# Patient Record
Sex: MALE | Race: WHITE | Employment: UNEMPLOYED | URBAN - METROPOLITAN AREA
[De-identification: names, ages, dates, MRNs, and addresses within clinical notes are randomized per-mention and may not be internally consistent; named-entity substitution may affect disease eponyms.]

---

## 2019-06-10 ENCOUNTER — HOSPITAL ENCOUNTER (EMERGENCY)
Facility: CLINIC | Age: 59
Discharge: HOME | End: 2019-06-10
Attending: STUDENT IN AN ORGANIZED HEALTH CARE EDUCATION/TRAINING PROGRAM | Admitting: STUDENT IN AN ORGANIZED HEALTH CARE EDUCATION/TRAINING PROGRAM
Payer: COMMERCIAL

## 2019-06-10 ENCOUNTER — APPOINTMENT (OUTPATIENT)
Dept: MRI IMAGING | Facility: CLINIC | Age: 59
End: 2019-06-10
Attending: STUDENT IN AN ORGANIZED HEALTH CARE EDUCATION/TRAINING PROGRAM
Payer: COMMERCIAL

## 2019-06-10 VITALS
RESPIRATION RATE: 16 BRPM | WEIGHT: 196 LBS | TEMPERATURE: 97.4 F | DIASTOLIC BLOOD PRESSURE: 76 MMHG | OXYGEN SATURATION: 96 % | BODY MASS INDEX: 26.58 KG/M2 | HEART RATE: 94 BPM | SYSTOLIC BLOOD PRESSURE: 139 MMHG

## 2019-06-10 DIAGNOSIS — R42 DIZZINESS: ICD-10-CM

## 2019-06-10 DIAGNOSIS — R52 BODY ACHES: ICD-10-CM

## 2019-06-10 DIAGNOSIS — R11.2 NON-INTRACTABLE VOMITING WITH NAUSEA, UNSPECIFIED VOMITING TYPE: ICD-10-CM

## 2019-06-10 LAB
ALBUMIN SERPL-MCNC: 3.7 G/DL (ref 3.4–5)
ALP SERPL-CCNC: 139 U/L (ref 40–150)
ALT SERPL W P-5'-P-CCNC: 45 U/L (ref 0–70)
ANION GAP SERPL CALCULATED.3IONS-SCNC: 5 MMOL/L (ref 3–14)
AST SERPL W P-5'-P-CCNC: 42 U/L (ref 0–45)
BASOPHILS # BLD AUTO: 0.1 10E9/L (ref 0–0.2)
BASOPHILS NFR BLD AUTO: 1.2 %
BILIRUB SERPL-MCNC: 0.3 MG/DL (ref 0.2–1.3)
BUN SERPL-MCNC: 15 MG/DL (ref 7–30)
CALCIUM SERPL-MCNC: 8.8 MG/DL (ref 8.5–10.1)
CHLORIDE SERPL-SCNC: 103 MMOL/L (ref 94–109)
CO2 SERPL-SCNC: 30 MMOL/L (ref 20–32)
CREAT SERPL-MCNC: 0.85 MG/DL (ref 0.66–1.25)
CRP SERPL-MCNC: 3 MG/L (ref 0–8)
DIFFERENTIAL METHOD BLD: ABNORMAL
EOSINOPHIL # BLD AUTO: 0.2 10E9/L (ref 0–0.7)
EOSINOPHIL NFR BLD AUTO: 2.3 %
ERYTHROCYTE [DISTWIDTH] IN BLOOD BY AUTOMATED COUNT: 16 % (ref 10–15)
GFR SERPL CREATININE-BSD FRML MDRD: >90 ML/MIN/{1.73_M2}
GLUCOSE SERPL-MCNC: 193 MG/DL (ref 70–99)
HCT VFR BLD AUTO: 47.5 % (ref 40–53)
HGB BLD-MCNC: 15.8 G/DL (ref 13.3–17.7)
IMM GRANULOCYTES # BLD: 0.1 10E9/L (ref 0–0.4)
IMM GRANULOCYTES NFR BLD: 0.9 %
LYMPHOCYTES # BLD AUTO: 1 10E9/L (ref 0.8–5.3)
LYMPHOCYTES NFR BLD AUTO: 11.8 %
MCH RBC QN AUTO: 32 PG (ref 26.5–33)
MCHC RBC AUTO-ENTMCNC: 33.3 G/DL (ref 31.5–36.5)
MCV RBC AUTO: 96 FL (ref 78–100)
MONOCYTES # BLD AUTO: 0.4 10E9/L (ref 0–1.3)
MONOCYTES NFR BLD AUTO: 4 %
NEUTROPHILS # BLD AUTO: 7.1 10E9/L (ref 1.6–8.3)
NEUTROPHILS NFR BLD AUTO: 79.8 %
NRBC # BLD AUTO: 0 10*3/UL
NRBC BLD AUTO-RTO: 0 /100
PLATELET # BLD AUTO: 221 10E9/L (ref 150–450)
POTASSIUM SERPL-SCNC: 4.2 MMOL/L (ref 3.4–5.3)
PROT SERPL-MCNC: 7.1 G/DL (ref 6.8–8.8)
RBC # BLD AUTO: 4.93 10E12/L (ref 4.4–5.9)
SODIUM SERPL-SCNC: 138 MMOL/L (ref 133–144)
TROPONIN I SERPL-MCNC: <0.015 UG/L (ref 0–0.04)
TSH SERPL DL<=0.005 MIU/L-ACNC: 2.14 MU/L (ref 0.4–4)
WBC # BLD AUTO: 8.9 10E9/L (ref 4–11)

## 2019-06-10 PROCEDURE — 84443 ASSAY THYROID STIM HORMONE: CPT | Performed by: STUDENT IN AN ORGANIZED HEALTH CARE EDUCATION/TRAINING PROGRAM

## 2019-06-10 PROCEDURE — 99284 EMERGENCY DEPT VISIT MOD MDM: CPT | Mod: 25 | Performed by: STUDENT IN AN ORGANIZED HEALTH CARE EDUCATION/TRAINING PROGRAM

## 2019-06-10 PROCEDURE — 93010 ELECTROCARDIOGRAM REPORT: CPT | Mod: Z6 | Performed by: STUDENT IN AN ORGANIZED HEALTH CARE EDUCATION/TRAINING PROGRAM

## 2019-06-10 PROCEDURE — A9585 GADOBUTROL INJECTION: HCPCS | Performed by: STUDENT IN AN ORGANIZED HEALTH CARE EDUCATION/TRAINING PROGRAM

## 2019-06-10 PROCEDURE — 84484 ASSAY OF TROPONIN QUANT: CPT | Performed by: STUDENT IN AN ORGANIZED HEALTH CARE EDUCATION/TRAINING PROGRAM

## 2019-06-10 PROCEDURE — 80053 COMPREHEN METABOLIC PANEL: CPT | Performed by: STUDENT IN AN ORGANIZED HEALTH CARE EDUCATION/TRAINING PROGRAM

## 2019-06-10 PROCEDURE — 70553 MRI BRAIN STEM W/O & W/DYE: CPT

## 2019-06-10 PROCEDURE — 25500064 ZZH RX 255 OP 636: Performed by: STUDENT IN AN ORGANIZED HEALTH CARE EDUCATION/TRAINING PROGRAM

## 2019-06-10 PROCEDURE — 86140 C-REACTIVE PROTEIN: CPT | Performed by: STUDENT IN AN ORGANIZED HEALTH CARE EDUCATION/TRAINING PROGRAM

## 2019-06-10 PROCEDURE — 99284 EMERGENCY DEPT VISIT MOD MDM: CPT | Mod: 25

## 2019-06-10 PROCEDURE — 93005 ELECTROCARDIOGRAM TRACING: CPT

## 2019-06-10 PROCEDURE — 85025 COMPLETE CBC W/AUTO DIFF WBC: CPT | Performed by: STUDENT IN AN ORGANIZED HEALTH CARE EDUCATION/TRAINING PROGRAM

## 2019-06-10 RX ORDER — GADOBUTROL 604.72 MG/ML
10 INJECTION INTRAVENOUS ONCE
Status: COMPLETED | OUTPATIENT
Start: 2019-06-10 | End: 2019-06-10

## 2019-06-10 RX ADMIN — GADOBUTROL 10 ML: 604.72 INJECTION INTRAVENOUS at 21:32

## 2019-06-10 NOTE — ED AVS SNAPSHOT
[facility] Emergency Department  [address] [address]  Phone:  [phone]  Fax:  [phone]                                    Saroj Summers   MRN: [id number]    Department:  [facility] Emergency Department   Date of Visit:  6/10/2019           After Visit Summary Signature Page    I have received my discharge instructions, and my questions have been answered. I have discussed any challenges I see with this plan with the nurse or doctor.    ..........................................................................................................................................  Patient/Patient Representative Signature      ..........................................................................................................................................  Patient Representative Print Name and Relationship to Patient    ..................................................               ................................................  Date                                   Time    ..........................................................................................................................................  Reviewed by Signature/Title    ...................................................              ..............................................  Date                                               Time          22EPIC Rev 08/18

## 2019-06-11 NOTE — ED NOTES
Pt states he feels better. Pt denies all c/o N/V and dizziness at this time. Pt with stable gait in the room.

## 2019-06-11 NOTE — ED NOTES
Describes wandering joint pain and headaches. He has been seen by a Dr Wing And was started on steroids. This has been going on for 5 weeks. He is anxious. He states he is dizzy at times. He has nausea. He is dizzy when he lies down.

## 2019-06-11 NOTE — ED PROVIDER NOTES
History     Chief Complaint   Patient presents with     Joint Pain     multiple complaints of pain and weakness     Headache     HPI  Saroj Summers is a 58 year old male with past medical history which includes anxiety, depression, tobacco use, and PTSD who presents for evaluation of multiple symptoms including headaches, dizziness, and body aches.  Patient explains that for the past 5 weeks his symptoms have been ongoing, spontaneous and without correlation to activities, but not always present.  At times he feels too dizzy to walk, other times he is without dizziness.  He has felt nauseous and suffered from frequent vomiting, but other times has a good appetite.  The headaches do not correlate with dizziness or body aches, generalized and achy but not sudden or thunderclap in onset seems as though his primary concern today was with regards to left-sided body aches beginning at the left shoulder, extending across his chest, followed by left lower extremity, and finally bilateral hips.  He has no active pain at this time.  He denies fever, chills, cough, shortness of breath, abdominal pain, diarrhea, or joint swelling.  No recent skin rashes, tick or insect bites.  He has been prescribed meclizine and Medrol Dosepak without relief.    Allergies:  Allergies   Allergen Reactions     Nka [No Known Allergies]        Problem List:    Patient Active Problem List    Diagnosis Date Noted     PTSD (post-traumatic stress disorder) 06/15/2015     Priority: Medium     Anxiety state 09/13/2012     Priority: Medium     Problem list name updated by automated process. Provider to review       Health Care Home 09/10/2012     Priority: Medium     Status: Pt is not currently in active care coordination.            Tobacco abuse 08/15/2012     Priority: Medium     Mild major depression (H) 08/15/2012     Priority: Medium     Alcoholism (H) 08/15/2012     Priority: Medium     [facility] - 2010 relapse 9/7/12  [facility]  1984 Herb is living at a care home house, has been in CD treatment,         Diverticulosis of sigmoid colon 2009     Priority: Medium     Overview:   per colonoscopy       Depressive disorder 11/15/2007     Priority: Medium     Overview:   Had a lot going on. Sons brain surgeries, wife's bipolar and alcoholism......       Encounter for general adult medical examination without abnormal findings 11/15/2007     Priority: Medium     Recheck lipid and A1C @  Overview:   LAST CPE 1999       Family history of malignant neoplasm of gastrointestinal tract 11/15/2007     Priority: Medium     Overview:   Father had colon cancer at 51yr and  52yr          Past Medical History:    No past medical history on file.    Past Surgical History:    No past surgical history on file.    Family History:    Family History   Problem Relation Age of Onset     Cancer - colorectal Father      Cancer Paternal Grandmother      C.A.D. Paternal Grandfather      Neurologic Disorder Son         brain trauma at birth     Alcohol/Drug Father        Social History:  Marital Status:   [4]  Social History     Tobacco Use     Smoking status: Current Every Day Smoker     Packs/day: 15.00     Years: 0.50     Pack years: 7.50     Smokeless tobacco: Current User   Substance Use Topics     Alcohol use: No     Comment: last drink 4 hrs ago     Drug use: No        Medications:      albuterol (PROVENTIL HFA: VENTOLIN HFA) 108 (90 BASE) MCG/ACT inhaler   albuterol (PROVENTIL HFA: VENTOLIN HFA) 108 (90 BASE) MCG/ACT inhaler   busPIRone (BUSPAR) 5 MG tablet   busPIRone (BUSPAR) 5 MG tablet   FLUoxetine (PROZAC) 20 MG capsule   FLUoxetine (PROZAC) 20 MG capsule   fluticasone (FLONASE) 50 MCG/ACT nasal spray   loratadine (CLARITIN) 10 MG tablet         Review of Systems  Constitutional:  Negative for fever or chills.  Eye:  Negative for double vision or changes from baseline.  Cardiovascular:  Negative for chest  pain.  Respiratory:  Negative for cough or shortness of breath.  Gastrointestinal: Positive for intermittent nausea with vomiting.  Negative for abdominal pain, diarrhea, hematochezia or hematemesis.  Musculoskeletal: Positive for intermittent body and joint pain.  Negative for joint swelling.  Neurological: Positive for intermittent dull achy generalized headaches.  Occasional dizziness described as room spinning sensation.  Negative for weakness or sensory deficits  Skin:  Negative for skin rashes or lesions.    All others reviewed and are negative.      Physical Exam   BP: 141/81  Pulse: 94  Heart Rate: 116  Temp: 97.4  F (36.3  C)  Resp: 18  Weight: 88.9 kg (196 lb)  SpO2: 91 %      Physical Exam  Constitutional:  Well developed, well nourished.  Appears nontoxic and in no acute distress.    HENT:  Normocephalic and atraumatic.  Symmetric in appearance.  Eyes:  Conjunctivae are normal.  Negative for visual field deficit.  EOMI denies diplopia.  PERRLA.  Negative for nystagmus.  Neck:  Neck supple.  Cardiovascular:  No cyanosis.  RRR.  No audible murmurs noted.  No lower extremity edema or asymmetry.   Respiratory:  Effort normal without sign of respiratory distress.  CTAB without diminished regions.    Gastrointestinal:  Soft nondistended abdomen.  Nontender and without guarding.  No rigidity or rebound tenderness.  Negative Zheng's sign.  Negative McBurney's point.  Musculoskeletal:  Moves extremities spontaneously.  Neurological:  Patient is alert, verbally communicating without dysarthria.  Ambulatory without ataxia or unsteady gait.  No facial droop.  CN II-XII grossly intact.    Skin:  Skin is warm and dry.  Psych:  Well-kept appearance.  Patient does not show signs of confusion or intoxication.  Able to carry a conversation with organized speech and thought process.  Mildly anxious but not agitated or threatening and without evidence of delusions.      ED Course        Procedures                 EKG  Interpretation:      Interpreted by: Coleman Hope  Time reviewed: Upon arrival     Symptoms at time of EKG: Asymptomatic   Rhythm: Sinus  Rate: Normal  Axis: Normal    Conduction: None atypical   ST Segments/ T Waves: No pathologic ST-elevations or T-wave abnormalities.  Q Waves: None  Comparison to prior: Similar morphology to previous     Clinical Impression: No sign of ischemia         Critical Care time:  none               Results for orders placed or performed during the hospital encounter of 06/10/19 (from the past 24 hour(s))   CBC with platelets differential   Result Value Ref Range    WBC 8.9 4.0 - 11.0 10e9/L    RBC Count 4.93 4.4 - 5.9 10e12/L    Hemoglobin 15.8 13.3 - 17.7 g/dL    Hematocrit 47.5 40.0 - 53.0 %    MCV 96 78 - 100 fl    MCH 32.0 26.5 - 33.0 pg    MCHC 33.3 31.5 - 36.5 g/dL    RDW 16.0 (H) 10.0 - 15.0 %    Platelet Count 221 150 - 450 10e9/L    Diff Method Automated Method     % Neutrophils 79.8 %    % Lymphocytes 11.8 %    % Monocytes 4.0 %    % Eosinophils 2.3 %    % Basophils 1.2 %    % Immature Granulocytes 0.9 %    Nucleated RBCs 0 0 /100    Absolute Neutrophil 7.1 1.6 - 8.3 10e9/L    Absolute Lymphocytes 1.0 0.8 - 5.3 10e9/L    Absolute Monocytes 0.4 0.0 - 1.3 10e9/L    Absolute Eosinophils 0.2 0.0 - 0.7 10e9/L    Absolute Basophils 0.1 0.0 - 0.2 10e9/L    Abs Immature Granulocytes 0.1 0 - 0.4 10e9/L    Absolute Nucleated RBC 0.0    Comprehensive metabolic panel   Result Value Ref Range    Sodium 138 133 - 144 mmol/L    Potassium 4.2 3.4 - 5.3 mmol/L    Chloride 103 94 - 109 mmol/L    Carbon Dioxide 30 20 - 32 mmol/L    Anion Gap 5 3 - 14 mmol/L    Glucose 193 (H) 70 - 99 mg/dL    Urea Nitrogen 15 7 - 30 mg/dL    Creatinine 0.85 0.66 - 1.25 mg/dL    GFR Estimate >90 >60 mL/min/[1.73_m2]    GFR Estimate If Black >90 >60 mL/min/[1.73_m2]    Calcium 8.8 8.5 - 10.1 mg/dL    Bilirubin Total 0.3 0.2 - 1.3 mg/dL    Albumin 3.7 3.4 - 5.0 g/dL    Protein Total 7.1 6.8 - 8.8 g/dL    Alkaline  Phosphatase 139 40 - 150 U/L    ALT 45 0 - 70 U/L    AST 42 0 - 45 U/L   Troponin I   Result Value Ref Range    Troponin I ES <0.015 0.000 - 0.045 ug/L   TSH with free T4 reflex   Result Value Ref Range    TSH 2.14 0.40 - 4.00 mU/L   CRP Inflammation   Result Value Ref Range    CRP Inflammation 3.0 0.0 - 8.0 mg/L   MR Brain w/o & w Contrast    Narrative    MRI BRAIN WITHOUT AND WITH CONTRAST  6/10/2019 9:33 PM    HISTORY:  Headaches, dizziness.     TECHNIQUE:  Multiplanar, multisequence MRI of the brain without and  with 10mL IV Gadavist.    COMPARISON: Head CT 9/7/2012    FINDINGS:  Mild volume loss is present. The cerebral hemispheres,  brainstem, and cerebellum otherwise demonstrate normal morphology and  signal. No evidence of acute ischemia, hemorrhage, mass, mass effect,  or hydrocephalus. No abnormal enhancement or diffusion restriction is  identified. The visualized calvarium, tympanic cavities, mastoid  cavities, and extracranial soft tissues are unremarkable. 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      Impression    IMPRESSION:  Unremarkable MRI of the head with and without contrast.    JOSHUA ZENG MD       Medications   sodium chloride (PF) 0.9% PF flush 10 mL (10 mLs Intravenous Given 6/10/19 2132)   gadobutrol (GADAVIST) injection 10 mL (10 mLs Intravenous Given 6/10/19 2132)       Assessments & Plan (with Medical Decision Making)   Saroj Summers is a 58 year old male who presents to the department for evaluation of 5 weeks of multiple but intermittent complaints.  Differential diagnosis included intracranial mass, multiple sclerosis, rheumatoid arthritis, Lyme's disease, lupus, or other autoimmune disorder.  He is afebrile and without infectious symptoms, unusually long duration and intermittent nature for infection.  No active symptoms in the department, benign abdominal examination and CBC without ptosis.  CRP is also within reference range.  MRI of  "head/brain was able to be obtained and read as \"unremarkable\" by radiologist without sign of lesions or previous infarct.  At this time it is difficult to discern the etiology of patient's symptoms but I feel he will require close follow-up and possibly specialist consultation.  Recommend he return to primary care provider, discussed emergency department testing and continue with management planning.      Disclaimer:  This note consists of symbols derived from keyboarding, dictation, and/or voice recognition software.  As a result, there may be errors in the script that have gone undetected.  Please consider this when interpreting information found in the chart.        I have reviewed the nursing notes.    I have reviewed the findings, diagnosis, plan and need for follow up with the patient.          Medication List      There are no discharge medications for this visit.         Final diagnoses:   Dizziness   Body aches   Non-intractable vomiting with nausea, unspecified vomiting type       6/10/2019   [facility] EMERGENCY DEPARTMENT     Coleman Hope DO  06/11/19 1732    "

## 2019-09-05 ENCOUNTER — HOSPITAL ENCOUNTER (OUTPATIENT)
Dept: BEHAVIORAL HEALTH | Facility: CLINIC | Age: 59
Discharge: HOME | End: 2019-09-05
Attending: SOCIAL WORKER | Admitting: SOCIAL WORKER
Payer: COMMERCIAL

## 2019-09-05 VITALS — WEIGHT: 204 LBS | BODY MASS INDEX: 26.18 KG/M2 | HEIGHT: 74 IN

## 2019-09-05 PROCEDURE — H0001 ALCOHOL AND/OR DRUG ASSESS: HCPCS

## 2019-09-05 ASSESSMENT — ANXIETY QUESTIONNAIRES
2. NOT BEING ABLE TO STOP OR CONTROL WORRYING: NOT AT ALL
3. WORRYING TOO MUCH ABOUT DIFFERENT THINGS: SEVERAL DAYS
7. FEELING AFRAID AS IF SOMETHING AWFUL MIGHT HAPPEN: NOT AT ALL
5. BEING SO RESTLESS THAT IT IS HARD TO SIT STILL: NOT AT ALL
IF YOU CHECKED OFF ANY PROBLEMS ON THIS QUESTIONNAIRE, HOW DIFFICULT HAVE THESE PROBLEMS MADE IT FOR YOU TO DO YOUR WORK, TAKE CARE OF THINGS AT HOME, OR GET ALONG WITH OTHER PEOPLE: NOT DIFFICULT AT ALL
1. FEELING NERVOUS, ANXIOUS, OR ON EDGE: NOT AT ALL
GAD7 TOTAL SCORE: 1
6. BECOMING EASILY ANNOYED OR IRRITABLE: NOT AT ALL

## 2019-09-05 ASSESSMENT — PATIENT HEALTH QUESTIONNAIRE - PHQ9
SUM OF ALL RESPONSES TO PHQ QUESTIONS 1-9: 1
5. POOR APPETITE OR OVEREATING: NOT AT ALL

## 2019-09-05 ASSESSMENT — MIFFLIN-ST. JEOR: SCORE: 1805.09

## 2019-09-05 ASSESSMENT — PAIN SCALES - GENERAL: PAINLEVEL: NO PAIN (0)
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
## 2019-09-05 NOTE — PROGRESS NOTES
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   Partner encourages use No   Most friends drink or use No   To cope with family problems No   Afraid of withdrawal symptoms/to feel better No   Other (specify)  No     A. What concerns other people about your alcohol or drug use/Has anyone told you that you use too much? What did they say? (DSM)     Pt denied any concern expressed in the last year.     B. What did you think about that/ do you think you have a problem with alcohol or drug use?     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 Pt reported: in the past when I would drink it was not for one day or one drink, I mean I would drink and it was hard liquor. You would be able to smell it if I had drank.    6. What changes are you willing to make? What substance are you willing to stop using? How are you going to do that? Have you tried that before? What interfered with your success with that goal?      Per patient:Been sober since 11/2012-go to meetings once per week, I have several sponsors, 1-3x per week depends. Just my continual going to Hindu and  "spiritual conditions and keeping higher power. \"I rarely ever miss AA meetings and I have a sponsor\".     7. What would be helpful to you in making this change?     Pt reported continue with my sober supports.     Dimension IV Ratings   Readiness for Change - The placing authority must use the criteria in Dimension IV to determine a client s readiness for change.   RISK DESCRIPTIONS - Severity ratin Client is cooperative, motivated, ready to change, admits problems, committed to change, and engaged in treatment as a responsible participant.    REASONS SEVERITY WAS ASSIGNED - (What information did the person provide that supports your assessment of his or her readiness to change? How aware is the person of problems caused by continued use? How willing is she or he to make changes? What does the person feel would be helpful? What has the person been able to do without help?)      Pt reported he has been sober since 2012. Pt reported he plans to continue with his sobriety by continuing with his sober support that he has established.          DIMENSION V - Relapse, Continued Use, and Continued Problem Potential   1A. In what ways have you tried to control, cut-down or quit your use? If you have had periods of sobriety, how did you accomplish that? What was helpful? What happened to prevent you from continuing your sobriety? (DSM)     Pt reported he has been sober since 2012. Pt reported go to meetings once per week, I have several sponsors, 1-3x per week depends. Just my continual going to Confucianism and spiritual conditions and keeping higher power. \"I rarely ever miss AA meetings and I have a sponsor\".     1B. What were the circumstances of your most recent relapse with mood altering chemicals?    Pt reported being sober since 2012.    2. Have you experienced cravings? If yes, ask follow up questions to determine if the person recognizes triggers and if the person has had any success in dealing with them. "     The patient denied having any current cravings to use mood altering chemicals.    3. Have you been treated for alcohol/other drug abuse/dependence? Yes.  3B. Number of times(lifetime) (over what period) 3.  3C. Number of times completed treatment (lifetime) 3.  3D. During the past three years have you participated in outpatient and/or residential?  No    4. Support group participation: Have you/do you attend support group meetings to reduce/stop your alcohol/drug use? How recently? What was your experience? Are you willing to restart? If the person has not participated, is he or she willing?     Per patient:Once per week, last Thursday last meeting and speak with sponsor 1-3x per week just depends.     Per EHR office note on 6/15/15:Patient goes to therapy regularly and AA meetings 3/week.    5. What would assist you in staying sober/straight?     Per patient:Continue with sober supports.     Dimension V Ratings   Relapse/Continued Use/Continued problem potential - The placing authority must use the criteria in Dimension V to determine a client s relapse, continued use, and continued problem potential.   RISK DESCRIPTIONS - Severity ratin Client recognizes relapse issues and prevention strategies, but displays some vulnerability for further substance use or mental health problems.    REASONS SEVERITY WAS ASSIGNED - (What information did the person provide that indicates his or her understanding of relapse issues? What about the person s experience indicates how prone he or she is to relapse? What coping skills does the person have that decrease relapse potential?)      Pt reported he has been sober since 2012. Pt reported he has been staying sober by attending sober support group meetings, sponsorship, and attending Yarsanism. Pt denied cravings. Pt reported past treatment attendance three times and completed all three programs. Pt reported he plans to continue with his sober supports.          DIMENSION VI  - Recovery Environment   1. Are you employed/attending school? Tell me about that.     Pt reported: Not working or attending school, cannot do anything without a car, previous work business owner for 24 years. 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     2A. Describe a typical day; evening for you. Work, school, social, leisure, volunteer, spiritual practices. Include time spent obtaining, using, recovering from drugs or alcohol. (DSM)     Per patient: typically before losing license was working, get up every morning and get son ready for school, and get him off to school, work, get home, and make dinner for him, and appointments or activities for him through school.     Please describe what leisure activities have been associated with your substance abuse:     The patient denied having any leisure activities which had been associated with his substance abuse.    2B. How often do you spend more time than you planned using or use more than you planned? (DSM)     Pt reported he has been sober since 11/2012.     3. How important is using to your social connections? Do many of your family or friends use?     Per patient:Really do not have friends that use, and family members, I think one uses but he lives in [place], not abusive with it, rarely drinks. Not exposed to family members that use alcohol.     4A. Are you currently in a significant relationship?     No    4C. Sexual Orientation:     Heterosexual    5A. Who do you live with?      Pt reported living with son. Per EHR office note on 8/29/17:He is the sole caregiver for his son who has some disabilities and would like to be around for as long as possible.      5B. Tell me about their alcohol/drug use and mental health issues.     Pt denied any use by his son.     5C. Are you concerned for your safety there? No    5D. Are you concerned about the safety of anyone else who lives with you? No    6A. Do you have children who live with you?     No, son 17  "year old with disabilities.     6B. Do you have children who do not live with you?     Yes.  (Ask follow-up questions to determine the person's relationship and responsibility, both legal and care giving; and what arrangements are made for supervision for the children when the person is not available.) 29 year old-pt reported he communicates with her often.     7A. Who supports you in making changes in your alcohol or drug use? What are they willing to do to support you? Who is upset or angry about you making changes in your alcohol or drug use? How big a problem is this for you?      \"AA groups and sponsor\"    7B. This table is provided to record information about the person s relationships and available support It is not necessary to ask each item; only to get a comprehensive picture of their support system.  How often can you count on the following people when you need someone?   Partner / Spouse The patient does not have a current partner or spouse.   Parent(s)/Aunt(s)/Uncle(s)/Grandparents Always supportive   Sibling(s)/Cousin(s) 1 sister talk to her once every 2-3 months   Child(nisa) Always supportive   Other relative(s) The patient doesn't have any current contact with other relatives.   Friend(s)/neighbor(s) Always supportive   Child(nisa) s father(s)/mother(s) The patient doesn't have any current contact with children(s) mother or father.   Support group member(s) Always supportive   Community of [facility]ri[facility] members Always supportive   /counselor/therapist/healer The patient denied having any current involvement with a , counselor, therapist or healer.   Other (specify) No     8A. What is your current living situation?     Pt reported living with son.     8B. What is your long term plan for where you will be living?     Per patient:currently comfortable where I am.Son has a year left in school, would like to get him enrolled in [employer] program they have for special needs. He is high " functioning.     8C. Tell me about your living environment/neighborhood? Ask enough follow up questions to determine safety, criminal activity, availability of alcohol and drugs, supportive or antagonistic to the person making changes.      Per patient:Safe.    9. Criminal justice history: Gather current/recent history and any significant history related to substance use--Arrests? Convictions? Circumstances? Alcohol or drug involvement? Sentences? Still on probation or parole? Expectations of the court? Current court order? Any sex offenses - lifetime? What level? (DSM)    Per patient:no current legal issues, DWIs three, 82', 85', and 2010.     10. What obstacles exist to participating in treatment? (Time off work, childcare, funding, transportation, pending long-term time, living situation)     None reported.     Dimension VI Ratings   Recovery environment - The placing authority must use the criteria in Dimension VI to determine a client s recovery environment.   RISK DESCRIPTIONS - Severity ratin Client is engaged in structured, meaningful activity and has a supportive significant other, family, and living environment.    REASONS SEVERITY WAS ASSIGNED - (What support does the person have for making changes? What structure/stability does the person have in his or her daily life that will increase the likelihood that changes can be sustained? What problems exist in the person s environment that will jeopardize getting/staying clean and sober?)     Pt reported he is not working or attending school currently but takes care of his son who has special needs. Pt reported using is not important to his social connections as he is not around people who use. Pt reported he has communication with his adult daughter who lives independently. Pt reported his support is his AA groups and sponsor. Pt reported past legal issues but denied current legal issues. Pt reported issues with DMV.          Client Choice/Exceptions   Would  you like services specific to language, age, gender, culture, Faith preference, race, ethnicity, sexual orientation or disability?  No    What particular treatment choices and options would you like to have? None    Do you have a preference for a particular treatment program? None    Criteria for Diagnosis     Criteria for Diagnosis  DSM-5 Criteria for Substance Use Disorder  Instructions: Determine whether the client currently meets the criteria for Substance Use Disorder using the diagnostic criteria in the DSM-V pp.481-589. Current means during the most recent 12 months outside a facility that controls access to substances    Category of Substance Severity (ICD-10 Code / DSM 5 Code)     Alcohol Use Disorder The patient does not meet the criteria for an Alcohol use disorder.   Cannabis Use Disorder The patient does not meet the criteria for a Cannabis use disorder.   Hallucinogen Use Disorder The patient does not meet the criteria for a Hallucinogen use disorder.   Inhalant Use Disorder The patient does not meet the criteria for an Inhalant use disorder.   Opioid Use Disorder The patient does not meet the criteria for an Opioid use disorder.   Sedative, Hypnotic, or Anxiolytic Use Disorder The patient does not meet the criteria for a Sedative/Hypnotic use disorder.   Stimulant Related Disorder The patient does not meet the criteria for a Stimulant use disorder.   Tobacco Use Disorder The patient does not meet the criteria for a Tobacco use disorder.   Other (or unknown) Substance Use Disorder The patient does not meet the criteria for a Other (or unknown) Substance use disorder.       Collateral Contact Summary   Number of contacts made: 3    Contact with referring person:  No    If court related records were reviewed, summarize here: No court records had been reviewed at the time of this documentation.    Information from collateral contacts supported/largely agreed with information from the client and  associated risk ratings.      Rule 25 Assessment Summary and Plan   's Recommendation    1. Abstain from using all non-prescribed mood altering chemicals and substances. Take all medication as prescribed and directed by licensed physicians.  2.Continue to follow all recommendations and referrals made by other licensed medical providers such as pcp and specialist medical doctors.  3.Comply with all requirements of the Department of Public Safety/DMV.  4.Continue to attend sober support group meetings and communication with sponsor weekly.      Collateral Contacts     Name:    Electronic Health Records (EHR)   Relationship:    Medical Records   Phone Number:    None Releases:    Yes     Pt medical record was reviewed and utilized for collateral purposes of this assessment and largely agreed with the information from the patient.      Collateral Contacts     Name:    Debra Summers    Relationship:    Mother   Phone Number:    [phone]   Releases:    Yes     Called collateral contact on 9/5/19 who reported:Right I was here when they brought him home, the officer brought him here, and he said he did not smell any alcohol, I said he (patient) was under doctor care and they are not sure if it is Lyme Disease or Diabetes. He had been seeing the doctor and even that day saw the doctor and had trouble walking and could not even walk around the house without catching himself or stopping self and grabbing something. I told the officer that and the officer said okay. Right he has been sober. Collateral information from collateral contact largely agreed with the information from the patient.    Collateral Contacts     Name:    Debra Khalil    Relationship:    Sister   Phone Number:    [phone]    Releases:    Yes     Left VM for collateral contact and information on 9/5/19. The collateral contact called back and reported: No concern. Sounds accurate, was not there when police were there, but heard about it,  the way you described it the same story I was told. We were surprised that he was getting his license taken away as he did not do anything. Collateral information from collateral contact largely agreed with the information from the patient.   ollateral Contacts      A problematic pattern of alcohol/drug use leading to clinically significant impairment or distress, as manifested by at least two of the following, occurring within a 12-month period:    None      Specify if: In early remission:  After full criteria for alcohol/drug use disorder were previously met, none of the criteria for alcohol/drug use disorder have been met for at least 3 months but for less than 12 months (with the exception that Criterion A4,  Craving or a strong desire or urge to use alcohol/drug  may be met).     In sustained remission:   After full criteria for alcohol use disorder were previously met, non of the criteria for alcohol/drug use disorder have been met at any time during a period of 12 months or longer (with the exception that Criterion A4,  Craving or strong desire or urge to use alcohol/drug  may be met).   Specify if:   This additional specifier is used if the individual is in an environment where access to alcohol is restricted.    Mild: Presence of 2-3 symptoms  Moderate: Presence of 4-5 symptoms  Severe: Presence of 6 or more symptoms

## 2019-09-05 NOTE — PROGRESS NOTES
[facility]  [address]  [place] [address]        ADULT CD ASSESSMENT ADDENDUM      Patient Name: Saroj Summers  Cell Phone:   Home: [phone] (home)    Mobile:   Telephone Information:   Mobile [phone]       Email:  [email or web address]  Emergency Contact: Debra Summers   Tel: [phone]    The patient reported being:      With which race do you identify? White    Initial Screening Questions     1. Are you currently having severe withdrawal symptoms that are putting yourself or others in danger?  No    2. Are you currently having severe medical problems that require immediate attention?  No    3. Are you currently having severe emotional or behavioral problems that are putting yourself or others at risk of harm?  No    4. Do you have sufficient reading skills that will enable you to understand written materials, including the program rules and client rights materials?  Yes     Family History and other additional information     Who raised you? (parents, grandparents, adoptive parents, step-parents, etc.)    Both Parents    Please tell me what it was like growing up in your family. (please include any history of substance abuse, mental health issues, emotional/physical/sexual abuse, forms of discipline, and support)     Per patient:raised by parents, mother is living, father is , two sisters are living, four brothers living, father alcoholism. Chaotic, mother and father were together but  after when kids were all adults. My father  shortly after. Felt most supported by both parents. No abuse within the family. No MH conditions in family. Grew up in [place], [place].     Do you have any children or Stepchildren? Yes, explain: 17 year old son, 29 year old daughter    Are you being investigated by Child Protection Services? No    Do you have a child protection worker, probation office or ?  No    How would you  "describe your current finances?  Just making it    If you are having problems, (unpaid bills, bankruptcy, IRS problems) please explain:  No    If working or a student are you able to function appropriately in that setting? Yes     Describe your preferred learning style:  by reading    What are your some of your personal strengths?  \"I rarely ever miss AA Meetings and have a sponsor\"    Do you currently participate in community arianna activities, such as attending Islam, temple, Confucianism or Taoism services?  Yes, explain: [employer] weekly.     How does your spirituality impact your recovery?  My higher power Hinduism God, attendance to [employer] every week strengthens my sobriety. Gives me a feeling of good purpose. Stay involved with father of Lutheran, son was in young Hinduism group.     Do you currently self-administer your medications?  The patient is not currently taking any prescription or over the counter medications.    Have you ever had to lie to people important to you about how much you fermin?   No   Have you ever felt the need to bet more and more money?   No   Have you ever attempted treatment for a gambling problem?   No   Have you ever touched or fondled someone else inappropriately or forced them to have sex with you against their will?   No   Are you or have you ever been a registered sex offender?   No   Is there any history of sexual abuse in your family? No   Have you ever felt obsessed by your sexual behavior, such as having sex with many partners, masturbating often, using pornography often?   No     Have you ever received therapy or stayed in the hospital for mental health problems?   Yes, explain: Pt reported past therapy.      Have you ever hurt yourself, such as cutting, burning or hitting yourself?   No     Have you ever purged, binged or restricted yourself as a way to control your weight?   No     Are you on a special diet?   No     Do you have any concerns regarding your nutritional " status?   No     Have you had any appetite changes in the last 3 months?   No   Have you had weight loss or weight gain of more than 10 lbs in the last 3 months?   If patient gained or lost more than 10 lbs, then refer to program RN / attending Physician for assessment.   No   Was the patient informed of BMI?    Above,  General nutrition education   No   Have you engaged in any risk-taking behavior that would put you at risk for exposure to blood-borne or sexually transmitted diseases?   No   Do you have any dental problems?   No   Have you ever lived through any trauma or stressful life events?   No   In the past month, have you had any of the following symptoms related to the trauma listed above? (dreams, intense memories, flashbacks, physical reactions, etc.)   No   Have you ever believed people were spying on you, or that someone was plotting against you or trying to hurt you?   No   Have you ever believed someone was reading your mind or could hear your thoughts or that you could actually read someone's mind or hear what another person was thinking?   No   Have you ever believed that someone of some force outside of yourself was putting thoughts into your mind or made you act in a way that was not your usual self?  Have you ever though you were possessed?   No   Have you ever believed you were being sent special messages through the TV, radio or newspaper?   No   Have you ever heard things other people couldn't hear, such as voices or other noises?   No   Have you ever had visions when you were awake?  Or have you ever seen things other people couldn't see?   No   Do you have a valid 's license?    No, explain: revoked currently     PHQ-9, LUCY-7 and Suicide Risk Assessment   PHQ-9 on 9/5/2019 LUCY-7 on 9/5/2019   The patient's PHQ-9 score was 1 out of 27, indicating minimal depression.   The patient's LUCY-7 score was 1 out of 21, indicating minimal anxiety.       [place]-Suicide Severity Rating Scale    Suicide Ideation   1.) Have you ever wished you were dead or that you could go to sleep and not wake up?     Lifetime:  No   Past Month:  No     2.) Have you actually had any thoughts of killing yourself?   Lifetime:  No   Past Month:  No     3.) Have you been thinking about how you might do this?     Lifetime:  No   Past Month:  No     4.) Have you had these thoughts and had some intention of acting on them?     Lifetime:  No   Past Month:  No     5.) Have you started to work out the details of how to kill yourself?   Lifetime:  No   Past Month:  No     6.) Do you intend to carry out this plan?      Lifetime:  No   Past Month:  No     Intensity of Ideation   Intensity of ideation (1 being least severe, 5 being most severe):     Lifetime:  The patient denied ever having any suicidal thoughts in life.   Past Month:  The patient denied ever having any suicidal thoughts in life.     How often do you have these thoughts?  The patient denied ever having any suicidal thoughts in life.     When you have the thoughts how long do they last?  The patient denied ever having any suicidal thoughts in life.     Can you stop thinking about killing yourself or wanting to die if you want to?  The patient denied ever having any suicidal thoughts in life.     Are there things - anyone or anything (i.e. family, Judaism, pain of death) that stopped you from wanting to die or acting on thoughts of suicide?  Does not apply     What sort of reasons did you have for thinking about wanting to die or killing yourself (ie end pain, stop how you were feeling, get attention or reaction, revenge)?  Does not apply     Suicidal Behavior   (Suicide Attempt) - Have you made a suicide attempt?     Lifetime:  The patient had never made a suicide attempt.   Past Month:  The patient had never made a suicide attempt.     Have you engaged in self-harm (non-suicidal self-injury)?  The patient denied having any history of engaging in self-harm  (non-suicidal self-injury).     (Interrupted Attempt) - Has there been a time when you started to do something to end your life but someone or something stopped you before you actually did anything?  No     (Aborted or Self-Interrupted Attempt) - Has there been a time when you started to do something to try to end your life but you stopped yourself before you actually did anything?  No     (Preparatory Acts of Behavior) - Have you taken any steps towards making suicide attempt or preparing to kill yourself (such as collecting pills, getting a gun, giving valuables away or writing a suicide note)?  No     Actual Lethality/Medical Damage:  The patient denied ever making a suicidal attempt.       2008  The Research [facility] [employer]  Used with permission by Brittni Crystal, PhD.       Guide to C-SSRS Risk Ratings   NO IDEATION:  with no active thoughts IDEATION: with a wish to die. IDEATION: with active thoughts. Risk Ratings   If Yes No No 0 - Very Low Risk   If NA Yes No 1 - Low Risk   If NA Yes Yes 2 - Low/moderate risk   IDEATION: associated thoughts of methods without intent or plan INTENT: Intent to follow through on suicide PLAN: Plan to follow through on suicide Risk Ratings cont...   If Yes No No 3 - Moderate Risk   If Yes Yes No 4 - High Risk   If Yes Yes Yes 5 - High Risk   The patient's ADDITIONAL RISK FACTORS and lack of PROTECTIVE FACTORS may increase their overall suicide risk ratings.     Additional Risk Factors:    Significant history of physical illness or chronic medical problems   Protective Factors:    Having people in his/her life that would prevent the patient from considering a suicide attempt (i.e. young children, spouse, parents, etc.)     A positive relationship with his/her clinical medical and/or mental health providers     Having a good community support network     Risk Status   Past month:0. - Very Low Risk:  Evaluation Counselors:  Document in Epic / SBAR to counselor  "\"Very Low Risk\".      Treatment Counselors:  Reassess upon admission as applicable, assess weekly in progress notes under Dimension 3 and summarize in Discharge / Treatment summary under Dimension 3.    Past 24 hours:0. - Very Low Risk:  Evaluation Counselors:  Document in Epic / SBAR to counselor \"Very Low Risk\".      Treatment Counselors:  Reassess upon admission as applicable, assess weekly in progress notes under Dimension 3 and summarize in Discharge / Treatment summary under Dimension 3.   Additional information to support suicide risk rating: There was no additional information to provide at this time.     Mental Health Status   Physical Appearance/Attire: Appears stated age and Neat   Hygiene: well groomed   Eye Contact: at examiner   Speech Rate:  regular   Speech Volume: regular   Speech Quality: fluid   Cognitive/Perceptual:  reality based   Cognition: memory intact    Judgment: able to concentrate   Insight: able to concentrate   Orientation:  time, place, person and situation   Thought: concrete   Hallucinations:  none   General Behavioral Tone: cooperative   Psychomotor Activity: no problem noted   Gait:  no problem   Mood: appropriate   Affect: congruence/appropriate   Counselor Notes: NA     Criteria for Diagnosis: DSM-5 Criteria for Substance Use Disorders      The patient does not currently identify with a DSM-5 substance use disorder.    Level of Care   I.) Intoxication and Withdrawal: 0   II.) Biomedical:  1   III.) Emotional and Behavioral:  1   IV.) Readiness to Change:  0   V.) Relapse Potential: 1   VI.) Recovery Environmental: 1     Initial Problem List     Pt has current DMV issues.     Patient/Client is willing to follow treatment recommendations.  Yes    Counselor: Sola Moses [facility]      Vulnerable Adult Checklist for OUTPATIENTS     1.  Do you have a physical, emotional or mental infirmity or dysfunction?       No    2.  Does this issue impair your ability to provide for your own " care without help, including providing yourself with food, shelter, clothing, healthcare or supervision?       No    3.  Because of this issue, I need assistance to protect myself from maltreatment by others.      No    Based on the above information:    This person is not a functional Vulnerable Adult according to [place] Statute 626.5572 subdivision 21.

## 2019-09-06 ASSESSMENT — ANXIETY QUESTIONNAIRES: GAD7 TOTAL SCORE: 1

## 2020-07-13 ENCOUNTER — APPOINTMENT (OUTPATIENT)
Dept: CT IMAGING | Facility: CLINIC | Age: 60
End: 2020-07-13
Attending: EMERGENCY MEDICINE
Payer: COMMERCIAL

## 2020-07-13 ENCOUNTER — HOSPITAL ENCOUNTER (INPATIENT)
Facility: CLINIC | Age: 60
LOS: 1 days | Discharge: HOME | End: 2020-07-14
Attending: EMERGENCY MEDICINE | Admitting: SURGERY
Payer: COMMERCIAL

## 2020-07-13 ENCOUNTER — ANESTHESIA (OUTPATIENT)
Dept: SURGERY | Facility: CLINIC | Age: 60
End: 2020-07-13
Payer: COMMERCIAL

## 2020-07-13 ENCOUNTER — ANESTHESIA EVENT (OUTPATIENT)
Dept: SURGERY | Facility: CLINIC | Age: 60
End: 2020-07-13
Payer: COMMERCIAL

## 2020-07-13 DIAGNOSIS — E27.8 ADRENAL MASS, RIGHT (H): ICD-10-CM

## 2020-07-13 DIAGNOSIS — K35.33 APPENDICITIS WITH ABSCESS: Primary | ICD-10-CM

## 2020-07-13 DIAGNOSIS — K35.32 ACUTE APPENDICITIS WITH PERFORATION AND LOCALIZED PERITONITIS, WITHOUT ABSCESS OR GANGRENE: ICD-10-CM

## 2020-07-13 LAB
ALBUMIN SERPL-MCNC: 3.5 G/DL (ref 3.4–5)
ALP SERPL-CCNC: 95 U/L (ref 40–150)
ALT SERPL W P-5'-P-CCNC: 29 U/L (ref 0–70)
ANION GAP SERPL CALCULATED.3IONS-SCNC: 7 MMOL/L (ref 3–14)
AST SERPL W P-5'-P-CCNC: 21 U/L (ref 0–45)
BASOPHILS # BLD AUTO: 0.1 10E9/L (ref 0–0.2)
BASOPHILS NFR BLD AUTO: 0.6 %
BILIRUB SERPL-MCNC: 0.5 MG/DL (ref 0.2–1.3)
BUN SERPL-MCNC: 14 MG/DL (ref 7–30)
CALCIUM SERPL-MCNC: 9 MG/DL (ref 8.5–10.1)
CHLORIDE SERPL-SCNC: 106 MMOL/L (ref 94–109)
CO2 SERPL-SCNC: 25 MMOL/L (ref 20–32)
CREAT SERPL-MCNC: 0.86 MG/DL (ref 0.66–1.25)
DIFFERENTIAL METHOD BLD: ABNORMAL
EOSINOPHIL # BLD AUTO: 0.3 10E9/L (ref 0–0.7)
EOSINOPHIL NFR BLD AUTO: 2.3 %
ERYTHROCYTE [DISTWIDTH] IN BLOOD BY AUTOMATED COUNT: 13.6 % (ref 10–15)
GFR SERPL CREATININE-BSD FRML MDRD: >90 ML/MIN/{1.73_M2}
GLUCOSE SERPL-MCNC: 89 MG/DL (ref 70–99)
HCT VFR BLD AUTO: 44.4 % (ref 40–53)
HGB BLD-MCNC: 15 G/DL (ref 13.3–17.7)
IMM GRANULOCYTES # BLD: 0.2 10E9/L (ref 0–0.4)
IMM GRANULOCYTES NFR BLD: 1.9 %
LYMPHOCYTES # BLD AUTO: 1.7 10E9/L (ref 0.8–5.3)
LYMPHOCYTES NFR BLD AUTO: 14.2 %
MCH RBC QN AUTO: 31 PG (ref 26.5–33)
MCHC RBC AUTO-ENTMCNC: 33.8 G/DL (ref 31.5–36.5)
MCV RBC AUTO: 92 FL (ref 78–100)
MONOCYTES # BLD AUTO: 1 10E9/L (ref 0–1.3)
MONOCYTES NFR BLD AUTO: 8.6 %
NEUTROPHILS # BLD AUTO: 8.5 10E9/L (ref 1.6–8.3)
NEUTROPHILS NFR BLD AUTO: 72.4 %
NRBC # BLD AUTO: 0 10*3/UL
NRBC BLD AUTO-RTO: 0 /100
PLATELET # BLD AUTO: 263 10E9/L (ref 150–450)
POTASSIUM SERPL-SCNC: 3.8 MMOL/L (ref 3.4–5.3)
PROT SERPL-MCNC: 7.9 G/DL (ref 6.8–8.8)
RBC # BLD AUTO: 4.84 10E12/L (ref 4.4–5.9)
SODIUM SERPL-SCNC: 138 MMOL/L (ref 133–144)
WBC # BLD AUTO: 11.8 10E9/L (ref 4–11)

## 2020-07-13 PROCEDURE — 37000008 ZZH ANESTHESIA TECHNICAL FEE, 1ST 30 MIN: Performed by: SURGERY

## 2020-07-13 PROCEDURE — 80053 COMPREHEN METABOLIC PANEL: CPT | Performed by: EMERGENCY MEDICINE

## 2020-07-13 PROCEDURE — 0DTJ4ZZ RESECTION OF APPENDIX, PERCUTANEOUS ENDOSCOPIC APPROACH: ICD-10-PCS | Performed by: SURGERY

## 2020-07-13 PROCEDURE — 25800025 ZZH RX 258: Performed by: SURGERY

## 2020-07-13 PROCEDURE — 25800030 ZZH RX IP 258 OP 636: Performed by: EMERGENCY MEDICINE

## 2020-07-13 PROCEDURE — 88304 TISSUE EXAM BY PATHOLOGIST: CPT | Performed by: SURGERY

## 2020-07-13 PROCEDURE — 99221 1ST HOSP IP/OBS SF/LOW 40: CPT | Mod: 57 | Performed by: SURGERY

## 2020-07-13 PROCEDURE — 25000128 H RX IP 250 OP 636: Performed by: EMERGENCY MEDICINE

## 2020-07-13 PROCEDURE — 25000128 H RX IP 250 OP 636: Performed by: NURSE ANESTHETIST, CERTIFIED REGISTERED

## 2020-07-13 PROCEDURE — 37000009 ZZH ANESTHESIA TECHNICAL FEE, EACH ADDTL 15 MIN: Performed by: SURGERY

## 2020-07-13 PROCEDURE — 25000125 ZZHC RX 250: Performed by: NURSE ANESTHETIST, CERTIFIED REGISTERED

## 2020-07-13 PROCEDURE — 44970 LAPAROSCOPY APPENDECTOMY: CPT | Performed by: SURGERY

## 2020-07-13 PROCEDURE — 85025 COMPLETE CBC W/AUTO DIFF WBC: CPT | Performed by: EMERGENCY MEDICINE

## 2020-07-13 PROCEDURE — 25000566 ZZH SEVOFLURANE, EA 15 MIN: Performed by: SURGERY

## 2020-07-13 PROCEDURE — 12000000 ZZH R&B MED SURG/OB

## 2020-07-13 PROCEDURE — 36000056 ZZH SURGERY LEVEL 3 1ST 30 MIN: Performed by: SURGERY

## 2020-07-13 PROCEDURE — 71000014 ZZH RECOVERY PHASE 1 LEVEL 2 FIRST HR: Performed by: SURGERY

## 2020-07-13 PROCEDURE — 0DNW4ZZ RELEASE PERITONEUM, PERCUTANEOUS ENDOSCOPIC APPROACH: ICD-10-PCS | Performed by: SURGERY

## 2020-07-13 PROCEDURE — 25000125 ZZHC RX 250: Performed by: SURGERY

## 2020-07-13 PROCEDURE — 74177 CT ABD & PELVIS W/CONTRAST: CPT

## 2020-07-13 PROCEDURE — 25800030 ZZH RX IP 258 OP 636: Performed by: NURSE ANESTHETIST, CERTIFIED REGISTERED

## 2020-07-13 PROCEDURE — 88304 TISSUE EXAM BY PATHOLOGIST: CPT | Mod: 26 | Performed by: SURGERY

## 2020-07-13 PROCEDURE — 25000125 ZZHC RX 250: Performed by: EMERGENCY MEDICINE

## 2020-07-13 PROCEDURE — 36000058 ZZH SURGERY LEVEL 3 EA 15 ADDTL MIN: Performed by: SURGERY

## 2020-07-13 PROCEDURE — 27110028 ZZH OR GENERAL SUPPLY NON-STERILE: Performed by: SURGERY

## 2020-07-13 PROCEDURE — 27210794 ZZH OR GENERAL SUPPLY STERILE: Performed by: SURGERY

## 2020-07-13 RX ORDER — IOPAMIDOL 755 MG/ML
100 INJECTION, SOLUTION INTRAVASCULAR ONCE
Status: COMPLETED | OUTPATIENT
Start: 2020-07-13 | End: 2020-07-13

## 2020-07-13 RX ORDER — PHENYLEPHRINE HYDROCHLORIDE 10 MG/ML
INJECTION INTRAVENOUS PRN
Status: DISCONTINUED | OUTPATIENT
Start: 2020-07-13 | End: 2020-07-14

## 2020-07-13 RX ORDER — ONDANSETRON 2 MG/ML
INJECTION INTRAMUSCULAR; INTRAVENOUS PRN
Status: DISCONTINUED | OUTPATIENT
Start: 2020-07-13 | End: 2020-07-14

## 2020-07-13 RX ORDER — BUPIVACAINE HYDROCHLORIDE 5 MG/ML
INJECTION, SOLUTION PERINEURAL PRN
Status: DISCONTINUED | OUTPATIENT
Start: 2020-07-13 | End: 2020-07-14 | Stop reason: HOSPADM

## 2020-07-13 RX ORDER — EPHEDRINE SULFATE 50 MG/ML
INJECTION, SOLUTION INTRAVENOUS PRN
Status: DISCONTINUED | OUTPATIENT
Start: 2020-07-13 | End: 2020-07-14

## 2020-07-13 RX ORDER — SODIUM CHLORIDE, SODIUM LACTATE, POTASSIUM CHLORIDE, CALCIUM CHLORIDE 600; 310; 30; 20 MG/100ML; MG/100ML; MG/100ML; MG/100ML
INJECTION, SOLUTION INTRAVENOUS CONTINUOUS PRN
Status: DISCONTINUED | OUTPATIENT
Start: 2020-07-13 | End: 2020-07-14

## 2020-07-13 RX ORDER — LIDOCAINE HYDROCHLORIDE 10 MG/ML
INJECTION, SOLUTION INFILTRATION; PERINEURAL PRN
Status: DISCONTINUED | OUTPATIENT
Start: 2020-07-13 | End: 2020-07-14

## 2020-07-13 RX ORDER — LIDOCAINE HYDROCHLORIDE AND EPINEPHRINE 10; 10 MG/ML; UG/ML
INJECTION, SOLUTION INFILTRATION; PERINEURAL PRN
Status: DISCONTINUED | OUTPATIENT
Start: 2020-07-13 | End: 2020-07-14 | Stop reason: HOSPADM

## 2020-07-13 RX ORDER — PROPOFOL 10 MG/ML
INJECTION, EMULSION INTRAVENOUS PRN
Status: DISCONTINUED | OUTPATIENT
Start: 2020-07-13 | End: 2020-07-14

## 2020-07-13 RX ORDER — FENTANYL CITRATE 50 UG/ML
INJECTION, SOLUTION INTRAMUSCULAR; INTRAVENOUS PRN
Status: DISCONTINUED | OUTPATIENT
Start: 2020-07-13 | End: 2020-07-14

## 2020-07-13 RX ORDER — SODIUM CHLORIDE 9 MG/ML
1000 INJECTION, SOLUTION INTRAVENOUS CONTINUOUS
Status: DISCONTINUED | OUTPATIENT
Start: 2020-07-13 | End: 2020-07-14

## 2020-07-13 RX ORDER — DEXAMETHASONE SODIUM PHOSPHATE 4 MG/ML
INJECTION, SOLUTION INTRA-ARTICULAR; INTRALESIONAL; INTRAMUSCULAR; INTRAVENOUS; SOFT TISSUE PRN
Status: DISCONTINUED | OUTPATIENT
Start: 2020-07-13 | End: 2020-07-14

## 2020-07-13 RX ORDER — GLYCOPYRROLATE 0.2 MG/ML
INJECTION, SOLUTION INTRAMUSCULAR; INTRAVENOUS PRN
Status: DISCONTINUED | OUTPATIENT
Start: 2020-07-13 | End: 2020-07-14

## 2020-07-13 RX ADMIN — SODIUM CHLORIDE 1000 ML: 9 INJECTION, SOLUTION INTRAVENOUS at 18:43

## 2020-07-13 RX ADMIN — TAZOBACTAM SODIUM AND PIPERACILLIN SODIUM 4.5 G: 500; 4 INJECTION, SOLUTION INTRAVENOUS at 19:44

## 2020-07-13 RX ADMIN — GLYCOPYRROLATE 0.3 MG: 0.2 INJECTION, SOLUTION INTRAMUSCULAR; INTRAVENOUS at 21:49

## 2020-07-13 RX ADMIN — IOPAMIDOL 100 ML: 755 INJECTION, SOLUTION INTRAVENOUS at 18:31

## 2020-07-13 RX ADMIN — LIDOCAINE HYDROCHLORIDE 50 MG: 10 INJECTION, SOLUTION INFILTRATION; PERINEURAL at 21:24

## 2020-07-13 RX ADMIN — MIDAZOLAM 2 MG: 1 INJECTION INTRAMUSCULAR; INTRAVENOUS at 21:22

## 2020-07-13 RX ADMIN — PHENYLEPHRINE HYDROCHLORIDE 200 MCG: 10 INJECTION INTRAVENOUS at 21:49

## 2020-07-13 RX ADMIN — PROPOFOL 200 MG: 10 INJECTION, EMULSION INTRAVENOUS at 21:24

## 2020-07-13 RX ADMIN — FENTANYL CITRATE 150 MCG: 50 INJECTION, SOLUTION INTRAMUSCULAR; INTRAVENOUS at 21:24

## 2020-07-13 RX ADMIN — DEXAMETHASONE SODIUM PHOSPHATE 4 MG: 4 INJECTION, SOLUTION INTRA-ARTICULAR; INTRALESIONAL; INTRAMUSCULAR; INTRAVENOUS; SOFT TISSUE at 23:31

## 2020-07-13 RX ADMIN — HYDROMORPHONE HYDROCHLORIDE 1 MG: 1 INJECTION, SOLUTION INTRAMUSCULAR; INTRAVENOUS; SUBCUTANEOUS at 21:36

## 2020-07-13 RX ADMIN — SUGAMMADEX 200 MG: 100 INJECTION, SOLUTION INTRAVENOUS at 23:31

## 2020-07-13 RX ADMIN — ROCURONIUM BROMIDE 30 MG: 10 INJECTION INTRAVENOUS at 21:24

## 2020-07-13 RX ADMIN — EPHEDRINE SULFATE 10 MG: 50 INJECTION, SOLUTION INTRAVENOUS at 21:39

## 2020-07-13 RX ADMIN — SODIUM CHLORIDE, POTASSIUM CHLORIDE, SODIUM LACTATE AND CALCIUM CHLORIDE: 600; 310; 30; 20 INJECTION, SOLUTION INTRAVENOUS at 21:22

## 2020-07-13 RX ADMIN — ONDANSETRON 4 MG: 2 INJECTION INTRAMUSCULAR; INTRAVENOUS at 23:31

## 2020-07-13 RX ADMIN — FENTANYL CITRATE 100 MCG: 50 INJECTION, SOLUTION INTRAMUSCULAR; INTRAVENOUS at 21:22

## 2020-07-13 RX ADMIN — SODIUM CHLORIDE 67 ML: 9 INJECTION, SOLUTION INTRAVENOUS at 18:31

## 2020-07-13 ASSESSMENT — LIFESTYLE VARIABLES: TOBACCO_USE: 1

## 2020-07-13 NOTE — ED PROVIDER NOTES
"  History     Chief Complaint   Patient presents with     Abdominal Pain     RLQ  abd pain      HPI  Saroj Summers is a 59 year old male who presents to the emergency department for evaluation of 3 days of lower abdominal pain which is greatest in the left lower quadrant.  Insidious onset of pain which is gradually worsened and is now moderate to severe, constant, poorly localized and associated with a fever of 101 yesterday. No relief with antacids, Pepto-Bismol, and magnesium citrate.  After taking magnesium citrate yesterday he developed \"dark tarry stool\", prior to this BM the preceding day was normal.  No other evidence of GI bleeding.  No back or flank pain, no UTI signs or symptoms or hematuria.  No prior abdominal surgeries.    Allergies:  Allergies   Allergen Reactions     Nka [No Known Allergies]        Problem List:    Patient Active Problem List    Diagnosis Date Noted     PTSD (post-traumatic stress disorder) 06/15/2015     Priority: Medium     Anxiety state 09/13/2012     Priority: Medium     Problem list name updated by automated process. Provider to review       Health Care Home 09/10/2012     Priority: Medium     Status: Pt is not currently in active care coordination.            Tobacco abuse 08/15/2012     Priority: Medium     Mild major depression (H) 08/15/2012     Priority: Medium     Alcoholism (H) 08/15/2012     Priority: Medium     [facility] relapse 9/7/12  [facility]'s [facility]  December 17, 2012 Herb is living at a assisted house, has been in CD treatment,         Diverticulosis of sigmoid colon 11/12/2009     Priority: Medium     Overview:   per colonoscopy       Depressive disorder 11/15/2007     Priority: Medium     Overview:   Had a lot going on. Sons brain surgeries, wife's bipolar and alcoholism......       Encounter for general adult medical examination without abnormal findings 11/15/2007     Priority: Medium     Recheck lipid and A1C @2/18  Overview:   LAST CPE " 1999       Family history of malignant neoplasm of gastrointestinal tract 11/15/2007     Priority: Medium     Overview:   Father had colon cancer at 51yr and  52yr          Past Medical History:    No past medical history on file.    Past Surgical History:    No past surgical history on file.    Family History:    Family History   Problem Relation Age of Onset     Cancer - colorectal Father      Alcohol/Drug Father      Cancer Paternal Grandmother      C.A.D. Paternal Grandfather      Neurologic Disorder Son         brain trauma at birth       Social History:  Marital Status:   [4]  Social History     Tobacco Use     Smoking status: Former Smoker     Packs/day: 15.00     Years: 0.50     Pack years: 7.50     Last attempt to quit:      Years since quittin.5     Smokeless tobacco: Current User   Substance Use Topics     Alcohol use: Not Currently     Comment: sober 2012     Drug use: No        Medications:    albuterol (PROVENTIL HFA: VENTOLIN HFA) 108 (90 BASE) MCG/ACT inhaler  albuterol (PROVENTIL HFA: VENTOLIN HFA) 108 (90 BASE) MCG/ACT inhaler  busPIRone (BUSPAR) 5 MG tablet  busPIRone (BUSPAR) 5 MG tablet  FLUoxetine (PROZAC) 20 MG capsule  FLUoxetine (PROZAC) 20 MG capsule  fluticasone (FLONASE) 50 MCG/ACT nasal spray  loratadine (CLARITIN) 10 MG tablet        Review of Systems  As mentioned above in the history present illness.  All other systems were reviewed and are negative.      Physical Exam   BP: 125/82  Pulse: 81  Temp: 98.5  F (36.9  C)  Resp: 18  SpO2: 96 %      Physical Exam  Vitals signs and nursing note reviewed.   Constitutional:       General: He is not in acute distress.     Appearance: Normal appearance. He is well-developed. He is not ill-appearing or diaphoretic.   HENT:      Head: Normocephalic and atraumatic.      Right Ear: External ear normal.      Left Ear: External ear normal.      Nose: Nose normal.      Mouth/Throat:      Mouth: Mucous membranes are moist.    Eyes:      General: No scleral icterus.     Extraocular Movements: Extraocular movements intact.      Conjunctiva/sclera: Conjunctivae normal.   Neck:      Musculoskeletal: Normal range of motion and neck supple.      Trachea: No tracheal deviation.   Cardiovascular:      Rate and Rhythm: Normal rate and regular rhythm.      Heart sounds: Normal heart sounds. No murmur. No friction rub. No gallop.    Pulmonary:      Effort: Pulmonary effort is normal. No respiratory distress.      Breath sounds: Normal breath sounds. No wheezing, rhonchi or rales.   Abdominal:      General: Bowel sounds are decreased. There is no distension.      Palpations: Abdomen is soft.      Tenderness: There is abdominal tenderness. There is guarding and rebound. There is no right CVA tenderness or left CVA tenderness. Positive signs include McBurney's sign.      Hernia: No hernia is present.   Musculoskeletal: Normal range of motion.         General: No tenderness.      Right lower leg: No edema.      Left lower leg: No edema.   Skin:     General: Skin is warm and dry.      Coloration: Skin is not pale.      Findings: No erythema or rash.   Neurological:      General: No focal deficit present.      Mental Status: He is alert and oriented to person, place, and time.      Coordination: Coordination normal.   Psychiatric:         Mood and Affect: Mood normal.         Behavior: Behavior normal.         ED Course        Procedures                 Results for orders placed or performed during the hospital encounter of 07/13/20 (from the past 24 hour(s))   CBC with platelets differential   Result Value Ref Range    WBC 11.8 (H) 4.0 - 11.0 10e9/L    RBC Count 4.84 4.4 - 5.9 10e12/L    Hemoglobin 15.0 13.3 - 17.7 g/dL    Hematocrit 44.4 40.0 - 53.0 %    MCV 92 78 - 100 fl    MCH 31.0 26.5 - 33.0 pg    MCHC 33.8 31.5 - 36.5 g/dL    RDW 13.6 10.0 - 15.0 %    Platelet Count 263 150 - 450 10e9/L    Diff Method Automated Method     % Neutrophils 72.4 %     % Lymphocytes 14.2 %    % Monocytes 8.6 %    % Eosinophils 2.3 %    % Basophils 0.6 %    % Immature Granulocytes 1.9 %    Nucleated RBCs 0 0 /100    Absolute Neutrophil 8.5 (H) 1.6 - 8.3 10e9/L    Absolute Lymphocytes 1.7 0.8 - 5.3 10e9/L    Absolute Monocytes 1.0 0.0 - 1.3 10e9/L    Absolute Eosinophils 0.3 0.0 - 0.7 10e9/L    Absolute Basophils 0.1 0.0 - 0.2 10e9/L    Abs Immature Granulocytes 0.2 0 - 0.4 10e9/L    Absolute Nucleated RBC 0.0    Comprehensive metabolic panel   Result Value Ref Range    Sodium 138 133 - 144 mmol/L    Potassium 3.8 3.4 - 5.3 mmol/L    Chloride 106 94 - 109 mmol/L    Carbon Dioxide 25 20 - 32 mmol/L    Anion Gap 7 3 - 14 mmol/L    Glucose 89 70 - 99 mg/dL    Urea Nitrogen 14 7 - 30 mg/dL    Creatinine 0.86 0.66 - 1.25 mg/dL    GFR Estimate >90 >60 mL/min/[1.73_m2]    GFR Estimate If Black >90 >60 mL/min/[1.73_m2]    Calcium 9.0 8.5 - 10.1 mg/dL    Bilirubin Total 0.5 0.2 - 1.3 mg/dL    Albumin 3.5 3.4 - 5.0 g/dL    Protein Total 7.9 6.8 - 8.8 g/dL    Alkaline Phosphatase 95 40 - 150 U/L    ALT 29 0 - 70 U/L    AST 21 0 - 45 U/L   CT Abdomen Pelvis w Contrast    Narrative    EXAM: CT ABDOMEN PELVIS W CONTRAST  LOCATION: [facility]  DATE/TIME: 7/13/2020 6:30 PM    INDICATION: Abdominal pain most severe in right lower quadrant.  COMPARISON: None.  TECHNIQUE: CT scan of the abdomen and pelvis was performed following injection of IV contrast. Multiplanar reformats were obtained. Dose reduction techniques were used.  CONTRAST: 100 mL Isovue 370    FINDINGS:   LOWER CHEST: Emphysema.    HEPATOBILIARY: Hepatic steatosis.    PANCREAS: Normal.    SPLEEN: Normal.    ADRENAL GLANDS: 2.4 x 2.1 cm indeterminate right adrenal mass with Hounsfield units of 115.    KIDNEYS/BLADDER: 3 cm simple right renal cyst requiring no further evaluation. No hydronephrosis. Mild diffuse thickening of the urinary bladder accentuated from incomplete distention.    BOWEL: There is an inflammatory  process in the right lower quadrant inseparable from the terminal ileum and appendix with tiny bubbles of extraluminal gas and trace free fluid. No definite abscess. Appendix is normal in caliber though it demonstrates   increased mucosal enhancement. No bowel obstruction. Colonic diverticulosis without diverticulitis.    LYMPH NODES: Normal.    VASCULATURE: Atherosclerotic plaque. No aortic aneurysm.    PELVIC ORGANS: Normal.    MUSCULOSKELETAL: Very small fat-containing right inguinal hernia. Small fat-containing paraumbilical hernia. Degenerative disease. Presumed bone islands.      Impression    IMPRESSION:   1.  Inflammatory process in right lower quadrant with tiny bubbles of extraluminal gas and trace fluid statistically more likely to represent microperforation of acute appendicitis although terminal ileitis with microperforation cannot be excluded.   Surgical consult recommended.  2.  Small fat-containing paraumbilical hernia, tiny fat-containing right inguinal hernia. No obstruction.  3.  Colonic diverticulosis without diverticulitis.  4.  2.4 x 2.1 cm indeterminate right adrenal mass. Prior would be most helpful to confirm this is benign as it has indeterminate Hounsfield units. If none, follow-up dedicated adrenal CT with washout or MRI recommended as an outpatient.  5.  Hepatic steatosis.  6.  Emphysema.  7.  Mild bladder wall thickening, could be artifact from incomplete distention, correlate with urinalysis.       Medications   0.9% sodium chloride BOLUS (1,000 mLs Intravenous New Bag 7/13/20 1843)   sodium chloride 0.9% infusion (has no administration in time range)   piperacillin-tazobactam (ZOSYN) intermittent infusion 4.5 g (has no administration in time range)   iopamidol (ISOVUE-370) solution 100 mL (100 mLs Intravenous Given 7/13/20 1831)   sodium chloride 0.9 % bag 500mL for CT scan flush use (67 mLs Intravenous Given 7/13/20 1831)     I reviewed the results of the CT evaluation with him and  reviewed the incidental findings, including the finding of an indeterminate right adrenal mass and his need for further follow-up.  He expressed understanding of this and will pursue this with his primary care provider in the near future.  I provided him a copy of his CT report to facilitate this.    7:28 PM - I reviewed the case and consulted with Dr. Vega, surgeon on-call.  He will see the patient in the ED after an appendectomy case she is well to start.  We will initiate Zosyn IV.    8:51 PM - Dr. Vega saw the patient in the emergency department and will take the patient to the operating room.    Assessments & Plan (with Medical Decision Making)   Acute appendicitis with evidence of microperforation.  He was given IV fluid bolus and IV Zosyn.  Surgeon on-call was consulted and will take patient to the operating room this evening.           Pre-OP  Clearance     Please see the body of above note or dictation for the history and physical.      No medical contraindication to emergent surgery or anesthesia identified. No additional modifiable risk identified.  Informed consent deferred to surgeon.      I have reviewed the nursing notes.    I have reviewed the findings, diagnosis, plan and need for follow up with the patient.    New Prescriptions    No medications on file       Final diagnoses:   Acute appendicitis with perforation and localized peritonitis, without abscess or gangrene   Adrenal mass, right (H) - Indeterminate       7/13/2020   [facility] EMERGENCY DEPARTMENT     Daniel Lawrence MD  07/17/20 [id number]

## 2020-07-13 NOTE — ED NOTES
Lower abd pain x2 days.  Took mag citrate to see if he had constipation.  States pain is worse on the right lower side.  Denies n/v.  Pt is concerned about appendix.  No hx of SBO.

## 2020-07-14 VITALS
HEIGHT: 74 IN | HEART RATE: 67 BPM | OXYGEN SATURATION: 96 % | SYSTOLIC BLOOD PRESSURE: 111 MMHG | TEMPERATURE: 98.3 F | DIASTOLIC BLOOD PRESSURE: 69 MMHG | BODY MASS INDEX: 26.23 KG/M2 | RESPIRATION RATE: 16 BRPM | WEIGHT: 204.37 LBS

## 2020-07-14 PROBLEM — K35.33 APPENDICITIS WITH ABSCESS: Status: ACTIVE | Noted: 2020-07-14

## 2020-07-14 LAB
CREAT SERPL-MCNC: 0.9 MG/DL (ref 0.66–1.25)
GFR SERPL CREATININE-BSD FRML MDRD: >90 ML/MIN/{1.73_M2}
GLUCOSE BLDC GLUCOMTR-MCNC: 175 MG/DL (ref 70–99)
PLATELET # BLD AUTO: 239 10E9/L (ref 150–450)

## 2020-07-14 PROCEDURE — 25000132 ZZH RX MED GY IP 250 OP 250 PS 637: Performed by: SURGERY

## 2020-07-14 PROCEDURE — 82565 ASSAY OF CREATININE: CPT | Performed by: SURGERY

## 2020-07-14 PROCEDURE — 00000146 ZZHCL STATISTIC GLUCOSE BY METER IP

## 2020-07-14 PROCEDURE — 25000128 H RX IP 250 OP 636: Performed by: SURGERY

## 2020-07-14 PROCEDURE — 85049 AUTOMATED PLATELET COUNT: CPT | Performed by: SURGERY

## 2020-07-14 PROCEDURE — 36415 COLL VENOUS BLD VENIPUNCTURE: CPT | Performed by: SURGERY

## 2020-07-14 RX ORDER — ONDANSETRON 2 MG/ML
4 INJECTION INTRAMUSCULAR; INTRAVENOUS EVERY 6 HOURS PRN
Status: DISCONTINUED | OUTPATIENT
Start: 2020-07-14 | End: 2020-07-14 | Stop reason: HOSPADM

## 2020-07-14 RX ORDER — ALBUTEROL SULFATE 0.83 MG/ML
2.5 SOLUTION RESPIRATORY (INHALATION) EVERY 4 HOURS PRN
Status: DISCONTINUED | OUTPATIENT
Start: 2020-07-14 | End: 2020-07-14 | Stop reason: HOSPADM

## 2020-07-14 RX ORDER — KETOROLAC TROMETHAMINE 30 MG/ML
30 INJECTION, SOLUTION INTRAMUSCULAR; INTRAVENOUS
Status: DISCONTINUED | OUTPATIENT
Start: 2020-07-14 | End: 2020-07-14 | Stop reason: HOSPADM

## 2020-07-14 RX ORDER — ERTAPENEM 1 G/1
1 INJECTION, POWDER, LYOPHILIZED, FOR SOLUTION INTRAMUSCULAR; INTRAVENOUS EVERY 24 HOURS
Status: DISCONTINUED | OUTPATIENT
Start: 2020-07-14 | End: 2020-07-14 | Stop reason: HOSPADM

## 2020-07-14 RX ORDER — ACETAMINOPHEN 325 MG/1
650 TABLET ORAL EVERY 8 HOURS
Status: DISCONTINUED | OUTPATIENT
Start: 2020-07-14 | End: 2020-07-14 | Stop reason: HOSPADM

## 2020-07-14 RX ORDER — HYDROMORPHONE HYDROCHLORIDE 1 MG/ML
.3-.5 INJECTION, SOLUTION INTRAMUSCULAR; INTRAVENOUS; SUBCUTANEOUS
Status: DISCONTINUED | OUTPATIENT
Start: 2020-07-14 | End: 2020-07-14 | Stop reason: HOSPADM

## 2020-07-14 RX ORDER — ONDANSETRON 4 MG/1
4 TABLET, ORALLY DISINTEGRATING ORAL EVERY 30 MIN PRN
Status: DISCONTINUED | OUTPATIENT
Start: 2020-07-14 | End: 2020-07-14 | Stop reason: HOSPADM

## 2020-07-14 RX ORDER — OXYCODONE HYDROCHLORIDE 5 MG/1
5 TABLET ORAL EVERY 6 HOURS PRN
Qty: 12 TABLET | Refills: 0 | Status: SHIPPED | OUTPATIENT
Start: 2020-07-14 | End: 2020-07-17

## 2020-07-14 RX ORDER — DOCUSATE SODIUM 100 MG/1
100 CAPSULE, LIQUID FILLED ORAL 2 TIMES DAILY
Refills: 0 | COMMUNITY
Start: 2020-07-14 | End: 2020-09-05

## 2020-07-14 RX ORDER — SODIUM CHLORIDE, SODIUM LACTATE, POTASSIUM CHLORIDE, CALCIUM CHLORIDE 600; 310; 30; 20 MG/100ML; MG/100ML; MG/100ML; MG/100ML
INJECTION, SOLUTION INTRAVENOUS CONTINUOUS
Status: DISCONTINUED | OUTPATIENT
Start: 2020-07-14 | End: 2020-07-14 | Stop reason: HOSPADM

## 2020-07-14 RX ORDER — NALOXONE HYDROCHLORIDE 0.4 MG/ML
.1-.4 INJECTION, SOLUTION INTRAMUSCULAR; INTRAVENOUS; SUBCUTANEOUS
Status: DISCONTINUED | OUTPATIENT
Start: 2020-07-14 | End: 2020-07-14 | Stop reason: HOSPADM

## 2020-07-14 RX ORDER — OXYCODONE HYDROCHLORIDE 5 MG/1
5-10 TABLET ORAL
Status: DISCONTINUED | OUTPATIENT
Start: 2020-07-14 | End: 2020-07-14 | Stop reason: HOSPADM

## 2020-07-14 RX ORDER — MEPERIDINE HYDROCHLORIDE 25 MG/ML
12.5 INJECTION INTRAMUSCULAR; INTRAVENOUS; SUBCUTANEOUS EVERY 5 MIN PRN
Status: DISCONTINUED | OUTPATIENT
Start: 2020-07-14 | End: 2020-07-14 | Stop reason: HOSPADM

## 2020-07-14 RX ORDER — ONDANSETRON 2 MG/ML
4 INJECTION INTRAMUSCULAR; INTRAVENOUS EVERY 30 MIN PRN
Status: DISCONTINUED | OUTPATIENT
Start: 2020-07-14 | End: 2020-07-14 | Stop reason: HOSPADM

## 2020-07-14 RX ORDER — ACETAMINOPHEN 325 MG/1
650 TABLET ORAL EVERY 4 HOURS PRN
Status: DISCONTINUED | OUTPATIENT
Start: 2020-07-16 | End: 2020-07-14 | Stop reason: HOSPADM

## 2020-07-14 RX ORDER — PROCHLORPERAZINE MALEATE 5 MG
10 TABLET ORAL EVERY 6 HOURS PRN
Status: DISCONTINUED | OUTPATIENT
Start: 2020-07-14 | End: 2020-07-14 | Stop reason: HOSPADM

## 2020-07-14 RX ORDER — HYDRALAZINE HYDROCHLORIDE 20 MG/ML
2.5-5 INJECTION INTRAMUSCULAR; INTRAVENOUS EVERY 10 MIN PRN
Status: DISCONTINUED | OUTPATIENT
Start: 2020-07-14 | End: 2020-07-14 | Stop reason: HOSPADM

## 2020-07-14 RX ORDER — FENTANYL CITRATE 50 UG/ML
25-50 INJECTION, SOLUTION INTRAMUSCULAR; INTRAVENOUS
Status: DISCONTINUED | OUTPATIENT
Start: 2020-07-14 | End: 2020-07-14 | Stop reason: HOSPADM

## 2020-07-14 RX ORDER — LIDOCAINE 40 MG/G
CREAM TOPICAL
Status: DISCONTINUED | OUTPATIENT
Start: 2020-07-14 | End: 2020-07-14 | Stop reason: HOSPADM

## 2020-07-14 RX ORDER — ONDANSETRON 4 MG/1
4 TABLET, ORALLY DISINTEGRATING ORAL EVERY 6 HOURS PRN
Status: DISCONTINUED | OUTPATIENT
Start: 2020-07-14 | End: 2020-07-14 | Stop reason: HOSPADM

## 2020-07-14 RX ORDER — NALOXONE HYDROCHLORIDE 0.4 MG/ML
.1-.4 INJECTION, SOLUTION INTRAMUSCULAR; INTRAVENOUS; SUBCUTANEOUS
Status: DISCONTINUED | OUTPATIENT
Start: 2020-07-14 | End: 2020-07-14

## 2020-07-14 RX ADMIN — ERTAPENEM 1 G: 1 INJECTION INTRAMUSCULAR; INTRAVENOUS at 01:40

## 2020-07-14 RX ADMIN — OXYCODONE HYDROCHLORIDE 5 MG: 5 TABLET ORAL at 02:48

## 2020-07-14 RX ADMIN — ERTAPENEM 1 G: 1 INJECTION INTRAMUSCULAR; INTRAVENOUS at 17:36

## 2020-07-14 RX ADMIN — HYDROMORPHONE HYDROCHLORIDE 0.5 MG: 1 INJECTION, SOLUTION INTRAMUSCULAR; INTRAVENOUS; SUBCUTANEOUS at 03:53

## 2020-07-14 RX ADMIN — HYDROMORPHONE HYDROCHLORIDE 0.3 MG: 1 INJECTION, SOLUTION INTRAMUSCULAR; INTRAVENOUS; SUBCUTANEOUS at 01:32

## 2020-07-14 RX ADMIN — ACETAMINOPHEN 650 MG: 325 TABLET, FILM COATED ORAL at 01:41

## 2020-07-14 ASSESSMENT — ACTIVITIES OF DAILY LIVING (ADL)
ADLS_ACUITY_SCORE: 11

## 2020-07-14 ASSESSMENT — MIFFLIN-ST. JEOR: SCORE: 1806.75

## 2020-07-14 NOTE — PROGRESS NOTES
Informed patient that he could discharge after IV antibiotic (per pharmacy he can have after 1730)  and after he was able to void 1-2 times without post void residuals.      Patient somewhat hostile about criteria.  Reported he feels manipulated by the hospital.  RN sat at bedside with him to discuss these feelings, he remained annoyed and has distrust in the healthcare system.  He states he is upset with himself that he allowed us to straight cath him and feels he could have relieved his bladder independently.  RN did allow him to attempt to void for 1.5 hours this am with a 745 ml bladder distention prior to straight cath due to his refusals.  He talks of his hospital experiences with his disabled son and how he has had bad experiences of being lied to by neurologists.  He reports he feels we are looking for problems.  He appears relatively agreeable at this time to follow doctor orders for IV antibiotic and to monitor post void retention.     1345: voided 375, post void 2 ml.  Patient in good spirits now and is calm and very pleasant with staff.

## 2020-07-14 NOTE — PROGRESS NOTES
Pt reports he has $900 in cash on him. Pt educated on the security envelopes that this unit has as a resource against loss or theft - Pt declines at this time. Writer and RN asked Pt if he would like to count the money in front of nurses as proof of ownership. Pt declined.    Kehinde Reyes, RN on 7/14/2020 at 2:05 AM

## 2020-07-14 NOTE — PLAN OF CARE
"[facility] [place]    Patient admitted to room 2307 at approximately 0105 via cart from emergency room. Patient was accompanied by transport tech.     Verbal SBAR report received from Kimberly TORRES prior to patient arrival.     Patient ambulated to bed with stand-by assist. Patient alert and oriented X 3. Pain is not well controlled.  Medication(s) being used: narcotic analgesics including Dilaudid. 0-10 Pain Scale: 7. Admission vital signs: Blood pressure 117/71, pulse 70, temperature 95.5  F (35.3  C), temperature source Oral, resp. rate 18, height 1.88 m (6' 2\"), weight 92.7 kg (204 lb 5.9 oz), SpO2 92 %. Patient was oriented to plan of care, call light, bed controls, tv, telephone, bathroom, and visiting hours.   Patient was in pain upon arrival to floor.  Pain increased and patient reported it was intolerable.  IV Dilaudid 0.3 mg given.  Patient also given a folded bath blanket to support abdomen.  Patient unable to remember home medications.  Patient stated he has $900.00 in his wallet.  Showed me his open wallet but would not take the cash out and count it in front of this writer.  Charge nurse notified to confirm patient's statement.  Patient refused to place money or wallet in safe.  Educated on risks of theft or loss, Patient continued to decline. Patient declined ordered Covid test.  Patient was informed of our policy on covid testing for all patients. Continued to refuse to take test.       Risk Assessment    The following safety risks were identified during admission: fall. Yellow risk band applied: YES.     Skin Initial Assessment    This writer admitted this patient and Patient refused skin check. Stated he had no preexisting wounds or pressure ulcers. Roel score in the Adult PCS flowsheet. Appropriate interventions initiated as needed.     Secondary skin check completed by: NA    Roel Risk Assessment  Sensory Perception: 4-->no impairment  Moisture: 4-->rarely moist  Activity: 3-->walks " occasionally  Mobility: 3-->slightly limited  Nutrition: 3-->adequate  Friction and Shear: 3-->no apparent problem  Roel Score: 20  Bed Support Surface: Atmos Air mattress    Education    Patient has a Earth to Observation order: No  Observation education completed and documented: N/A      Liz Ford RN

## 2020-07-14 NOTE — DISCHARGE INSTRUCTIONS
HOME CARE FOLLOWING ABDOMINAL SURGERY    INCISIONAL CARE:  Replace the bandage over your incision (or incisions) until all drainage stops, or if more comfortable to have in place.  If present, leave the steri-strips (white paper tapes) in place for 14 days after surgery.  If you have staples in your incision at the time of discharge, they will be removed at your follow-up appointment.  If Dermabond (a type of skin glue) is present, leave in place until it wears/flakes off.     BATHING:  Avoid baths for 1 week after surgery.  Showers are okay.  You may wash your hair at any time.  Gently pat your incision dry after bathing.    ACTIVITY:  Light Activity -- you may immediately be up and about as tolerated.  Driving -- you may drive when comfortable and off narcotic pain medications (example: Norco, Percocet, Hydrocodone).  Light Work -- resume when comfortable off pain medications.  (If you can drive, you probably can work.)  Strenuous Work/Activity -- limit lifting to 20 pounds for 4 weeks.  Then, progressively increase with time.  Active Sports (running, biking, etc.) -- cautiously resume after 6 weeks.  Most importantly listen to your body.  If an activity causes pain or discomfort please stop and try in a few days or decrease your intensity.    DISCOMFORT:  Use pain medications as prescribed by your surgeon.  Take the pain medication with some food, when possible, to minimize side effects.  Expect gradual improvement.      Narcotic Pain Medications:  Do not drive, make important decisions or operate heavy machinery while on narcotic pain medications.  Narcotic pain medications can be associated with nausea, sleep disturbance, and constipation.  Unless directed otherwise, please take an over the counter stool softener (Colace 100mg twice a day) unless directed otherwise.  As soon as you are able to stop narcotic pain medications the better. Long term use of narcotics is associated with tolerance (the need for more  medication for effect) and potential addiction.    DIET:  Return to diet you were on before surgery, unless you are given specific diet instructions.  Drink plenty of fluids.  While taking pain medications, increase dietary fiber or add a fiber supplementation like Metamucil or Citrucel to help prevent constipation - a possible side effect of pain medications.    NAUSEA:  If nauseated from the anesthetic/pain meds; rest in bed, get up cautiously with assistance, and drink clear liquids (juice, tea, broth).    RETURN APPOINTMENT:  Schedule a follow-up visit 2-3 weeks after discharge from the hospital.  Office Phone: [phone]     CONTACT US IF THE FOLLOWING DEVELOPS:   1. A fever that is above 101     2. If there is a large amount of drainage, bleeding, or swelling.   3. Severe pain that is not relieved by your prescription.   4. Drainage that is thick, cloudy, yellow, green or white.   5. Any other questions not answered by  Frequently Asked Questions  sheet.      FREQUENTLY ASKED QUESTIONS:    Q:  How should my incision look?    A:  Normally your incision will appear slightly swollen with light redness directly along the incision itself as it heals.  It may feel like a bump or ridge as the healing/scarring happens, and over time (3-4 months) this bump or ridge feeling should slowly go away.  In general, clear or pink watery drainage can be normal at first as your incision heals, but should decrease over time.    Q:  How do I know if my incision is infected?  A:  Look at your incision for signs of infection, like redness around the incision spreading to surrounding skin, or drainage of cloudy or foul-smelling drainage.  If you feel warm, check your temperature to see if you are running a fever.    **If any of these things occur, please notify the nurse at our office.  We may need you to come into the office for an incision check.      Q:  How do I take care of my incision?  A:  If you have a dressing in place -  Starting the day after surgery, replace the dressing 1-2 times a day until there is no further drainage from the incision.  At that time, a dressing is no longer needed.  Try to minimize tape on the skin if irritation is occurring at the tape sites.  If you have significant irritation from tape on the skin, please call the office to discuss other method of dressing your incision.    Small pieces of tape called  steri-strips  may be present directly overlying your incision; these may be removed 10 days after surgery unless otherwise specified by your surgeon.  If these tapes start to loosen at the ends, you may trim them back until they fall off or are removed.    A:  If you had  Dermabond  tissue glue used as a dressing (this causes your incision to look shiny with a clear covering over it) - This type of dressing wears off with time and does not require more dressings over the top unless it is draining around the glue as it wears off.  Do not apply ointments or lotions over the incisions until the glue has completely worn off.    Q:  There is a piece of tape or a sticky  lead  still on my skin.  Can I remove this?  A:  Sometimes the sticky  leads  used for monitoring during surgery or for evaluation in the emergency department are not all removed while you are in the hospital.  These sometimes have a tab or metal dot on them.  You can easily remove these on your own, like taking off a band-aid.  If there is a gel substance under the  lead , simply wipe/clean it off with a washcloth or paper towel.      Q:  What can I do to minimize constipation (very hard stools, or lack of stools)?  A:  Stay well hydrated.  Increase your dietary fiber intake or take a fiber supplement -with plenty of water.  Walk around frequently.  You may consider an over-the-counter stool-softener.  Your Pharmacist can assist you with choosing one that is stocked at your pharmacy.  Constipation is also one of the most common side effects of  pain medication.  If you are using pain medication, be pro-active and try to PREVENT problems with constipation by taking the steps above BEFORE constipation becomes a problem.    Q:  What do I do if I need more pain medications?  A:  Call the office to receive refills.  Be aware that certain pain meds cannot be called into a pharmacy and actually require a paper prescription.  A change may be made in your pain med as you progress thru your recovery period or if you have side effects to certain meds.    --Pain meds are NOT refilled after 5pm on weekdays, and NOT AT ALL on the weekends, so please look ahead to prevent problems.      Q:  Why am I having a hard time sleeping now that I am at home?  A:  Many medications you receive while you are in the hospital can impact your sleep for a number of days after your surgery/hospitalization.  Decreased level of activity and naps during the day may also make sleeping at night difficult.  Try to minimize day-time naps, and get up frequently during the day to walk around your home during your recovery time.  Sleep aides may be of some help, but are not recommended for long-term use.      Q:  I am having some back discomfort.  What should I do?  A:  This may be related to certain positioning that was required for your surgery, extended periods of time in bed, or other changes in your overall activity level.  You may try ice, heat, acetaminophen, or ibuprofen to treat this temporarily.  Note that many pain medications have acetaminophen in them and would state this on the prescription bottle.  Be sure not to exceed the maximum of 4000mg per day of acetaminophen.     **If the pain you are having does not resolve, is severe, or is a flare of back pain you have had on other occasions prior to surgery, please contact your primary physician for further recommendations or for an appointment to be examined at their office.    Q:  Why am I having headaches?  A:  Headaches can be caused  by many things:  caffeine withdrawal, use of pain meds, dehydration, high blood pressure, lack of sleep, over-activity/exhaustion, flare-up of usual migraine headaches.  If you feel this is related to muscle tension (a band-like feeling around the head, or a pressure at the low-back of the head) you may try ice or heat to this area.  You may need to drink more fluids (try electrolyte drink like Gatorade), rest, or take your usual migraine medications.   **If your headaches do not resolve, worsen, are accompanied by other symptoms, or if your blood pressure is high, please call your primary physician for recommendation and/or examination.    Q:  I am unable to urinate.  What do I do?  A:  A small percentage of people can have difficulty urinating initially after surgery.  This includes being able to urinate only a very small amount at a time and feeling discomfort or pressure in the very low abdomen.  This is called  urinary retention , and is actually an urgent situation.  Proceed to your nearest Emergency department for evaluation (not an Urgent Care Center).  Sometimes the bladder does not work correctly after certain medications you receive during surgery, or related to certain procedures.  You may need to have a catheter placed until your bladder recovers.  When planning to go to an Emergency department, it may help to call the ER to let them know you are coming in for this problem after a surgery.  This may help you get in quicker to be evaluated.  **If you have symptoms of a urinary tract infection, please contact your primary physician for the proper evaluation and treatment.          If you have other questions, please call the office Monday thru Friday between 8am and 5pm to discuss with the nurse.  # [phone]    There is a surgeon ON CALL on weekday evenings and over the weekend in case of urgent need only, and may be contacted at the same number.    If you are having an emergency, call 911 or proceed  to your nearest emergency department.

## 2020-07-14 NOTE — OP NOTE
Procedure Date: July 13, 2020    PREOPERATIVE DIAGNOSIS: 1. Perforated appendicitis    POSTOPERATIVE DIAGNOSIS: 1. Perforated appendicitis with abscess    PROCEDURE:  1. Laparoscopic Appendectomy    ATTENDING SURGEON and Assistants:   Surgeon(s):  Valeriano Vega DO    ESTIMATED BLOOD LOSS: 30 mL    FINDINGS:   1. The appendix appeared inflamed; there was evidence of a perforation. There was evidence of abscess.    INDICATIONS: Mr. Saroj Summers is a 59 year old white male who presents with clinical features consistent with acute appendicitis with microperforation. A discussion was held with the patient regarding indications, risks, benefits, and alternatives to surgery (including, but not limited to bleeding, infection, intra-abdominal injury, conversion from laparoscopy to open surgery, nontherapeutic operation, potential alternate etiology of presenting symptoms, abscess, potential need for additional treatments, and the cardiopulmonary risks associated with surgery and anesthesia). The patient's questions were addressed, and he consented to laparoscopic (versus open) appendectomy.     PROCEDURE: The patient was brought to the operating room, and placed in the supine position on the operating table. Lower extremity sequential compression devices were placed and functioning prior to delivery of general anesthesia, which included endotracheal intubation. A urinary catheter was not placed, as the patient urinated just prior to arrival to the operating room. The abdomen was then prepped and draped in the usual, sterile fashion. A procedural time out was performed.     An open Sis approach was undertaken via a periumbilical incision, and under direct visualization a 12-mm trochar was delivered and pneumoperitoneum administered. Additional 5mm trochars were delivered in the left upper and left lower quadrant abdomen under direct visualization via laparoscopy.     Exploration confirmed acute  appendicitis.  The appendiceal tip was free of disease and readily grasped.  Initially mesoappendix was taken down with the ligasure device.  The base of the appendix looped retrocecal and was densely adherent to the peritoneum.  To facilitate dissection, the white line of Toldt was taken down with the ligasure device to roll cecum medially.  The base of the appendix was located at this time.  The mesoappendix was dissected bluntly as it was quite edematous.  Purulent fluid began to drain from the mesoappendix at this time.  Dissection was tediously carried out to free the remainder of the appendix.  After a window was created in the appendix, a 45 mm blue PEREZ was passed at the appendiceal base and fired.  The remainder of the appendix was freed and placed in an endocatch and removed through the umbilical site.  The appendiceal base was examined and a 0-0 PDS endoloop was placed around the base due to the significant inflammation.  Satisfied with the closure, the appendiceal base was copiously irrigated.  The pelvis was likewise irrigated and suctioned free.  Suction was utilized to clear the field and in the assessment for hemostasis, which was assured. An instrument count, including laparotomy pads, sponges and needles was performed and found to be correct. Fascial closure at the 12mm port site was accomplished with a single 0-Vicryl suture.  Skin edges were re-approximated with 4-0 Monocryl suture, and the wounds were all cleansed and dried prior to application of sterile glue. The patient tolerated the procedure well, was extubated without incident, and transferred to the PACU in stable condition.    Valeriano Vega DO on 7/13/2020 at 11:41 PM

## 2020-07-14 NOTE — H&P
[facility]  [facility] - H&P     Saroj Summers MRN# [id number]   Age: 59 year old YOB: 1960     HPI:  Patient has been experiencing acute RLQ abdominal pain for the past 4 days associated with nausea and anorexia and malaise.  These symptoms have been increasing in severity. He has never had these symptoms prior.  No changes in his bowel habits.  He felt he may be constipated and took magnesium citrate with significant stool production, however no change in his stool.  There is a family history of colon cancer in his father.  His most recent colonoscopy was 4 years ago and negative    History is obtained from the patient    He is 7 years sober from alcohol addiction.    Review Of Systems:  Respiratory: No shortness of breath, dyspnea on exertion, cough, or hemoptysis  Cardiovascular: negative  Gastrointestinal: as above  Genitourinary: negative    PMH:  No past medical history on file.    PSH:  No past surgical history on file.    Allergies:  Allergies   Allergen Reactions     Nka [No Known Allergies]        Home Medications:  Current Outpatient Medications   Medication Sig Dispense Refill     albuterol (PROVENTIL HFA: VENTOLIN HFA) 108 (90 BASE) MCG/ACT inhaler Inhale 2 puffs into the lungs every 6 hours as needed for shortness of breath / dyspnea. (Patient not taking: Reported on 9/5/2019) 1 Inhaler 0     albuterol (PROVENTIL HFA: VENTOLIN HFA) 108 (90 BASE) MCG/ACT inhaler Inhale 2 puffs into the lungs every 6 hours as needed for shortness of breath / dyspnea. 3 Inhaler 1     busPIRone (BUSPAR) 5 MG tablet Take 1 tablet by mouth 2 times daily as needed. For anxiety (Patient not taking: Reported on 9/5/2019) 6 tablet 0     busPIRone (BUSPAR) 5 MG tablet Twice a day PRN anxiety   (Patient not taking: Reported on 9/5/2019) 60 tablet 0     FLUoxetine (PROZAC) 20 MG capsule Take 1 capsule by mouth daily. (Patient not taking: Reported on 9/5/2019) 3 capsule 0      FLUoxetine (PROZAC) 20 MG capsule Take 1 capsule by mouth every morning. (Patient not taking: Reported on 2019) 30 capsule 2     fluticasone (FLONASE) 50 MCG/ACT nasal spray Spray 1-2 sprays into both nostrils daily. (Patient not taking: Reported on 2019) 1 Package 1     loratadine (CLARITIN) 10 MG tablet Take 1 tablet by mouth daily. 30 tablet 1       Social History:  Social History     Tobacco Use     Smoking status: Former Smoker     Packs/day: 15.00     Years: 0.50     Pack years: 7.50     Last attempt to quit:      Years since quittin.5     Smokeless tobacco: Current User   Substance Use Topics     Alcohol use: Not Currently     Comment: sober 2012     Drug use: No       Family History:  No family history chronic diarrhea, inflammatory bowel disease     Objective:  /81   Pulse 71   Temp 98.5  F (36.9  C) (Oral)   Resp 18   SpO2 95%     General appearance: healthy, alert and mild distress  Hydration: well hydrated  Neck: normal, supple and no adenopathy  Lungs: normal and clear to auscultation  Heart: regular rate and rhythm and no murmurs, clicks, or gallops  Abdomen: Moderate RLQ TTP, positive Rovsing's sign.    Masses: none  Organomegaly: none    Labs Reviewed:  Lab Results   Component Value Date    WBC 11.8 2020     Lab Results   Component Value Date    HGB 15.0 2020     Lab Results   Component Value Date     2020       Last Basic Metabolic Panel:  Lab Results   Component Value Date     2020      Lab Results   Component Value Date    POTASSIUM 3.8 2020     Lab Results   Component Value Date    CHLORIDE 106 2020     Lab Results   Component Value Date    JOHN 9.0 2020     Lab Results   Component Value Date    CO2 25 2020     Lab Results   Component Value Date    BUN 14 2020     Lab Results   Component Value Date    CR 0.86 2020     Lab Results   Component Value Date    GLC 89 2020         Radiology:  CT  abdomen reveals a dilated, thick-walled appendix with surrounding fat stranding.  Microperforation is evident with small amount of free air adjacent to the appendix.  All imaging studies reviewed by me.    ASSESSMENT/PLAN:  The patient's history, physical exam, laboratory and imaging studies are suspicious for acute appendicitis.  I have offered the patient laparoscopic possible open appendectomy.  The risks, benefits, and alternatives have been discussed in detail.  All of the patient's questions have been answered.  They elect to proceed and we will go to the OR at the soonest availability.  Pre-operative antibiotics have been ordered.     Risks discussed include but are not limited to: bleeding, infection, hernia, need for additional treatment, nontherapeutic intervention, wound complication (such as dehiscence), conversion to open surgery, changes in bowel habits, and rare complications related to surgery and/or anesthesia such as venous thromboembolism and cardiorespiratory complications.    He will be admitted under my service after for IV antibiotic therapy.    Valeriano Vega, DO

## 2020-07-14 NOTE — PLAN OF CARE
[facility] DISCHARGE NOTE    Patient discharged to home at 6:17 PM via ambulation. Accompanied by other: self and staff. Discharge instructions reviewed with patient, opportunity offered to ask questions. Prescriptions filled and sent with patient upon discharge. All belongings sent with patient.    Marielena Mathews RN

## 2020-07-14 NOTE — ANESTHESIA CARE TRANSFER NOTE
Patient: Saroj Summers    Procedure(s):  Laparoscopic Appendectomy    Diagnosis: Acute appendicitis with perforation and localized peritonitis, without abscess or gangrene [K35.32]  Diagnosis Additional Information: No value filed.    Anesthesia Type:   General     Note:  Airway :Nasal Cannula  Patient transferred to:PACU  Handoff Report: Identifed the Patient, Identified the Reponsible Provider, Reviewed the pertinent medical history, Discussed the surgical course, Reviewed Intra-OP anesthesia mangement and issues during anesthesia, Set expectations for post-procedure period and Allowed opportunity for questions and acknowledgement of understanding      Vitals: (Last set prior to Anesthesia Care Transfer)    CRNA VITALS  7/13/2020 2331 - 7/14/2020 0001      7/13/2020             Pulse:  83    SpO2:  100 %    Resp Rate (set):  18                Electronically Signed By: David Waters CRNA, APRN CRNA  July 14, 2020  12:01 AM

## 2020-07-14 NOTE — PROGRESS NOTES
Received report that patient has not voided overnight and bladder scan not assessed.  Last void before surgery.      Had patient attempt to void, he was unable. Bladder scan assessed and results 745.    Patient refused straight cath at this time and wants to continue to try.  Concerns of over extended bladder explained, he verbalized understanding.  Will continue to monitor.

## 2020-07-14 NOTE — PROGRESS NOTES
Re-examined.  Feels less bloated.  Straight cath x 1 for 800.  Re-attempt void trial and rescan.  Can be discharged after antibiotics and if normal urinary function resumes later today. Abdomen less distended.  Passing flatus.  Tolerating diet without difficulty.    Discussed with Marielena Vega,  on 7/14/2020 at 12:22 PM

## 2020-07-14 NOTE — ANESTHESIA POSTPROCEDURE EVALUATION
Patient: Saroj Summers    Procedure(s):  Laparoscopic Appendectomy    Diagnosis:Acute appendicitis with perforation and localized peritonitis, without abscess or gangrene [K35.32]  Diagnosis Additional Information: No value filed.    Anesthesia Type:  General    Note:  Anesthesia Post Evaluation    Patient location during evaluation: Bedside  Patient participation: Able to fully participate in evaluation  Level of consciousness: awake and alert  Pain management: adequate  Airway patency: patent  Cardiovascular status: acceptable  Respiratory status: acceptable  Hydration status: acceptable  PONV: none     Anesthetic complications: None          Last vitals:  Vitals:    07/13/20 2015 07/13/20 2030 07/13/20 2045   BP: (!) 145/88 (!) 145/87 125/81   Pulse: 78 75 71   Resp:      Temp:      SpO2: 97% 97% 95%         Electronically Signed By: David Waters CRNA, APRN CRNA  July 14, 2020  12:02 AM

## 2020-07-14 NOTE — ANESTHESIA PREPROCEDURE EVALUATION
Anesthesia Pre-Procedure Evaluation    Patient: Saroj Summers   MRN: [id number] : 1960          Preoperative Diagnosis: Acute appendicitis with perforation and localized peritonitis, without abscess or gangrene [K35.32]    Procedure(s):  Laparoscopic appendectomy possible open appendectomy    No past medical history on file.  No past surgical history on file.    Anesthesia Evaluation     .             ROS/MED HX    ENT/Pulmonary:     (+)tobacco use, Past use , . .    Neurologic:       Cardiovascular:         METS/Exercise Tolerance:     Hematologic:         Musculoskeletal:         GI/Hepatic:     (+) appendicitis, liver disease, Other GI/Hepatic       Renal/Genitourinary:         Endo:         Psychiatric:     (+) psychiatric history depression, anxiety and other (comment)      Infectious Disease:         Malignancy:         Other:                          Physical Exam  Normal systems: cardiovascular, pulmonary and dental    Airway   Mallampati: II  TM distance: >3 FB  Neck ROM: full    Dental     Cardiovascular       Pulmonary             Lab Results   Component Value Date    WBC 11.8 (H) 2020    HGB 15.0 2020    HCT 44.4 2020     2020    CRP 3.0 06/10/2019     2020    POTASSIUM 3.8 2020    CHLORIDE 106 2020    CO2 25 2020    BUN 14 2020    CR 0.86 2020    GLC 89 2020    JOHN 9.0 2020    PHOS 4.1 2011    MAG 2.1 2011    ALBUMIN 3.5 2020    PROTTOTAL 7.9 2020    ALT 29 2020    AST 21 2020    GGT 23 08/15/2012    ALKPHOS 95 2020    BILITOTAL 0.5 2020    LIPASE 77 2011    AMYLASE 58 2011    PTT 28 2011    INR 0.98 2011    TSH 2.14 06/10/2019       Preop Vitals  BP Readings from Last 3 Encounters:   20 125/81   06/10/19 139/76   06/29/15 125/84    Pulse Readings from Last 3 Encounters:   20 71   06/10/19 94   06/29/15 63      Resp Readings  "from Last 3 Encounters:   07/13/20 18   06/10/19 16   06/29/15 16    SpO2 Readings from Last 3 Encounters:   07/13/20 95%   06/10/19 96%   06/29/15 98%      Temp Readings from Last 1 Encounters:   07/13/20 36.9  C (98.5  F) (Oral)    Ht Readings from Last 1 Encounters:   06/29/15 1.829 m (6')      Wt Readings from Last 1 Encounters:   06/10/19 88.9 kg (196 lb)    Estimated body mass index is 26.19 kg/m  as calculated from the following:    Height as of 9/5/19: 1.88 m (6' 2\").    Weight as of 9/5/19: 92.5 kg (204 lb).       Anesthesia Plan      History & Physical Review  History and physical reviewed and following examination; no interval change.    ASA Status:  3 emergent.    NPO Status:  > 6 hours    Plan for General with Intravenous induction. Maintenance will be Balanced.      Additional equipment: Videolaryngoscope        Postoperative Care      Consents  Anesthetic plan, risks, benefits and alternatives discussed with:  Patient..                 David Waters CRNA, APRN CRNA  "

## 2020-07-14 NOTE — PROGRESS NOTES
"Subjective:   Seen and examined.  Patient states \" I feel great, best I've felt in a week\".  No nausea or vomiting.  Tolerating clears.  No fevers or chills.  Passing flatus      I/O last 3 completed shifts:  In: 900 [P.O.:100; I.V.:800]  Out: 30 [Blood:30]     No current outpatient medications on file.         ROUTINE IP LABS (Last four results)  BMP  Recent Labs   Lab 07/14/20 0443 07/13/20  1724   NA  --  138   POTASSIUM  --  3.8   CHLORIDE  --  106   JOHN  --  9.0   CO2  --  25   BUN  --  14   CR 0.90 0.86   GLC  --  89     CBC  Recent Labs   Lab 07/14/20 0443 07/13/20  1724   WBC  --  11.8*   RBC  --  4.84   HGB  --  15.0   HCT  --  44.4   MCV  --  92   MCH  --  31.0   MCHC  --  33.8   RDW  --  13.6    263     INRNo lab results found in last 7 days.           Tcurrent: 97.6   B/P: 102/65  P: 65  R: 18  SpO2:90%        EXAM  AOX4 NAD  CTAB  RRR  S&NT; minimal distension +BS  No CCE        A/P:   1. POD 1 Laparoscopic appendectomy for perforation, with abscess  - Antibiotics: Invanz  - Pain: Dilaudid, Tylenol, Roxicodone, Motrin  - Have him ambulate  - May discharge later today depending on abdominal exam and second dose of Invanz; transition to Augmentin on discharge    2. Right adrenal mass--indeterminate  - Will need follow-up with labs and imaging    Valeriano Vega, DO on 7/14/2020 at 7:34 AM    "

## 2020-07-14 NOTE — PROGRESS NOTES
Straight cath complete for 800.  He has voided in the toilet since, he reports good amount/flow.  Refused morning tylenol, reports no pain and he will alert us if something is needed.  He anticipates discharge today, tolerating regular diet and independent with mobility.

## 2020-07-15 NOTE — DISCHARGE SUMMARY
[facility]    Discharge Summary  General Surgery    Date of Admission:  7/13/2020  Date of Discharge:  7/14/2020  6:27 PM  Discharging Provider: Valeriano Vega    Discharge Diagnoses   Patient Active Problem List   Diagnosis     Tobacco abuse     Mild major depression (H)     Alcoholism (H)     Health Care Home     Anxiety state     Depressive disorder     Diverticulosis of sigmoid colon     Encounter for general adult medical examination without abnormal findings     Family history of malignant neoplasm of gastrointestinal tract     PTSD (post-traumatic stress disorder)     Acute appendicitis with perforation and localized peritonitis, without abscess or gangrene     Appendicitis with abscess       Procedure/Surgery Information   Procedure: Procedure(s):  Laparoscopic Appendectomy   Surgeon(s): Surgeon(s) and Role:     * Valeriano Vega,  - Primary   Specimens: ID Type Source Tests Collected by Time Destination   A : Appendix Organ Appendix SURGICAL PATHOLOGY EXAM Valeriano Vega DO 7/13/2020  9:56 PM       Non-operative procedures None performed     History of Present Illness   Saroj Summers is a 59 year old male who presented with perforated appendicitis    Hospital Course   Saroj Summers was admitted on 7/13/2020.  The following problems were addressed during his hospitalization:  Acute perforated appendicitis: Patient underwent surgery, reocvered and received antibiotics.  On POD 1 he was stable, had stable vitals, was tolerating diet.      Post-operative pain control: included Hydromorphone (dilaudid) IV and Tylenol and will be Vicodin/hydrocodone on discharge.   # Discharge Pain Plan:   - During his hospitalization, Saroj experienced pain due to surgery.  The pain plan for discharge was discussed with Saroj and the plan was created in a collaborative fashion.    - Opioids prescribed on discharge: Norco  - Duration of opioids after discharge:  Per [facility] opioid prescribing guidelines, a 3 day prescription of opioids was provided.  - Bowel regimen: docusate      Antibiotics prescribed at discharge: Augmentin, Duration: 8 days     Imaging study follow up needs:   -Adrenal mass on right, needs follow-up and work-up.  To be arranged at surgery follow-up    Significant Findings: Perforated appendicitis    Valeriano Vega DO    Discharge Disposition   Discharged to home   Condition at discharge: Stable    Pending Results   Final pathology results: Pending at time of discharge  These results will be followed up by Valeriano Vega DO   Unresulted Labs Ordered in the Past 30 Days of this Admission     Date and Time Order Name Status Description    7/13/2020 2329 Surgical pathology exam In process           Primary Care Physician   [facility]    Please see physical exam from date of discharge    Consultations This Hospital Stay   None    Time Spent on this Encounter   I have spent less than 30 minutes on this discharge.    Discharge Orders      Reason for your hospital stay    Acute appendicitis with abscess     Follow-up and recommended labs and tests     Follow up with me,  Valeriano Vega DO, within 2 weeks. to evaluate after surgery.  Follow up testing to be discussed at post-op visit     Activity    Your activity upon discharge: no lifting, or strenuous exercise for 4 weeks     When to contact your care team    Call Dr. Vega at [phone] if you have any of the following: temperature greater than 100.4 or less than 97, increased drainage, nausea, vomiting or increased pain.     Discharge Medications   Discharge Medication List as of 7/14/2020  3:43 PM      START taking these medications    Details   amoxicillin-clavulanate (AUGMENTIN) 875-125 MG tablet Take 1 tablet by mouth 2 times daily, Disp-18 tablet,R-0, E-Prescribe      docusate sodium (COLACE) 100 MG capsule Take 1 capsule (100 mg) by mouth 2 times daily Take while on  opiate to prevent constipation, R-0, OTC      oxyCODONE (ROXICODONE) 5 MG tablet Take 1 tablet (5 mg) by mouth every 6 hours as needed for pain, Disp-12 tablet,R-0, E-Prescribe         CONTINUE these medications which have NOT CHANGED    Details   !! albuterol (PROVENTIL HFA: VENTOLIN HFA) 108 (90 BASE) MCG/ACT inhaler Inhale 2 puffs into the lungs every 6 hours as needed for shortness of breath / dyspnea., Disp-1 Inhaler, R-0, Normal      !! albuterol (PROVENTIL HFA: VENTOLIN HFA) 108 (90 BASE) MCG/ACT inhaler Inhale 2 puffs into the lungs every 6 hours as needed for shortness of breath / dyspnea., Disp-3 Inhaler, R-1, E-Prescribe      busPIRone (BUSPAR) 5 MG tablet Twice a day PRN anxiety  , Disp-60 tablet, R-0, Fax      FLUoxetine (PROZAC) 20 MG capsule Take 1 capsule by mouth every morning., Disp-30 capsule, R-2, E-Prescribe      fluticasone (FLONASE) 50 MCG/ACT nasal spray Spray 1-2 sprays into both nostrils daily., Disp-1 Package, R-1, Normal      loratadine (CLARITIN) 10 MG tablet Take 1 tablet by mouth daily., Disp-30 tablet, R-1, E-Prescribe       !! - Potential duplicate medications found. Please discuss with provider.        Allergies   Allergies   Allergen Reactions     Nka [No Known Allergies]      Valeriano Vega, DO on 7/15/2020 at 11:16 AM

## 2020-07-16 LAB — COPATH REPORT: NORMAL

## 2020-07-28 ENCOUNTER — OFFICE VISIT (OUTPATIENT)
Dept: SURGERY | Facility: CLINIC | Age: 60
End: 2020-07-28
Payer: COMMERCIAL

## 2020-07-28 VITALS
HEART RATE: 112 BPM | HEIGHT: 74 IN | WEIGHT: 204.37 LBS | SYSTOLIC BLOOD PRESSURE: 163 MMHG | BODY MASS INDEX: 26.23 KG/M2 | TEMPERATURE: 97.5 F | DIASTOLIC BLOOD PRESSURE: 107 MMHG

## 2020-07-28 DIAGNOSIS — Z90.49 S/P LAPAROSCOPIC APPENDECTOMY: Primary | ICD-10-CM

## 2020-07-28 PROCEDURE — 99024 POSTOP FOLLOW-UP VISIT: CPT | Performed by: SURGERY

## 2020-07-28 ASSESSMENT — MIFFLIN-ST. JEOR: SCORE: 1801.75

## 2020-07-28 NOTE — NURSING NOTE
"Initial BP (!) 163/100 (BP Location: Left arm, Patient Position: Sitting, Cuff Size: Adult Regular)   Pulse 102   Temp 97.5  F (36.4  C) (Tympanic)   Ht 1.88 m (6' 2\")   Wt 92.7 kg (204 lb 5.9 oz)   BMI 26.24 kg/m   Estimated body mass index is 26.24 kg/m  as calculated from the following:    Height as of this encounter: 1.88 m (6' 2\").    Weight as of this encounter: 92.7 kg (204 lb 5.9 oz). .    Verena Yung MA    "

## 2020-07-28 NOTE — PROGRESS NOTES
"General Surgery Post Op    Pt returns for follow up visit s/p laparoscopic appendectomy on 7/13/2020.    Patient has been doing well, tolerating diet. Bowels moving well. Pain controlled. No issues with wound healing/redness/drainage. No fevers.    He saw his PCP and had MRI of abdomen for adrenal mass.  Reviewed report which states it is an indeterminate lesion.  Recommended follow-up with PCP for referral to endocrine surgeon.    Physical exam: Vitals: BP (!) 163/107 (BP Location: Left arm, Patient Position: Sitting, Cuff Size: Adult Regular)   Pulse 112   Temp 97.5  F (36.4  C) (Tympanic)   Ht 1.88 m (6' 2\")   Wt 92.7 kg (204 lb 5.9 oz)   BMI 26.24 kg/m    BMI= Body mass index is 26.24 kg/m .    Exam:  Constitutional: healthy, alert and no distress  Cardiovascular: negative  Respiratory: negative  Gastrointestinal: Abdomen soft, non-tender. BS normal. No masses, organomegaly  Incisions C/D/I    Path:   Patient Name: WENDY SOTO   MR#: [id number]   Specimen #: [id number]   Collected: 7/13/2020   Received: 7/14/2020   Reported: 7/16/2020 11:08   Ordering Phy(s): NAN LANE     For improved result formatting, select 'View Enhanced Report Format' under    Linked Documents section.     SPECIMEN(S):   Appendix     FINAL DIAGNOSIS:   Appendix, laparoscopic appendectomy   - Subacute appendicitis         Assessment:     ICD-10-CM    1. S/P laparoscopic appendectomy  Z90.49      Plan: Mr. Soto is recovering without issue.  We reviewed his pathology, surgical images, and MRI in [facility] system.  I recommended follow-up with PCP regarding adrenal mass for further work-up and or referral to endocrine surgeon (offered referral if PCP unsure who to send to).  Discussed normal wound healing with patient. He can follow-up as needed.  Patient comfortable with this plan.    Herb to follow up with Primary Care provider regarding elevated blood pressure.      Nan Lane, DO on 7/27/2020 at 8:01 " PM

## 2020-07-28 NOTE — LETTER
"    7/28/2020         RE: Wendy Soto  [address]  [place] [place] [facility] [address]        Dear Colleague,    Thank you for referring your patient, Wendy Soto, to the [facility]. Please see a copy of my visit note below.    General Surgery Post Op    Pt returns for follow up visit s/p laparoscopic appendectomy on 7/13/2020.    Patient has been doing well, tolerating diet. Bowels moving well. Pain controlled. No issues with wound healing/redness/drainage. No fevers.    He saw his PCP and had MRI of abdomen for adrenal mass.  Reviewed report which states it is an indeterminate lesion.  Recommended follow-up with PCP for referral to endocrine surgeon.    Physical exam: Vitals: BP (!) 163/107 (BP Location: Left arm, Patient Position: Sitting, Cuff Size: Adult Regular)   Pulse 112   Temp 97.5  F (36.4  C) (Tympanic)   Ht 1.88 m (6' 2\")   Wt 92.7 kg (204 lb 5.9 oz)   BMI 26.24 kg/m    BMI= Body mass index is 26.24 kg/m .    Exam:  Constitutional: healthy, alert and no distress  Cardiovascular: negative  Respiratory: negative  Gastrointestinal: Abdomen soft, non-tender. BS normal. No masses, organomegaly  Incisions C/D/I    Path:   Patient Name: WENDY SOTO   MR#: [id number]   Specimen #: [id number]   Collected: 7/13/2020   Received: 7/14/2020   Reported: 7/16/2020 11:08   Ordering Phy(s): NAN LANE     For improved result formatting, select 'View Enhanced Report Format' under    Linked Documents section.     SPECIMEN(S):   Appendix     FINAL DIAGNOSIS:   Appendix, laparoscopic appendectomy   - Subacute appendicitis         Assessment:     ICD-10-CM    1. S/P laparoscopic appendectomy  Z90.49      Plan: Mr. Soto is recovering without issue.  We reviewed his pathology, surgical images, and MRI in [facility] system.  I recommended follow-up with PCP regarding adrenal mass for further work-up and or referral to endocrine surgeon (offered referral if PCP unsure who to send " to).  Discussed normal wound healing with patient. He can follow-up as needed.  Patient comfortable with this plan.    Herb to follow up with Primary Care provider regarding elevated blood pressure.      Valeriano Vega,  on 7/27/2020 at 8:01 PM        Again, thank you for allowing me to participate in the care of your patient.        Sincerely,        Valeriano Vega, DO

## 2020-09-05 ENCOUNTER — HOSPITAL ENCOUNTER (EMERGENCY)
Facility: CLINIC | Age: 60
Discharge: HOME | End: 2020-09-05
Attending: FAMILY MEDICINE | Admitting: FAMILY MEDICINE
Payer: COMMERCIAL

## 2020-09-05 ENCOUNTER — APPOINTMENT (OUTPATIENT)
Dept: CT IMAGING | Facility: CLINIC | Age: 60
End: 2020-09-05
Attending: FAMILY MEDICINE
Payer: COMMERCIAL

## 2020-09-05 VITALS
SYSTOLIC BLOOD PRESSURE: 141 MMHG | BODY MASS INDEX: 26.32 KG/M2 | HEART RATE: 86 BPM | WEIGHT: 205 LBS | OXYGEN SATURATION: 98 % | DIASTOLIC BLOOD PRESSURE: 101 MMHG | TEMPERATURE: 98.2 F | RESPIRATION RATE: 16 BRPM

## 2020-09-05 DIAGNOSIS — N21.0 BLADDER CALCULI: ICD-10-CM

## 2020-09-05 LAB
ALBUMIN SERPL-MCNC: 3.9 G/DL (ref 3.4–5)
ALBUMIN UR-MCNC: NEGATIVE MG/DL
ALP SERPL-CCNC: 161 U/L (ref 40–150)
ALT SERPL W P-5'-P-CCNC: 87 U/L (ref 0–70)
ANION GAP SERPL CALCULATED.3IONS-SCNC: 7 MMOL/L (ref 3–14)
APPEARANCE UR: ABNORMAL
AST SERPL W P-5'-P-CCNC: 76 U/L (ref 0–45)
BACTERIA #/AREA URNS HPF: ABNORMAL /HPF
BASOPHILS # BLD AUTO: 0.1 10E9/L (ref 0–0.2)
BASOPHILS NFR BLD AUTO: 1.2 %
BILIRUB SERPL-MCNC: 0.6 MG/DL (ref 0.2–1.3)
BILIRUB UR QL STRIP: NEGATIVE
BUN SERPL-MCNC: 11 MG/DL (ref 7–30)
CALCIUM SERPL-MCNC: 8.9 MG/DL (ref 8.5–10.1)
CHLORIDE SERPL-SCNC: 104 MMOL/L (ref 94–109)
CO2 SERPL-SCNC: 25 MMOL/L (ref 20–32)
COLOR UR AUTO: YELLOW
CREAT SERPL-MCNC: 0.87 MG/DL (ref 0.66–1.25)
CRP SERPL-MCNC: 4.5 MG/L (ref 0–8)
DIFFERENTIAL METHOD BLD: ABNORMAL
EOSINOPHIL # BLD AUTO: 0.2 10E9/L (ref 0–0.7)
EOSINOPHIL NFR BLD AUTO: 2.1 %
ERYTHROCYTE [DISTWIDTH] IN BLOOD BY AUTOMATED COUNT: 18.9 % (ref 10–15)
GFR SERPL CREATININE-BSD FRML MDRD: >90 ML/MIN/{1.73_M2}
GLUCOSE SERPL-MCNC: 115 MG/DL (ref 70–99)
GLUCOSE UR STRIP-MCNC: NEGATIVE MG/DL
HCT VFR BLD AUTO: 46.6 % (ref 40–53)
HGB BLD-MCNC: 16.1 G/DL (ref 13.3–17.7)
HGB UR QL STRIP: ABNORMAL
HYALINE CASTS #/AREA URNS LPF: 7 /LPF (ref 0–2)
IMM GRANULOCYTES # BLD: 0.1 10E9/L (ref 0–0.4)
IMM GRANULOCYTES NFR BLD: 0.9 %
KETONES UR STRIP-MCNC: NEGATIVE MG/DL
LACTATE BLD-SCNC: 2.2 MMOL/L (ref 0.7–2)
LEUKOCYTE ESTERASE UR QL STRIP: ABNORMAL
LIPASE SERPL-CCNC: 91 U/L (ref 73–393)
LYMPHOCYTES # BLD AUTO: 1.7 10E9/L (ref 0.8–5.3)
LYMPHOCYTES NFR BLD AUTO: 17 %
MCH RBC QN AUTO: 33.1 PG (ref 26.5–33)
MCHC RBC AUTO-ENTMCNC: 34.5 G/DL (ref 31.5–36.5)
MCV RBC AUTO: 96 FL (ref 78–100)
MONOCYTES # BLD AUTO: 0.9 10E9/L (ref 0–1.3)
MONOCYTES NFR BLD AUTO: 9 %
MUCOUS THREADS #/AREA URNS LPF: PRESENT /LPF
NEUTROPHILS # BLD AUTO: 6.9 10E9/L (ref 1.6–8.3)
NEUTROPHILS NFR BLD AUTO: 69.8 %
NITRATE UR QL: NEGATIVE
NRBC # BLD AUTO: 0 10*3/UL
NRBC BLD AUTO-RTO: 0 /100
PH UR STRIP: 5 PH (ref 5–7)
PLATELET # BLD AUTO: 232 10E9/L (ref 150–450)
POTASSIUM SERPL-SCNC: 3.5 MMOL/L (ref 3.4–5.3)
PROT SERPL-MCNC: 7.7 G/DL (ref 6.8–8.8)
RBC # BLD AUTO: 4.87 10E12/L (ref 4.4–5.9)
RBC #/AREA URNS AUTO: 56 /HPF (ref 0–2)
SODIUM SERPL-SCNC: 136 MMOL/L (ref 133–144)
SOURCE: ABNORMAL
SP GR UR STRIP: 1.02 (ref 1–1.03)
SQUAMOUS #/AREA URNS AUTO: 1 /HPF (ref 0–1)
UROBILINOGEN UR STRIP-MCNC: 0 MG/DL (ref 0–2)
WBC # BLD AUTO: 9.9 10E9/L (ref 4–11)
WBC #/AREA URNS AUTO: 9 /HPF (ref 0–5)

## 2020-09-05 PROCEDURE — 86140 C-REACTIVE PROTEIN: CPT | Performed by: FAMILY MEDICINE

## 2020-09-05 PROCEDURE — 80053 COMPREHEN METABOLIC PANEL: CPT | Performed by: FAMILY MEDICINE

## 2020-09-05 PROCEDURE — 83690 ASSAY OF LIPASE: CPT | Performed by: FAMILY MEDICINE

## 2020-09-05 PROCEDURE — 85025 COMPLETE CBC W/AUTO DIFF WBC: CPT | Performed by: FAMILY MEDICINE

## 2020-09-05 PROCEDURE — 83605 ASSAY OF LACTIC ACID: CPT | Performed by: FAMILY MEDICINE

## 2020-09-05 PROCEDURE — 81001 URINALYSIS AUTO W/SCOPE: CPT | Performed by: FAMILY MEDICINE

## 2020-09-05 PROCEDURE — 99284 EMERGENCY DEPT VISIT MOD MDM: CPT | Mod: 25 | Performed by: FAMILY MEDICINE

## 2020-09-05 PROCEDURE — 74176 CT ABD & PELVIS W/O CONTRAST: CPT

## 2020-09-05 PROCEDURE — 99284 EMERGENCY DEPT VISIT MOD MDM: CPT | Mod: Z6 | Performed by: FAMILY MEDICINE

## 2020-09-05 RX ORDER — ONDANSETRON 4 MG/1
4 TABLET, ORALLY DISINTEGRATING ORAL EVERY 8 HOURS PRN
Qty: 20 TABLET | Refills: 0 | Status: SHIPPED | OUTPATIENT
Start: 2020-09-05 | End: 2020-09-08

## 2020-09-05 RX ORDER — SODIUM CHLORIDE, SODIUM LACTATE, POTASSIUM CHLORIDE, CALCIUM CHLORIDE 600; 310; 30; 20 MG/100ML; MG/100ML; MG/100ML; MG/100ML
INJECTION, SOLUTION INTRAVENOUS CONTINUOUS
Status: DISCONTINUED | OUTPATIENT
Start: 2020-09-05 | End: 2020-09-05 | Stop reason: HOSPADM

## 2020-09-05 NOTE — ED PROVIDER NOTES
History     Chief Complaint   Patient presents with     Post-op Problem     appy was done last month, pt states he's concerned he's having a post op complication due to 2 days of RLQ abd pain. No fever     HPI  Saroj Summers is a 60 year old male, past medical history is significant for appendicitis with abscess on 7/14/2020, PTSD, anxiety, tobacco abuse, depression, alcoholism, diverticulosis, presents to the emergency department with concerns of approximately 48 hours of right lower quadrant abdominal pain.  History is obtained from the patient who states approximately 3 days of intermittent sharp somewhat cramping right lower quadrant abdominal pain which is different than what he had experienced with his appendix before presenting back on 7/14/2020 with a perforated appendix.  He is status post appendectomy from that time.  He has been doing well by his account up until 3 days ago.  He continues to move his bowels normally, no change in stool quality or frequency.  He notes no urinary tract symptoms.  No nausea or vomiting.  No fever chills or sweats.  He has not taken any medication for this up until an hour before he came into the emergency department when he took ibuprofen and he reports that the pain is somewhat improved.      Allergies:  Allergies   Allergen Reactions     Nka [No Known Allergies]        Problem List:    Patient Active Problem List    Diagnosis Date Noted     Appendicitis with abscess 07/14/2020     Priority: Medium     Acute appendicitis with perforation and localized peritonitis, without abscess or gangrene 07/13/2020     Priority: Medium     Added automatically from request for surgery [id number]       PTSD (post-traumatic stress disorder) 06/15/2015     Priority: Medium     Anxiety state 09/13/2012     Priority: Medium     Problem list name updated by automated process. Provider to review       Health Care Home 09/10/2012     Priority: Medium     Status: Pt is not currently in active  care coordination.            Tobacco abuse 08/15/2012     Priority: Medium     Mild major depression (H) 08/15/2012     Priority: Medium     Alcoholism (H) 08/15/2012     Priority: Medium     [facility] - 2010 relapse 12  [facility] 1984  2012 Herb is living at a [facility] house, has been in CD treatment,         Diverticulosis of sigmoid colon 2009     Priority: Medium     Overview:   per colonoscopy       Depressive disorder 11/15/2007     Priority: Medium     Overview:   Had a lot going on. Sons brain surgeries, wife's bipolar and alcoholism......       Encounter for general adult medical examination without abnormal findings 11/15/2007     Priority: Medium     Recheck lipid and A1C @  Overview:   LAST CPE 1999       Family history of malignant neoplasm of gastrointestinal tract 11/15/2007     Priority: Medium     Overview:   Father had colon cancer at 51yr and  52yr          Past Medical History:    No past medical history on file.    Past Surgical History:    Past Surgical History:   Procedure Laterality Date     LAPAROSCOPIC APPENDECTOMY N/A 2020    Procedure: Laparoscopic Appendectomy;  Surgeon: Valeriano Vega DO;  Location: [facility] OR       Family History:    Family History   Problem Relation Age of Onset     Cancer - colorectal Father      Alcohol/Drug Father      Cancer Paternal Grandmother      C.A.D. Paternal Grandfather      Neurologic Disorder Son         brain trauma at birth       Social History:  Marital Status:   [4]  Social History     Tobacco Use     Smoking status: Former Smoker     Packs/day: 15.00     Years: 0.50     Pack years: 7.50     Last attempt to quit:      Years since quittin.6     Smokeless tobacco: Current User     Types: Snuff   Substance Use Topics     Alcohol use: Not Currently     Comment: sober 2012     Drug use: No        Medications:    ondansetron (ZOFRAN ODT) 4 MG ODT tab          Review of Systems   All  other systems reviewed and are negative.      Physical Exam   BP: (!) 204/131  Pulse: 80  Temp: 98.2  F (36.8  C)  Resp: 16  Weight: 93 kg (205 lb)  SpO2: 97 %      Physical Exam  Vitals signs and nursing note reviewed.   Constitutional:       Appearance: Normal appearance. He is normal weight.   HENT:      Head: Normocephalic and atraumatic.      Right Ear: Tympanic membrane normal.      Left Ear: Tympanic membrane normal.      Nose: Nose normal.      Mouth/Throat:      Mouth: Mucous membranes are moist.      Pharynx: Oropharynx is clear.   Eyes:      Extraocular Movements: Extraocular movements intact.      Conjunctiva/sclera: Conjunctivae normal.      Pupils: Pupils are equal, round, and reactive to light.   Neck:      Musculoskeletal: Normal range of motion and neck supple.   Cardiovascular:      Rate and Rhythm: Normal rate and regular rhythm.      Pulses: Normal pulses.      Heart sounds: Normal heart sounds.   Pulmonary:      Effort: Pulmonary effort is normal.      Breath sounds: Normal breath sounds.   Abdominal:      General: Bowel sounds are normal.      Palpations: Abdomen is soft.      Tenderness: There is abdominal tenderness.      Comments: Tender right lower quadrant with deep palpation only.  Voluntary guarding.  No rebound, no referred pain   Musculoskeletal: Normal range of motion.   Skin:     General: Skin is warm and dry.      Capillary Refill: Capillary refill takes less than 2 seconds.   Neurological:      General: No focal deficit present.      Mental Status: He is alert and oriented to person, place, and time.   Psychiatric:         Mood and Affect: Mood normal.         Behavior: Behavior normal.         ED Course        Procedures             Labs Ordered and Resulted from Time of ED Arrival Up to the Time of Departure from the ED   CBC WITH PLATELETS DIFFERENTIAL - Abnormal; Notable for the following components:       Result Value    MCH 33.1 (*)     RDW 18.9 (*)     All other components  within normal limits   COMPREHENSIVE METABOLIC PANEL - Abnormal; Notable for the following components:    Glucose 115 (*)     Alkaline Phosphatase 161 (*)     ALT 87 (*)     AST 76 (*)     All other components within normal limits   LACTIC ACID WHOLE BLOOD - Abnormal; Notable for the following components:    Lactic Acid 2.2 (*)     All other components within normal limits   ROUTINE UA WITH MICROSCOPIC REFLEX TO CULTURE - Abnormal; Notable for the following components:    Blood Urine Large (*)     Leukocyte Esterase Urine Trace (*)     WBC Urine 9 (*)     RBC Urine 56 (*)     Bacteria Urine Few (*)     Mucous Urine Present (*)     Hyaline Casts 7 (*)     All other components within normal limits   CRP INFLAMMATION   LIPASE   PERIPHERAL IV CATHETER     CT ABDOMEN/PELVIS WITHOUT CONTRAST September 5, 2020 1:54 PM     CLINICAL HISTORY: Right lower quadrant abdominal pain status post  appendectomy for appendiceal perforation.     TECHNIQUE: CT scan of the abdomen and pelvis was performed without IV  contrast. Multiplanar reformats were obtained. Dose reduction  techniques were used.  CONTRAST: None.     COMPARISON: None.     FINDINGS:   LOWER CHEST: Mild emphysematous changes suggested.     HEPATOBILIARY: Diffuse hepatic steatosis. No significant mass. No bile  duct dilatation. No calcified gallstones.     PANCREAS: Normal.     SPLEEN: A few small incidental calcifications.     ADRENAL GLANDS: Normal.     KIDNEYS/BLADDER: Newly identified 0.3 cm stone within the right  posterior bladder lumen series 4 image 222. This is just medial to the  right ureterovesical junction. There is mild right hydronephrosis. No  additional urolithiasis. No left hydronephrosis. Incidental right  renal cysts not requiring specific follow-up.     BOWEL: Postoperative change compatible with appendectomy. A few  colonic diverticula. No acute bowel abnormality.     LYMPH NODES: Normal.     VASCULATURE: Calcifications.     PELVIC ORGANS:  Normal.     OTHER: None.     MUSCULOSKELETAL: No acute abnormality.                                                                      IMPRESSION:   1.  0.3 cm stone within the right posterior bladder lumen near the  right ureterovesical junction likely represents recently passed stone  into this area. There is mild right hydronephrosis.  2.  No other acute abnormality.  3.  Fatty liver.  4.  Appendectomy.     IVANNA FLOYD MD    Critical Care time:  none  Return to the room to discuss findings with the patient.  He is currently comfortable, I suspect that he is just recently passed a stone into his bladder as we visualized on CT.  His urine is most consistent with urolithiasis, I do not think that this represents infection.  He has no elevation of his systemic white count, mildly elevated lactate at 2.2 which I think is reflective of dehydration and I have no concern at the present time for Sirs/sepsis.  Patient is able to eat and drink without difficulties.  No nausea.  We discussed expectant management of his bladder calculi at this point, return if not improving or worse with use of Tylenol/ibuprofen as well as Zofran if needed for nausea, copious fluids orally.              No results found for this or any previous visit (from the past 24 hour(s)).    Medications - No data to display    Assessments & Plan (with Medical Decision Making)   Assessments and plan with medical decision making at the time stamp above.      Disclaimer: This note consists of symbols derived from keyboarding, dictation and/or voice recognition software. As a result, there may be errors in the script that have gone undetected. Please consider this when interpreting information found in this chart.      I have reviewed the nursing notes.    I have reviewed the findings, diagnosis, plan and need for follow up with the patient.       Discharge Medication List as of 9/5/2020  4:52 PM      START taking these medications    Details    ondansetron (ZOFRAN ODT) 4 MG ODT tab Take 1 tablet (4 mg) by mouth every 8 hours as needed for nausea or vomiting, Disp-20 tablet,R-0, Local Print             Final diagnoses:   Bladder calculi       9/5/2020   [facility] EMERGENCY DEPARTMENT     Luis Francisco MD  09/06/20 0114

## 2020-09-05 NOTE — DISCHARGE INSTRUCTIONS
Push fluids, rest.  Tylenol/ibuprofen as needed for pain.  Zofran if needed for nausea/vomiting.  Return if worse or changes.

## 2020-09-05 NOTE — ED NOTES
DATE:  9/5/2020   TIME OF RECEIPT FROM LAB:  7699  LAB TEST:  lactic  LAB VALUE:  2.2  RESULTS GIVEN WITH READ-BACK TO (PROVIDER):  chatwin  TIME LAB VALUE REPORTED TO PROVIDER:   3560

## 2020-09-05 NOTE — ED AVS SNAPSHOT
[facility] Emergency Department  [address] [address]  Phone:  [phone]  Fax:  [phone]                                    Saroj Summers   MRN: [id number]    Department:  [facility] Emergency Department   Date of Visit:  9/5/2020           After Visit Summary Signature Page    I have received my discharge instructions, and my questions have been answered. I have discussed any challenges I see with this plan with the nurse or doctor.    ..........................................................................................................................................  Patient/Patient Representative Signature      ..........................................................................................................................................  Patient Representative Print Name and Relationship to Patient    ..................................................               ................................................  Date                                   Time    ..........................................................................................................................................  Reviewed by Signature/Title    ...................................................              ..............................................  Date                                               Time          22EPIC Rev 08/18

## 2021-03-04 ENCOUNTER — HOSPITAL ENCOUNTER (EMERGENCY)
Facility: CLINIC | Age: 61
Discharge: ANOTHER HOSPITAL | End: 2021-03-05
Attending: EMERGENCY MEDICINE | Admitting: EMERGENCY MEDICINE
Payer: COMMERCIAL

## 2021-03-04 DIAGNOSIS — Z11.52 ENCOUNTER FOR SCREENING LABORATORY TESTING FOR SEVERE ACUTE RESPIRATORY SYNDROME CORONAVIRUS 2 (SARS-COV-2): ICD-10-CM

## 2021-03-04 DIAGNOSIS — R56.9 DRUG WITHDRAWAL SEIZURE WITHOUT COMPLICATION (H): ICD-10-CM

## 2021-03-04 DIAGNOSIS — F10.930 ALCOHOL WITHDRAWAL SYNDROME WITHOUT COMPLICATION (H): ICD-10-CM

## 2021-03-04 DIAGNOSIS — E87.6 HYPOKALEMIA: ICD-10-CM

## 2021-03-04 DIAGNOSIS — K92.2 GASTROINTESTINAL HEMORRHAGE, UNSPECIFIED GASTROINTESTINAL HEMORRHAGE TYPE: ICD-10-CM

## 2021-03-04 DIAGNOSIS — F19.930 DRUG WITHDRAWAL SEIZURE WITHOUT COMPLICATION (H): ICD-10-CM

## 2021-03-04 LAB
ABO + RH BLD: NORMAL
ABO + RH BLD: NORMAL
ALBUMIN SERPL-MCNC: 3.5 G/DL (ref 3.4–5)
ALP SERPL-CCNC: 110 U/L (ref 40–150)
ALT SERPL W P-5'-P-CCNC: 34 U/L (ref 0–70)
ANION GAP SERPL CALCULATED.3IONS-SCNC: 9 MMOL/L (ref 3–14)
AST SERPL W P-5'-P-CCNC: 27 U/L (ref 0–45)
BASOPHILS # BLD AUTO: 0.1 10E9/L (ref 0–0.2)
BASOPHILS NFR BLD AUTO: 0.8 %
BILIRUB SERPL-MCNC: 0.6 MG/DL (ref 0.2–1.3)
BLD GP AB SCN SERPL QL: NORMAL
BLOOD BANK CMNT PATIENT-IMP: NORMAL
BUN SERPL-MCNC: 14 MG/DL (ref 7–30)
CALCIUM SERPL-MCNC: 8.2 MG/DL (ref 8.5–10.1)
CHLORIDE SERPL-SCNC: 100 MMOL/L (ref 94–109)
CK SERPL-CCNC: 113 U/L (ref 30–300)
CO2 BLDCOV-SCNC: 28 MMOL/L (ref 21–28)
CO2 SERPL-SCNC: 28 MMOL/L (ref 20–32)
CREAT SERPL-MCNC: 1.04 MG/DL (ref 0.66–1.25)
DIFFERENTIAL METHOD BLD: NORMAL
EOSINOPHIL # BLD AUTO: 0.1 10E9/L (ref 0–0.7)
EOSINOPHIL NFR BLD AUTO: 1.4 %
ERYTHROCYTE [DISTWIDTH] IN BLOOD BY AUTOMATED COUNT: 12.8 % (ref 10–15)
ETHANOL SERPL-MCNC: 0.31 G/DL
GFR SERPL CREATININE-BSD FRML MDRD: 77 ML/MIN/{1.73_M2}
GLUCOSE SERPL-MCNC: 204 MG/DL (ref 70–99)
HCT VFR BLD AUTO: 46.1 % (ref 40–53)
HGB BLD-MCNC: 16.4 G/DL (ref 13.3–17.7)
IMM GRANULOCYTES # BLD: 0 10E9/L (ref 0–0.4)
IMM GRANULOCYTES NFR BLD: 0.4 %
INR PPP: 1.2 (ref 0.86–1.14)
LABORATORY COMMENT REPORT: NORMAL
LACTATE BLD-SCNC: 4.4 MMOL/L (ref 0.7–2.1)
LIPASE SERPL-CCNC: 168 U/L (ref 73–393)
LYMPHOCYTES # BLD AUTO: 2.2 10E9/L (ref 0.8–5.3)
LYMPHOCYTES NFR BLD AUTO: 23.6 %
MCH RBC QN AUTO: 31.9 PG (ref 26.5–33)
MCHC RBC AUTO-ENTMCNC: 35.6 G/DL (ref 31.5–36.5)
MCV RBC AUTO: 90 FL (ref 78–100)
MONOCYTES # BLD AUTO: 0.8 10E9/L (ref 0–1.3)
MONOCYTES NFR BLD AUTO: 9 %
NEUTROPHILS # BLD AUTO: 6 10E9/L (ref 1.6–8.3)
NEUTROPHILS NFR BLD AUTO: 64.8 %
NRBC # BLD AUTO: 0 10*3/UL
NRBC BLD AUTO-RTO: 0 /100
PCO2 BLDV: 42 MM HG (ref 40–50)
PH BLDV: 7.43 PH (ref 7.32–7.43)
PLATELET # BLD AUTO: 225 10E9/L (ref 150–450)
PO2 BLDV: 55 MM HG (ref 25–47)
POTASSIUM SERPL-SCNC: 3.1 MMOL/L (ref 3.4–5.3)
PROT SERPL-MCNC: 6.6 G/DL (ref 6.8–8.8)
RBC # BLD AUTO: 5.14 10E12/L (ref 4.4–5.9)
SAO2 % BLDV FROM PO2: 89 %
SARS-COV-2 RNA RESP QL NAA+PROBE: NEGATIVE
SODIUM SERPL-SCNC: 137 MMOL/L (ref 133–144)
SPECIMEN EXP DATE BLD: NORMAL
SPECIMEN SOURCE: NORMAL
TROPONIN I SERPL-MCNC: <0.015 UG/L (ref 0–0.04)
WBC # BLD AUTO: 9.3 10E9/L (ref 4–11)

## 2021-03-04 PROCEDURE — 86900 BLOOD TYPING SEROLOGIC ABO: CPT | Performed by: EMERGENCY MEDICINE

## 2021-03-04 PROCEDURE — 85025 COMPLETE CBC W/AUTO DIFF WBC: CPT | Performed by: EMERGENCY MEDICINE

## 2021-03-04 PROCEDURE — C9113 INJ PANTOPRAZOLE SODIUM, VIA: HCPCS | Performed by: EMERGENCY MEDICINE

## 2021-03-04 PROCEDURE — 84484 ASSAY OF TROPONIN QUANT: CPT | Performed by: EMERGENCY MEDICINE

## 2021-03-04 PROCEDURE — 85610 PROTHROMBIN TIME: CPT | Performed by: EMERGENCY MEDICINE

## 2021-03-04 PROCEDURE — 120N000001 HC R&B MED SURG/OB

## 2021-03-04 PROCEDURE — 96366 THER/PROPH/DIAG IV INF ADDON: CPT | Performed by: EMERGENCY MEDICINE

## 2021-03-04 PROCEDURE — 82077 ASSAY SPEC XCP UR&BREATH IA: CPT | Performed by: EMERGENCY MEDICINE

## 2021-03-04 PROCEDURE — 86901 BLOOD TYPING SEROLOGIC RH(D): CPT | Performed by: EMERGENCY MEDICINE

## 2021-03-04 PROCEDURE — 83690 ASSAY OF LIPASE: CPT | Performed by: EMERGENCY MEDICINE

## 2021-03-04 PROCEDURE — 96375 TX/PRO/DX INJ NEW DRUG ADDON: CPT | Performed by: EMERGENCY MEDICINE

## 2021-03-04 PROCEDURE — 250N000009 HC RX 250: Performed by: EMERGENCY MEDICINE

## 2021-03-04 PROCEDURE — 258N000001 HC RX 258: Performed by: EMERGENCY MEDICINE

## 2021-03-04 PROCEDURE — 83605 ASSAY OF LACTIC ACID: CPT

## 2021-03-04 PROCEDURE — 96365 THER/PROPH/DIAG IV INF INIT: CPT | Performed by: EMERGENCY MEDICINE

## 2021-03-04 PROCEDURE — 86850 RBC ANTIBODY SCREEN: CPT | Performed by: EMERGENCY MEDICINE

## 2021-03-04 PROCEDURE — 250N000011 HC RX IP 250 OP 636

## 2021-03-04 PROCEDURE — 93010 ELECTROCARDIOGRAM REPORT: CPT | Performed by: EMERGENCY MEDICINE

## 2021-03-04 PROCEDURE — C9803 HOPD COVID-19 SPEC COLLECT: HCPCS | Performed by: EMERGENCY MEDICINE

## 2021-03-04 PROCEDURE — U0005 INFEC AGEN DETEC AMPLI PROBE: HCPCS | Performed by: EMERGENCY MEDICINE

## 2021-03-04 PROCEDURE — 80053 COMPREHEN METABOLIC PANEL: CPT | Performed by: EMERGENCY MEDICINE

## 2021-03-04 PROCEDURE — U0003 INFECTIOUS AGENT DETECTION BY NUCLEIC ACID (DNA OR RNA); SEVERE ACUTE RESPIRATORY SYNDROME CORONAVIRUS 2 (SARS-COV-2) (CORONAVIRUS DISEASE [COVID-19]), AMPLIFIED PROBE TECHNIQUE, MAKING USE OF HIGH THROUGHPUT TECHNOLOGIES AS DESCRIBED BY CMS-2020-01-R: HCPCS | Performed by: EMERGENCY MEDICINE

## 2021-03-04 PROCEDURE — 99291 CRITICAL CARE FIRST HOUR: CPT | Mod: 25 | Performed by: EMERGENCY MEDICINE

## 2021-03-04 PROCEDURE — 96376 TX/PRO/DX INJ SAME DRUG ADON: CPT | Performed by: EMERGENCY MEDICINE

## 2021-03-04 PROCEDURE — 93005 ELECTROCARDIOGRAM TRACING: CPT | Performed by: EMERGENCY MEDICINE

## 2021-03-04 PROCEDURE — 82803 BLOOD GASES ANY COMBINATION: CPT

## 2021-03-04 PROCEDURE — 82550 ASSAY OF CK (CPK): CPT | Performed by: EMERGENCY MEDICINE

## 2021-03-04 PROCEDURE — 96368 THER/DIAG CONCURRENT INF: CPT | Performed by: EMERGENCY MEDICINE

## 2021-03-04 PROCEDURE — 99285 EMERGENCY DEPT VISIT HI MDM: CPT | Mod: 25 | Performed by: EMERGENCY MEDICINE

## 2021-03-04 PROCEDURE — 250N000011 HC RX IP 250 OP 636: Performed by: EMERGENCY MEDICINE

## 2021-03-04 RX ORDER — LORAZEPAM 2 MG/ML
INJECTION INTRAMUSCULAR
Status: DISCONTINUED
Start: 2021-03-04 | End: 2021-03-04 | Stop reason: WASHOUT

## 2021-03-04 RX ORDER — SODIUM CHLORIDE 9 MG/ML
INJECTION, SOLUTION INTRAVENOUS
Status: DISCONTINUED
Start: 2021-03-04 | End: 2021-03-05 | Stop reason: HOSPADM

## 2021-03-04 RX ORDER — OCTREOTIDE ACETATE 50 UG/ML
50 INJECTION, SOLUTION INTRAVENOUS; SUBCUTANEOUS ONCE
Status: COMPLETED | OUTPATIENT
Start: 2021-03-04 | End: 2021-03-04

## 2021-03-04 RX ORDER — MORPHINE SULFATE 4 MG/ML
4 INJECTION, SOLUTION INTRAMUSCULAR; INTRAVENOUS
Status: COMPLETED | OUTPATIENT
Start: 2021-03-04 | End: 2021-03-04

## 2021-03-04 RX ORDER — POTASSIUM CHLORIDE 7.45 MG/ML
10 INJECTION INTRAVENOUS ONCE
Status: COMPLETED | OUTPATIENT
Start: 2021-03-04 | End: 2021-03-04

## 2021-03-04 RX ORDER — DIAZEPAM 10 MG/2ML
INJECTION, SOLUTION INTRAMUSCULAR; INTRAVENOUS
Status: COMPLETED
Start: 2021-03-04 | End: 2021-03-04

## 2021-03-04 RX ORDER — DIAZEPAM 10 MG/2ML
2.5 INJECTION, SOLUTION INTRAMUSCULAR; INTRAVENOUS ONCE
Status: COMPLETED | OUTPATIENT
Start: 2021-03-04 | End: 2021-03-04

## 2021-03-04 RX ADMIN — POTASSIUM CHLORIDE 10 MEQ: 7.46 INJECTION, SOLUTION INTRAVENOUS at 22:59

## 2021-03-04 RX ADMIN — MORPHINE SULFATE 4 MG: 4 INJECTION, SOLUTION INTRAMUSCULAR; INTRAVENOUS at 23:54

## 2021-03-04 RX ADMIN — PANTOPRAZOLE SODIUM 40 MG: 40 INJECTION, POWDER, FOR SOLUTION INTRAVENOUS at 21:35

## 2021-03-04 RX ADMIN — DIAZEPAM 5 MG: 5 INJECTION, SOLUTION INTRAMUSCULAR; INTRAVENOUS at 22:55

## 2021-03-04 RX ADMIN — DIAZEPAM 2.5 MG: 5 INJECTION, SOLUTION INTRAMUSCULAR; INTRAVENOUS at 23:02

## 2021-03-04 RX ADMIN — OCTREOTIDE ACETATE 50 MCG: 50 INJECTION, SOLUTION INTRAVENOUS; SUBCUTANEOUS at 21:37

## 2021-03-04 ASSESSMENT — ENCOUNTER SYMPTOMS
MYALGIAS: 1
NAUSEA: 1
BLOOD IN STOOL: 1
ABDOMINAL PAIN: 1
VOMITING: 1

## 2021-03-05 ENCOUNTER — HOSPITAL ENCOUNTER (INPATIENT)
Facility: CLINIC | Age: 61
LOS: 1 days | Discharge: LEFT BEFORE DISCHARGE | End: 2021-03-05
Attending: HOSPITALIST | Admitting: HOSPITALIST
Payer: COMMERCIAL

## 2021-03-05 ENCOUNTER — PATIENT OUTREACH (OUTPATIENT)
Dept: CARE COORDINATION | Facility: CLINIC | Age: 61
End: 2021-03-05

## 2021-03-05 ENCOUNTER — APPOINTMENT (OUTPATIENT)
Dept: CT IMAGING | Facility: CLINIC | Age: 61
End: 2021-03-05
Attending: EMERGENCY MEDICINE
Payer: COMMERCIAL

## 2021-03-05 VITALS
DIASTOLIC BLOOD PRESSURE: 97 MMHG | WEIGHT: 208.34 LBS | OXYGEN SATURATION: 98 % | HEART RATE: 88 BPM | TEMPERATURE: 98.1 F | BODY MASS INDEX: 26.75 KG/M2 | SYSTOLIC BLOOD PRESSURE: 153 MMHG | RESPIRATION RATE: 16 BRPM

## 2021-03-05 VITALS
HEART RATE: 78 BPM | DIASTOLIC BLOOD PRESSURE: 109 MMHG | SYSTOLIC BLOOD PRESSURE: 140 MMHG | OXYGEN SATURATION: 98 % | RESPIRATION RATE: 18 BRPM | WEIGHT: 200 LBS | TEMPERATURE: 96.5 F | BODY MASS INDEX: 25.68 KG/M2

## 2021-03-05 PROBLEM — F10.939 ALCOHOL WITHDRAWAL (H): Status: ACTIVE | Noted: 2021-03-05

## 2021-03-05 PROBLEM — G92.9 TOXIC ENCEPHALOPATHY: Status: ACTIVE | Noted: 2021-03-05

## 2021-03-05 LAB
ALBUMIN SERPL-MCNC: 3 G/DL (ref 3.4–5)
ALP SERPL-CCNC: 87 U/L (ref 40–150)
ALT SERPL W P-5'-P-CCNC: 29 U/L (ref 0–70)
ANION GAP SERPL CALCULATED.3IONS-SCNC: 7 MMOL/L (ref 3–14)
AST SERPL W P-5'-P-CCNC: 22 U/L (ref 0–45)
BILIRUB SERPL-MCNC: 1.1 MG/DL (ref 0.2–1.3)
BUN SERPL-MCNC: 11 MG/DL (ref 7–30)
CALCIUM SERPL-MCNC: 7.4 MG/DL (ref 8.5–10.1)
CHLORIDE SERPL-SCNC: 104 MMOL/L (ref 94–109)
CO2 SERPL-SCNC: 27 MMOL/L (ref 20–32)
CREAT SERPL-MCNC: 0.91 MG/DL (ref 0.66–1.25)
ERYTHROCYTE [DISTWIDTH] IN BLOOD BY AUTOMATED COUNT: 13.1 % (ref 10–15)
GFR SERPL CREATININE-BSD FRML MDRD: >90 ML/MIN/{1.73_M2}
GLUCOSE SERPL-MCNC: 178 MG/DL (ref 70–99)
HCT VFR BLD AUTO: 43.2 % (ref 40–53)
HGB BLD-MCNC: 13.7 G/DL (ref 13.3–17.7)
HGB BLD-MCNC: 14.1 G/DL (ref 13.3–17.7)
HGB BLD-MCNC: 14.9 G/DL (ref 13.3–17.7)
LACTATE BLD-SCNC: 2.3 MMOL/L (ref 0.7–2)
LACTATE BLD-SCNC: 4.2 MMOL/L (ref 0.7–2)
MAGNESIUM SERPL-MCNC: 1.9 MG/DL (ref 1.6–2.3)
MCH RBC QN AUTO: 30.9 PG (ref 26.5–33)
MCHC RBC AUTO-ENTMCNC: 34.5 G/DL (ref 31.5–36.5)
MCV RBC AUTO: 90 FL (ref 78–100)
PHOSPHATE SERPL-MCNC: 3.2 MG/DL (ref 2.5–4.5)
PLATELET # BLD AUTO: 207 10E9/L (ref 150–450)
POTASSIUM SERPL-SCNC: 3.3 MMOL/L (ref 3.4–5.3)
POTASSIUM SERPL-SCNC: 3.5 MMOL/L (ref 3.4–5.3)
POTASSIUM SERPL-SCNC: 4 MMOL/L (ref 3.4–5.3)
PROT SERPL-MCNC: 5.8 G/DL (ref 6.8–8.8)
RBC # BLD AUTO: 4.82 10E12/L (ref 4.4–5.9)
SODIUM SERPL-SCNC: 138 MMOL/L (ref 133–144)
WBC # BLD AUTO: 10.6 10E9/L (ref 4–11)

## 2021-03-05 PROCEDURE — 96376 TX/PRO/DX INJ SAME DRUG ADON: CPT | Performed by: EMERGENCY MEDICINE

## 2021-03-05 PROCEDURE — 71260 CT THORAX DX C+: CPT

## 2021-03-05 PROCEDURE — 80053 COMPREHEN METABOLIC PANEL: CPT | Performed by: HOSPITALIST

## 2021-03-05 PROCEDURE — 85018 HEMOGLOBIN: CPT | Performed by: HOSPITALIST

## 2021-03-05 PROCEDURE — 36415 COLL VENOUS BLD VENIPUNCTURE: CPT | Performed by: HOSPITALIST

## 2021-03-05 PROCEDURE — 99207 PR CDG-CODE CATEGORY CHANGED: CPT | Performed by: HOSPITALIST

## 2021-03-05 PROCEDURE — 250N000013 HC RX MED GY IP 250 OP 250 PS 637: Performed by: HOSPITALIST

## 2021-03-05 PROCEDURE — 250N000011 HC RX IP 250 OP 636: Performed by: EMERGENCY MEDICINE

## 2021-03-05 PROCEDURE — 96361 HYDRATE IV INFUSION ADD-ON: CPT | Performed by: EMERGENCY MEDICINE

## 2021-03-05 PROCEDURE — G0378 HOSPITAL OBSERVATION PER HR: HCPCS

## 2021-03-05 PROCEDURE — 250N000011 HC RX IP 250 OP 636: Performed by: HOSPITALIST

## 2021-03-05 PROCEDURE — 258N000003 HC RX IP 258 OP 636: Performed by: EMERGENCY MEDICINE

## 2021-03-05 PROCEDURE — 84100 ASSAY OF PHOSPHORUS: CPT | Performed by: HOSPITALIST

## 2021-03-05 PROCEDURE — 83605 ASSAY OF LACTIC ACID: CPT | Performed by: HOSPITALIST

## 2021-03-05 PROCEDURE — 84132 ASSAY OF SERUM POTASSIUM: CPT | Performed by: PHYSICIAN ASSISTANT

## 2021-03-05 PROCEDURE — 36415 COLL VENOUS BLD VENIPUNCTURE: CPT | Performed by: PHYSICIAN ASSISTANT

## 2021-03-05 PROCEDURE — C9113 INJ PANTOPRAZOLE SODIUM, VIA: HCPCS | Performed by: HOSPITALIST

## 2021-03-05 PROCEDURE — 99223 1ST HOSP IP/OBS HIGH 75: CPT | Mod: AI | Performed by: HOSPITALIST

## 2021-03-05 PROCEDURE — 84132 ASSAY OF SERUM POTASSIUM: CPT | Performed by: HOSPITALIST

## 2021-03-05 PROCEDURE — HZ2ZZZZ DETOXIFICATION SERVICES FOR SUBSTANCE ABUSE TREATMENT: ICD-10-PCS | Performed by: HOSPITALIST

## 2021-03-05 PROCEDURE — 120N000001 HC R&B MED SURG/OB

## 2021-03-05 PROCEDURE — 85018 HEMOGLOBIN: CPT | Performed by: PHYSICIAN ASSISTANT

## 2021-03-05 PROCEDURE — 83735 ASSAY OF MAGNESIUM: CPT | Performed by: HOSPITALIST

## 2021-03-05 PROCEDURE — 85027 COMPLETE CBC AUTOMATED: CPT | Performed by: HOSPITALIST

## 2021-03-05 RX ORDER — SODIUM CHLORIDE AND POTASSIUM CHLORIDE 150; 900 MG/100ML; MG/100ML
INJECTION, SOLUTION INTRAVENOUS CONTINUOUS
Status: DISCONTINUED | OUTPATIENT
Start: 2021-03-05 | End: 2021-03-05 | Stop reason: HOSPADM

## 2021-03-05 RX ORDER — NICOTINE 21 MG/24HR
1 PATCH, TRANSDERMAL 24 HOURS TRANSDERMAL DAILY
Status: DISCONTINUED | OUTPATIENT
Start: 2021-03-05 | End: 2021-03-05 | Stop reason: HOSPADM

## 2021-03-05 RX ORDER — OLANZAPINE 5 MG/1
5-10 TABLET, ORALLY DISINTEGRATING ORAL EVERY 6 HOURS PRN
Status: DISCONTINUED | OUTPATIENT
Start: 2021-03-05 | End: 2021-03-05 | Stop reason: HOSPADM

## 2021-03-05 RX ORDER — POTASSIUM CHLORIDE 7.45 MG/ML
10 INJECTION INTRAVENOUS
Status: COMPLETED | OUTPATIENT
Start: 2021-03-05 | End: 2021-03-05

## 2021-03-05 RX ORDER — DIAZEPAM 10 MG/2ML
2.5 INJECTION, SOLUTION INTRAMUSCULAR; INTRAVENOUS ONCE
Status: COMPLETED | OUTPATIENT
Start: 2021-03-05 | End: 2021-03-05

## 2021-03-05 RX ORDER — LANOLIN ALCOHOL/MO/W.PET/CERES
200 CREAM (GRAM) TOPICAL 3 TIMES DAILY
Status: DISCONTINUED | OUTPATIENT
Start: 2021-03-05 | End: 2021-03-05 | Stop reason: HOSPADM

## 2021-03-05 RX ORDER — ACETAMINOPHEN 650 MG/1
650 SUPPOSITORY RECTAL EVERY 4 HOURS PRN
Status: DISCONTINUED | OUTPATIENT
Start: 2021-03-05 | End: 2021-03-05 | Stop reason: HOSPADM

## 2021-03-05 RX ORDER — MAGNESIUM SULFATE HEPTAHYDRATE 40 MG/ML
2 INJECTION, SOLUTION INTRAVENOUS ONCE
Status: COMPLETED | OUTPATIENT
Start: 2021-03-05 | End: 2021-03-05

## 2021-03-05 RX ORDER — AMOXICILLIN 250 MG
2 CAPSULE ORAL 2 TIMES DAILY PRN
Status: DISCONTINUED | OUTPATIENT
Start: 2021-03-05 | End: 2021-03-05 | Stop reason: HOSPADM

## 2021-03-05 RX ORDER — FLUMAZENIL 0.1 MG/ML
0.2 INJECTION, SOLUTION INTRAVENOUS
Status: DISCONTINUED | OUTPATIENT
Start: 2021-03-05 | End: 2021-03-05 | Stop reason: HOSPADM

## 2021-03-05 RX ORDER — BISACODYL 10 MG
10 SUPPOSITORY, RECTAL RECTAL DAILY PRN
Status: DISCONTINUED | OUTPATIENT
Start: 2021-03-05 | End: 2021-03-05 | Stop reason: HOSPADM

## 2021-03-05 RX ORDER — SODIUM CHLORIDE 9 MG/ML
INJECTION, SOLUTION INTRAVENOUS CONTINUOUS
Status: DISCONTINUED | OUTPATIENT
Start: 2021-03-05 | End: 2021-03-05

## 2021-03-05 RX ORDER — PROCHLORPERAZINE MALEATE 5 MG
10 TABLET ORAL EVERY 6 HOURS PRN
Status: DISCONTINUED | OUTPATIENT
Start: 2021-03-05 | End: 2021-03-05 | Stop reason: HOSPADM

## 2021-03-05 RX ORDER — FOLIC ACID 1 MG/1
1 TABLET ORAL DAILY
Status: DISCONTINUED | OUTPATIENT
Start: 2021-03-05 | End: 2021-03-05 | Stop reason: HOSPADM

## 2021-03-05 RX ORDER — MULTIPLE VITAMINS W/ MINERALS TAB 9MG-400MCG
1 TAB ORAL DAILY
Status: DISCONTINUED | OUTPATIENT
Start: 2021-03-05 | End: 2021-03-05 | Stop reason: HOSPADM

## 2021-03-05 RX ORDER — ONDANSETRON 2 MG/ML
4 INJECTION INTRAMUSCULAR; INTRAVENOUS EVERY 6 HOURS PRN
Status: DISCONTINUED | OUTPATIENT
Start: 2021-03-05 | End: 2021-03-05 | Stop reason: HOSPADM

## 2021-03-05 RX ORDER — LANOLIN ALCOHOL/MO/W.PET/CERES
3 CREAM (GRAM) TOPICAL
Status: DISCONTINUED | OUTPATIENT
Start: 2021-03-05 | End: 2021-03-05 | Stop reason: HOSPADM

## 2021-03-05 RX ORDER — IOPAMIDOL 755 MG/ML
100 INJECTION, SOLUTION INTRAVASCULAR ONCE
Status: COMPLETED | OUTPATIENT
Start: 2021-03-05 | End: 2021-03-05

## 2021-03-05 RX ORDER — HALOPERIDOL 5 MG/ML
2.5-5 INJECTION INTRAMUSCULAR EVERY 6 HOURS PRN
Status: DISCONTINUED | OUTPATIENT
Start: 2021-03-05 | End: 2021-03-05 | Stop reason: HOSPADM

## 2021-03-05 RX ORDER — DIAZEPAM 5 MG
5-20 TABLET ORAL EVERY 30 MIN PRN
Status: DISCONTINUED | OUTPATIENT
Start: 2021-03-05 | End: 2021-03-05 | Stop reason: HOSPADM

## 2021-03-05 RX ORDER — POLYETHYLENE GLYCOL 3350 17 G/17G
17 POWDER, FOR SOLUTION ORAL DAILY PRN
Status: DISCONTINUED | OUTPATIENT
Start: 2021-03-05 | End: 2021-03-05 | Stop reason: HOSPADM

## 2021-03-05 RX ORDER — ACETAMINOPHEN 325 MG/1
650 TABLET ORAL EVERY 4 HOURS PRN
Status: DISCONTINUED | OUTPATIENT
Start: 2021-03-05 | End: 2021-03-05 | Stop reason: HOSPADM

## 2021-03-05 RX ORDER — PROCHLORPERAZINE 25 MG
25 SUPPOSITORY, RECTAL RECTAL EVERY 12 HOURS PRN
Status: DISCONTINUED | OUTPATIENT
Start: 2021-03-05 | End: 2021-03-05 | Stop reason: HOSPADM

## 2021-03-05 RX ORDER — SODIUM CHLORIDE 9 MG/ML
100 INJECTION, SOLUTION INTRAVENOUS ONCE
Status: COMPLETED | OUTPATIENT
Start: 2021-03-05 | End: 2021-03-05

## 2021-03-05 RX ORDER — DIAZEPAM 5 MG
10 TABLET ORAL EVERY 30 MIN PRN
Status: DISCONTINUED | OUTPATIENT
Start: 2021-03-05 | End: 2021-03-05 | Stop reason: HOSPADM

## 2021-03-05 RX ORDER — ONDANSETRON 4 MG/1
4 TABLET, ORALLY DISINTEGRATING ORAL EVERY 6 HOURS PRN
Status: DISCONTINUED | OUTPATIENT
Start: 2021-03-05 | End: 2021-03-05 | Stop reason: HOSPADM

## 2021-03-05 RX ORDER — MORPHINE SULFATE 4 MG/ML
4 INJECTION, SOLUTION INTRAMUSCULAR; INTRAVENOUS
Status: COMPLETED | OUTPATIENT
Start: 2021-03-05 | End: 2021-03-05

## 2021-03-05 RX ORDER — LANOLIN ALCOHOL/MO/W.PET/CERES
100 CREAM (GRAM) TOPICAL 3 TIMES DAILY
Status: DISCONTINUED | OUTPATIENT
Start: 2021-03-07 | End: 2021-03-05 | Stop reason: HOSPADM

## 2021-03-05 RX ORDER — DIAZEPAM 10 MG/2ML
5-10 INJECTION, SOLUTION INTRAMUSCULAR; INTRAVENOUS EVERY 30 MIN PRN
Status: DISCONTINUED | OUTPATIENT
Start: 2021-03-05 | End: 2021-03-05 | Stop reason: HOSPADM

## 2021-03-05 RX ORDER — LANOLIN ALCOHOL/MO/W.PET/CERES
100 CREAM (GRAM) TOPICAL DAILY
Status: DISCONTINUED | OUTPATIENT
Start: 2021-03-12 | End: 2021-03-05 | Stop reason: HOSPADM

## 2021-03-05 RX ORDER — AMOXICILLIN 250 MG
1 CAPSULE ORAL 2 TIMES DAILY PRN
Status: DISCONTINUED | OUTPATIENT
Start: 2021-03-05 | End: 2021-03-05 | Stop reason: HOSPADM

## 2021-03-05 RX ORDER — LIDOCAINE 40 MG/G
CREAM TOPICAL
Status: DISCONTINUED | OUTPATIENT
Start: 2021-03-05 | End: 2021-03-05 | Stop reason: HOSPADM

## 2021-03-05 RX ORDER — LORAZEPAM 2 MG/ML
2 INJECTION INTRAMUSCULAR
Status: DISCONTINUED | OUTPATIENT
Start: 2021-03-05 | End: 2021-03-05 | Stop reason: HOSPADM

## 2021-03-05 RX ADMIN — IOPAMIDOL 98 ML: 755 INJECTION, SOLUTION INTRAVENOUS at 00:42

## 2021-03-05 RX ADMIN — SODIUM CHLORIDE 65 ML: 9 INJECTION, SOLUTION INTRAVENOUS at 00:43

## 2021-03-05 RX ADMIN — POTASSIUM CHLORIDE 10 MEQ: 7.46 INJECTION, SOLUTION INTRAVENOUS at 09:27

## 2021-03-05 RX ADMIN — MAGNESIUM SULFATE HEPTAHYDRATE 2 G: 40 INJECTION, SOLUTION INTRAVENOUS at 12:48

## 2021-03-05 RX ADMIN — POTASSIUM CHLORIDE 10 MEQ: 7.46 INJECTION, SOLUTION INTRAVENOUS at 07:33

## 2021-03-05 RX ADMIN — POTASSIUM CHLORIDE 10 MEQ: 7.46 INJECTION, SOLUTION INTRAVENOUS at 06:05

## 2021-03-05 RX ADMIN — PROCHLORPERAZINE EDISYLATE 10 MG: 5 INJECTION INTRAMUSCULAR; INTRAVENOUS at 09:27

## 2021-03-05 RX ADMIN — DIAZEPAM 2.5 MG: 5 INJECTION, SOLUTION INTRAMUSCULAR; INTRAVENOUS at 00:20

## 2021-03-05 RX ADMIN — POTASSIUM CHLORIDE 10 MEQ: 7.46 INJECTION, SOLUTION INTRAVENOUS at 11:42

## 2021-03-05 RX ADMIN — POTASSIUM CHLORIDE AND SODIUM CHLORIDE: 900; 150 INJECTION, SOLUTION INTRAVENOUS at 04:15

## 2021-03-05 RX ADMIN — MORPHINE SULFATE 4 MG: 4 INJECTION, SOLUTION INTRAMUSCULAR; INTRAVENOUS at 01:22

## 2021-03-05 RX ADMIN — ONDANSETRON 4 MG: 2 INJECTION INTRAMUSCULAR; INTRAVENOUS at 06:07

## 2021-03-05 RX ADMIN — THIAMINE HCL TAB 100 MG 200 MG: 100 TAB at 15:48

## 2021-03-05 RX ADMIN — PANTOPRAZOLE SODIUM 40 MG: 40 INJECTION, POWDER, FOR SOLUTION INTRAVENOUS at 09:27

## 2021-03-05 RX ADMIN — DIAZEPAM 5 MG: 5 INJECTION, SOLUTION INTRAMUSCULAR; INTRAVENOUS at 09:26

## 2021-03-05 RX ADMIN — NICOTINE 1 PATCH: 21 PATCH, EXTENDED RELEASE TRANSDERMAL at 16:20

## 2021-03-05 RX ADMIN — POTASSIUM CHLORIDE AND SODIUM CHLORIDE: 900; 150 INJECTION, SOLUTION INTRAVENOUS at 15:48

## 2021-03-05 ASSESSMENT — ACTIVITIES OF DAILY LIVING (ADL): ADLS_ACUITY_SCORE: 16

## 2021-03-05 NOTE — ED PROVIDER NOTES
[facility] EMERGENCY DEPARTMENT ([facility])    3/04/21     ED 9 8:58 PM   History     Chief Complaint   Patient presents with     Addiction Problem     ETOH, last drink today, would like to go to detox     The history is provided by the patient and medical records.     Saroj Summers is a 60 year old male with history of depression, anxiety, PTSD, laparoscopic appendectomy who presents with abdominal pain, nausea, vomiting, and hematemesis. Patient states he had 26 years of sobriety, but started drinking again more heavily about a week ago.  He states that he is drinking 2L of hard liquor a day. Patient tried to detox himself but thinks he made a mistake in doing this, as he started experiencing alcohol withdrawal seizures last night.  He started gagging and vomiting blood 3 days ago. He states that he has bright red bloody stools as well for the past 4 days.  He also has diffuse body aches that started 4-5 days ago, states that he has never had pain like this before.  He states that he asked family to take him to the hospital.  No history of GI bleed.  He is not on anticoagulation. He has had some coughing with vomiting but otherwise denies cough.  Denies fever.     I have reviewed the Medications, Allergies, Past Medical and Surgical History, and Social History in the Epic system.  No past medical history on file.    Past Surgical History:   Procedure Laterality Date     LAPAROSCOPIC APPENDECTOMY N/A 7/13/2020    Procedure: Laparoscopic Appendectomy;  Surgeon: Valeriano Vega DO;  Location: [facility] OR       Family History   Problem Relation Age of Onset     Cancer - colorectal Father      Alcohol/Drug Father      Cancer Paternal Grandmother      C.A.D. Paternal Grandfather      Neurologic Disorder Son         brain trauma at birth       Social History     Tobacco Use     Smoking status: Former Smoker     Packs/day: 15.00     Years: 0.50     Pack years: 7.50     Quit date: 2009     Years since  quittin.1     Smokeless tobacco: Current User     Types: Snuff   Substance Use Topics     Alcohol use: Not Currently     Comment: sober 2012     No current facility-administered medications for this encounter.      No current outpatient medications on file.        Allergies   Allergen Reactions     Nka [No Known Allergies]         Review of Systems   Gastrointestinal: Positive for abdominal pain, blood in stool, nausea and vomiting.   Musculoskeletal: Positive for myalgias.   All other systems reviewed and are negative.      Physical Exam   BP: (!) 133/93  Pulse: 124  Temp: 96.5  F (35.8  C)  Resp: 18  SpO2: 95 %      Physical Exam  General: patient is alert and oriented, tearful  Head: atraumatic and normocephalic   EENT: Dry mucus membranes with dried blood around the lips, pupils round and reactive, injection of the conjunctiva  Neck: supple full range of motion, no meningismus  Cardiovascular: regular rate and rhythm, no murmur appreciated, extremities warm and well perfused, no lower extremity edema  Pulmonary: lungs clear to auscultation bilaterally   Abdomen: soft, diffusely tender to palpation, voluntary guarding   musculoskeletal: normal range of motion   Neurological: alert and oriented, moving all extremities symmetrically, gait normal, no facial droop, speech is slightly slurred  Skin: warm, dry     ED Course        Procedures             EKG Interpretation:      Interpreted by Verena Briggs MD  Time reviewed:   Symptoms at time of EKG: AMS   Rhythm: sinus tachycardia  Rate: Tachycardia  Axis: Normal  Ectopy: none  Conduction: normal  ST Segments/ T Waves: Non-specific ST-T wave changes  Q Waves: none  Comparison to prior: No old EKG available    Clinical Impression: non-specific EKG                   Critical Care Addendum    My initial assessment, based on my review of nursing observations, review of vital signs, focused history, physical exam, review of cardiac rhythm monitor and 12  lead ECG analysis, established that Saroj Summers has altered mental status and and seizure like activity, which requires immediate intervention, and therefore he is critically ill.     After the initial assessment, the care team initiated multiple lab tests, initiated IV fluid administration and initiated medication therapy with IV diazepam, protonix, octreotide, potassium, saline to provide stabilization care. Due to the critical nature of this patient, I reassessed nursing observations, vital signs, physical exam, review of cardiac rhythm monitor, mental status and neurologic status multiple times prior to his disposition.     Time also spent performing documentation, reviewing test results and coordination of care.     Critical care time (excluding teaching time and procedures): 30 minutes.        The Lactic acid level is elevated due to alcohol intoxication, at this time there is no sign of severe sepsis or septic shock.       Labs Ordered and Resulted from Time of ED Arrival Up to the Time of Departure from the ED - No data to display         Assessments & Plan (with Medical Decision Making)   Mr. Summers is a 60 year old male with history of depression, anxiety, PTSD, laparoscopic appendectomy who presents with abdominal pain, nausea, vomiting, and hematemesis.  He is hemodynamically stable and afebrile.  He does have dried blood around his mouth and has noted both hematemesis and blood in his stools.  At this point his hemoglobin is normal at 16.4.  His INR is slightly elevated at 1.2.  LFTs are within normal limits.  He does have some abdominal distention and is unclear if he may have early cirrhosis secondary to alcohol use.  He was given both Protonix and octreotide for GI bleed.  The remainder of his electrolytes are notable for a potassium of 3.1 which was supplemented.  Her alcohol level is elevated at 0.31.  During his ED stay he did have multiple episodes of diffuse seizure-like activity in his  upper and lower extremities.  He did desaturate down to the mid 70s.  He was given a total of 10 mg IV diazepam.  It is unclear if he is truly having withdrawal seizures as he does remain alert during these episodes and is still able to follow commands.  He does not have any elevation in CK.  Lactate is elevated at 4 which I suspect is secondary to his alcohol use.  CT was obtained and pending at signout.  Patient will be admitted to [facility] for further care.      I have reviewed the nursing notes.    I have reviewed the findings, diagnosis, plan and need for follow up with the patient.    New Prescriptions    No medications on file       Final diagnoses:   Alcohol withdrawal syndrome without complication (H)   Gastrointestinal hemorrhage, unspecified gastrointestinal hemorrhage type   Hypokalemia   Drug withdrawal seizure without complication (H)     I, Elva Harris, am serving as a trained medical scribe to document services personally performed by Verena Hatfield MD based on the provider's statements to me on March 4, 2021.  This document has been checked and approved by the attending provider.    I, Verena Hatfield MD, was physically present and have reviewed and verified the accuracy of this note documented by Elva Harris medical scribe.     3/4/2021   [facility] EMERGENCY DEPARTMENT      Verena Hatfield MD  03/05/21 0123

## 2021-03-05 NOTE — PROGRESS NOTES
[facility]    Medicine Progress Note - Hospitalist Service       Date of Admission:  3/5/2021  Assessment & Plan       Saroj Summers is a 60 year old gentleman with past medical history that is most notable for depression and anxiety and PTSD, who presents with abdominal pain, hematochezia, and hematemesis while intoxicated and found to have acute toxic encephalopathy to to alcohol intoxication.    Acute toxic encephalopathy to to alcohol intoxication  Alcohol withdrawal  Alcohol use disorder  There is report that he had been sober for 26 years prior to this episode of binge drinking. He remains intoxicated and also delirious and somnolent after administration of Valium and Morphine in the ED.    Registered to observation, may be appropriate for inpatient status as he now has active withdrawal symptoms requiring PRN valium, GI planning EGD/colonoscopy as noted below, will likely be in the hospital for at least 2-3 more nights.    Clear liquid diet.    CIWA protocol with PRN Valium as indicated.    IV fluids.    Continue thiamine, folic acid, multivitamin.    CD consult once withdrawal symptoms improved.     Hematemesis and hematochezia  By report. None witnessed here thus far though dried blood was noted on arrival to the ED. He does not appear to have known prior history of GI bleeding.    GI consulted.    Continue IV PPI BID.    Monitor hemoglobin every 8 hours, next check at 1400 today.    Clear liquid diet.    GI planning EGD/colonoscopy once withdrawal symptoms improved.     Spells  These seem less likely to have been true seizures as per ED report to me. Monitor very closely for development of seizures due to alcohol withdrawal.    Seizure precautions.    Has had some tremors but no seizure activity today per discussion with nursing.     Acute lactic metabolic acidosis  Likely alcoholic ketooacidosis.    Repeat Lactate trended down slightly with IV fluids.    Recheck lactate with  next lab draw.     Hypokalemia    Resolved with replacement.    Magnesium within normal limits.     Adrenal mass  Noted on admission 7/2020 for appendicitis during which admission he underwent appendectomy. Follow up outpatient MRI in the same month revealed 2.3 cm right adrenal gland nodule of indeterminate etiology.    Adrenal nodule reported as stable compared to CT scan from September.    Follow-up with outpatient provider for ongoing monitoring.    COVID-19 PCR NEGATIVE    Asymptomatic COVID-19 PCR testing was NEGATIVE on 3/4/21.     Diet: Clear Liquid Diet    DVT Prophylaxis: Pneumatic Compression Devices  Fabian Catheter: not present  Code Status: Full Code           Disposition Plan   Expected discharge: will likely be in the hospital for at least 2-3 nights  Entered: Sean Correa MD 03/05/2021, 11:16 AM       The patient's care was discussed with the Bedside Nurse and Patient.    Sean Correa MD  Hospitalist Service  [facility]  Contact information available via [facility] Paging/Directory    ______________________________________________________________________    Interval History   Saroj Summers feels tired today. Continues to have some abdominal pain. No nausea/vomiting or bowel movements. Denies fevers, chest pain, shortness of breath.    Data reviewed today: I reviewed all medications, new labs and imaging results over the last 24 hours. I personally reviewed no images or EKG's today.    Physical Exam   Vital Signs: Temp: 97.8  F (36.6  C) Temp src: Axillary BP: (!) 141/91 Pulse: 77   Resp: 18 SpO2: 100 % O2 Device: None (Room air)    Weight: 208 lbs 5.36 oz  Constitutional: awake, drowsy, cooperative, no apparent distress, laying in bed  Respiratory: clear to auscultation bilaterally, no crackles or wheezing  Cardiovascular: regular rate and rhythm, normal S1 and S2, no murmur noted  GI: normal bowel sounds, soft, non-distended, mild lower and epigastric  tenderness  Skin: warm, dry  Musculoskeletal: no lower extremity pitting edema present  Neurologic: awake, drowsy but awakens to answer some questions but then drifts back to sleep, answered some questions OK but though the year was 2020, month was February, city was [place] and [place] was president.    Data   Recent Labs   Lab 03/05/21  0555 03/05/21  0353 03/04/21  2125   WBC  --  10.6 9.3   HGB 14.1 14.9 16.4   MCV  --  90 90   PLT  --  207 225   INR  --   --  1.20*     --  137   POTASSIUM 3.5 3.3* 3.1*   CHLORIDE 104  --  100   CO2 27  --  28   BUN 11  --  14   CR 0.91  --  1.04   ANIONGAP 7  --  9   JOHN 7.4*  --  8.2*   *  --  204*   ALBUMIN 3.0*  --  3.5   PROTTOTAL 5.8*  --  6.6*   BILITOTAL 1.1  --  0.6   ALKPHOS 87  --  110   ALT 29  --  34   AST 22  --  27   LIPASE  --   --  168   TROPI  --   --  <0.015     Medications     0.9% sodium chloride + KCl 20 mEq/L 150 mL/hr at 03/05/21 0415       folic acid  1 mg Oral Daily     magnesium sulfate  2 g Intravenous Once     multivitamin w/minerals  1 tablet Oral Daily     pantoprazole (PROTONIX) IV  40 mg Intravenous BID     sodium chloride (PF)  3 mL Intracatheter Q8H     thiamine  200 mg Oral TID    Followed by     [START ON 3/7/2021] thiamine  100 mg Oral TID    Followed by     [START ON 3/12/2021] thiamine  100 mg Oral Daily

## 2021-03-05 NOTE — ED NOTES
Rn in room to give pt medication when pt appeared to start seizing. MD called in room and 7.5 mg of Valium given total. Seizure pads on bed.

## 2021-03-05 NOTE — PLAN OF CARE
DATE & TIME: 3/5/21 3246-4404    Cognitive Concerns/ Orientation : A & O x 2 (self and place)   BEHAVIOR & AGGRESSION TOOL COLOR: Green  CIWA SCORE: 6   ABNL VS/O2: /92, otherwise VSS on RA  MOBILITY: A x 1 GB  PAIN MANAGMENT: No nonverbal indicators of pain  DIET: NPO  BOWEL/BLADDER: Continent of B & B  ABNL LAB/BG: Lactic acid 4.2. Ethanol 0.31.  K+ 3.3, replacement infusing.  DRAIN/DEVICES: L PIV infusing NS 20 KCl at 150/h  TELEMETRY RHYTHM: NA  SKIN: Intact, scattered bruising  TESTS/PROCEDURES: Chest CT completed, see chart for results.  D/C DAY/GOALS/PLACE: TBD  OTHER IMPORTANT INFO: Unable to perform full neuro assessment as pt was sedated. Zofran given x 1.

## 2021-03-05 NOTE — H&P
[facility]    History and Physical  Hospitalist       Date of Admission:  3/5/2021  Date of Service (when I saw the patient): 03/05/21    ASSESSMENT  Saroj Summers is a 60 year old gentleman with past medical history that is most notable for depression and anxiety and PTSD, who presents with abdominal pain, hematochezia, and hematemesis while intoxicated and found to have acute toxic encephalopathy to to alcohol intoxication.    PLAN    Acute toxic encephalopathy to to alcohol intoxication: There is report that he had been sober for 26 years prior to this episode of binge drinking. Currently he remains intoxicated and also delirious and somnolent after administration of Valium and Morphine in the ED. There have been as yet no clear signs of ETOH withdrawal but he could be at high risk for that.    -- Observation. NPO. CIWA with Valium ordered. IV fluids NS with KCl 150 ml/hour ordered. Thiamine, Folate, MVI ordered.     -- Consider CD consultation once sobriety is reached    Hematemesis and hematochezia: By report. None witnessed here thus far though dried blood was noted on arrival to the ED. He does not appear to have known prior history of GI bleeding.    -- GI consulted. IV PPI BID ordered. Serial HGB checks, noting he is unable to provide consent for blood at this time; would transfuse overnight if needed on an emergent basis.    Spells: These seem less likely to have been true seizures as per ED report to me. We must monitor very closely for development of seizures due to alcohol withdrawal.    -- Seizure precautions ordered. If true seizure activity manifests would consider EEG and consultation with Neurology    Acute lactic metabolic acidosis: Likely alcoholic ketooacidosis; repeat Lactate pending after IV fluid resuscitation given    Hypokalemia: Replacing. Check Magnesium and replace as well.    Adrenal mass: Noted on admission 7/2020 for appendicitis during which admission  he underwent appendectomy. Follow up outpatient MRI in the same month revealed 2.3 cm right adrenal gland nodule of indeterminate etiology.    -- Further outpatient follow up will be warranted at discharge    Rule Out COVID-19 infection  This patient was evaluated during a global COVID-19 pandemic, which necessitated consideration that the patient might be at risk for infection with the SARS-CoV-2 virus that causes COVID-19. Applicable protocols for evaluation were followed during the patient's care. Low suspicion for infection.   -negative COVID-19 PCR test result  -no current indication for precautions    Chief Complaint   Abdominal pain    Unable to obtain a history from the patient due to confusion; history obtained from the ED physician whom I have spoken with    History of Present Illness   Saroj Summers is a 60 year old gentleman who presents with abdominal pain; he is currently somnolent and unable to provide history, which is obtained from his ED provider, who relates to me that he presented to the [place] ED tonight with abdominal pain that is sharp and severe, epigastric, radiating into the upper abdomen, associated with nausea and vomiting including hematemesis, as well as hematochezia in the stools, all for the past several days while drinking alcohol heavily (1-2 liters a day was reported for over the past two weeks). He also noted that he had tried to start detoxing himself at home but after cutting down he noticed intermittent episodes of shaking for the past night and day today which he attributed as seizures. He denied fever to the ED provider. He was noted to be intoxicated at the time of this interview.    In the ED, T 96.5, pulse 124, /105, sats 92% on RA.    Ethanol level initially was 0.31. There was dried blood noted around his mouth on arrival.    CBC and CMP were notable for K 3.1, Ca 8.2, Glucose 204, Tprot 6.6, otherwise were within the normal reference range. (HGB 16).  "Lipase 168 and . Lactate was elevated at 4.4. VBG showed 7.43/42. INR elevated at 1.20. COVID negative. Troponin negative.    CT Chest, abdomen and pelvis showed: \"IMPRESSION:  1.  No acute abnormality. No bowel obstruction or inflammation.  2.  Old granulomatous disease. No acute chest abnormality.  3.  Fatty infiltration of the liver.\"    While in the ED, he was noted to develop intermittent full body convulsions without loss of consciousness; Valium 10 mg was given, as well as IV Morphine, which resolved these but has now left him very lethargic and able only to wake up for me, complain of ongoing pain, ask for pain relief, and the fall back asleep.    He was also given a dose of IV Protonix and a dose of IV Octreotide in the ED, as well as a banana bag and KCl, prior to transfer here.    PHYSICAL EXAM  Blood pressure (!) 137/92, pulse 87, temperature 97.5  F (36.4  C), temperature source Oral, resp. rate 18, weight 94.5 kg (208 lb 5.4 oz), SpO2 92 %.  Constitutional: somnolent but arousable; no apparent distress  HEENT: normocephalic moist mucus membranes  Respiratory: lungs clear to auscultation bilaterally  Cardiovascular: regular S1 S2   GI: abdomen soft non tender non distended bowel sounds positive  Lymph/Hematologic: no pallor, no cervical lymphadenopathy  Skin: no rash, good turgor  Musculoskeletal: no clubbing, cyanosis or edema  Neurologic: extra-ocular muscles intact; moves all four extremities  Psychiatric: GCS 12, limited insight and judgment at present     DVT Prophylaxis: Pneumatic Compression Devices  Code Status: Full Code presumed    Disposition: Expected discharge in 0-2 days once sobriety is reached and provided he develops neither overt GI bleeding nor ETOH withdrawal    Dimitrios Yoder MD    Past Medical History    I have reviewed this patient's medical history and updated it with pertinent information if needed.   Past Medical History:   Diagnosis Date     Anxiety      " Depression      Diverticulosis of sigmoid colon      PTSD (post-traumatic stress disorder)        Past Surgical History   I have reviewed this patient's surgical history and updated it with pertinent information if needed.  Past Surgical History:   Procedure Laterality Date     LAPAROSCOPIC APPENDECTOMY N/A 7/13/2020    Procedure: Laparoscopic Appendectomy;  Surgeon: Valeriano Vega DO;  Location: [facility] OR       Prior to Admission Medications   None     Allergies   Allergies   Allergen Reactions     Nka [No Known Allergies]        Social History   I have reviewed this patient's social history and updated it with pertinent information if needed. Saroj Summers  reports that he quit smoking about 12 years ago. He has a 7.50 pack-year smoking history. His smokeless tobacco use includes snuff. He reports previous alcohol use. He reports that he does not use drugs.    Family History   Family history assessed and, except as above, is non-contributory.    Family History   Problem Relation Age of Onset     Cancer - colorectal Father      Alcohol/Drug Father      Cancer Paternal Grandmother      C.A.D. Paternal Grandfather      Neurologic Disorder Son         brain trauma at birth       Review of Systems   The 10 point Review of Systems is negative other than noted in the HPI or here.     Primary Care Physician   [facility]    Data   Labs Ordered and Resulted from Time of ED Arrival Up to the Time of Departure from the ED - No data to display    Data reviewed today:  I personally reviewed the abdominal CT image(s) showing steatohepatosis.    Recent Results (from the past 24 hour(s))   CT Chest/Abdomen/Pelvis w Contrast    Narrative    EXAM: CT CHEST/ABDOMEN/PELVIS W CONTRAST  LOCATION: [facility]  DATE/TIME: 3/5/2021 12:02 AM    INDICATION: Abdominal pain. Hematemesis. Hemoptysis. Cough.  COMPARISON: 09/05/2020.  TECHNIQUE: CT scan of the chest, abdomen, and pelvis was performed  following injection of IV contrast. Multiplanar reformats were obtained. Dose reduction techniques were used.   CONTRAST: 98 mL Isovue 370    FINDINGS:   LUNGS AND PLEURA: Calcified granuloma in the right upper lobe laterally. Dependent atelectasis bilaterally. Band of scar or atelectasis in the inferior lingula. No pneumothorax or pleural effusion.    MEDIASTINUM/AXILLAE: Calcified lymph nodes in the mediastinum and right hilum. No lymph node enlargement. The heart size is normal. Central airways are unremarkable.    CORONARY ARTERY CALCIFICATION: None.    HEPATOBILIARY: Fatty infiltration of the liver. The gallbladder is distended but otherwise appears normal.    PANCREAS: Normal.    SPLEEN: Few small calcified granulomas.    ADRENAL GLANDS: 2.2 cm right adrenal gland nodule without significant interval change.    KIDNEYS/BLADDER: 2.8 cm right renal cortical cyst. The kidneys otherwise appear normal. No hydronephrosis. Urinary bladder is very distended but otherwise appears normal.    BOWEL: No bowel obstruction or inflammation. Postoperative changes in the right lower quadrant. No free intraperitoneal gas or fluid.    LYMPH NODES: Normal.    VASCULATURE: Atherosclerotic calcification of the aorta and its branches. No aneurysm.    PELVIC ORGANS: Normal.    MUSCULOSKELETAL: Normal.      Impression    IMPRESSION:  1.  No acute abnormality. No bowel obstruction or inflammation.  2.  Old granulomatous disease. No acute chest abnormality.  3.  Fatty infiltration of the liver.

## 2021-03-05 NOTE — PHARMACY-ADMISSION MEDICATION HISTORY
Pharmacy Medication History  Admission medication history interview status for the 3/5/2021  admission is complete. See EPIC admission navigator for prior to admission medications     Location of Interview: Phone  Medication history sources: Patient    Pt has no prior to admission medications.

## 2021-03-05 NOTE — CONSULTS
[facility]  Gastroenterology Consultation         Saroj Summers  [address] [facility]  [place] [address]  60 year old male    Admission Date/Time: 3/5/2021  Primary Care Provider: Clinic, [facility]  Referring / Attending Physician:  Dr. Dimitrios Yoder    We were asked to see the patient in consultation by Dr. Dimitrios Yoder for evaluation of hematemesis.      CC: Hematemesis and abdominal pain    HPI:  Saroj Summers is a 60 year old male who presents with abdominalin epigastric area and radiates to upper back and lower abdomen, nausea and vomiting, as well as hematemesis. He reports his stools have had small amounts or bright red in them. Nausea, vomiting and hematemesis symptoms over last few days while drinking 1-2 L daily.  He started drinking 2 weeks ago (prior 26 year of sobriety) and had been trying to cut back but had intermittent shaking. Abdominal pain has been present intermittently for over a year. In 07/2020 had laparoscopy appendectomy.    He denies chest pain, heartburn, prior PUD. Has had no fever, chills, cough or COVID exposure.    Last colonoscopy  5 years ago fro screening noting 12 mm polyp and resected. Recommended to f/u in 3 years ( has not had repeated since). No record of prior endoscopy.     -150. Afebrile. CMP notes albumin 3.0, normal LFTs, lactic aid 4.2. CBC WBC 10/6, Hemoglobin 14.1, Platelets 207. INR 1.20    ROS: A comprehensive ten point review of systems was negative aside from those in mentioned in the HPI.      PAST MED HX:  I have reviewed this patient's medical history and updated it with pertinent information if needed.   Past Medical History:   Diagnosis Date     Anxiety      Depression      Diverticulosis of sigmoid colon      PTSD (post-traumatic stress disorder)        MEDICATIONS:   None       ALLERGIES:   Allergies   Allergen Reactions     Nka [No Known Allergies]        SOCIAL HISTORY:  Social History     Tobacco  Use     Smoking status: Former Smoker     Packs/day: 15.00     Years: 0.50     Pack years: 7.50     Quit date:      Years since quittin.1     Smokeless tobacco: Current User     Types: Snuff   Substance Use Topics     Alcohol use: Not Currently     Comment: sober 2012     Drug use: No       FAMILY HISTORY:  Family History   Problem Relation Age of Onset     Cancer - colorectal Father      Alcohol/Drug Father      Cancer Paternal Grandmother      C.A.D. Paternal Grandfather      Neurologic Disorder Son         brain trauma at birth       PHYSICAL EXAM:   General  Alert, disoriented to time, and uncomfortable  Vital Signs with Ranges  Temp: 97.9  F (36.6  C) Temp src: Oral BP: (!) 151/91 Pulse: 75   Resp: 16 SpO2: 94 % O2 Device: None (Room air)    I/O last 3 completed shifts:  In: 100 [I.V.:100]  Out: -     Constitutional: alert, moderate distress, cooperative and cringing, tremors   Cardiovascular: negative, PMI normal. No lifts, heaves, or thrills. RRR. No murmurs, clicks gallops or rub  Respiratory: negative, Percussion normal. Good diaphragmatic excursion. Lungs clear  Head: Normocephalic. No masses, lesions, tenderness or abnormalities  Neck: Neck supple. No adenopathy. Thyroid symmetric, normal size,, Carotids without bruits.  Abdomen: Abdomen soft, non-tender. BS normal. No masses, organomegaly          ADDITIONAL COMMENTS:   I reviewed the patient's new clinical lab test results.   Recent Labs   Lab Test 21  1318   WBC  --  10.6 9.3 9.9   HGB 14.1 14.9 16.4 16.1   MCV  --  90 90 96   PLT  --  207 225 232   INR  --   --  1.20*  --      Recent Labs   Lab Test 21  1318   POTASSIUM 3.5 3.3* 3.1* 3.5   CHLORIDE 104  --  100 104   CO2 27  --  28 25   BUN 11  --  14 11   ANIONGAP 7  --  9 7     Recent Labs   Lab Test 21  0521  1615 20  1318   ALBUMIN 3.0* 3.5  --  3.9  "  BILITOTAL 1.1 0.6  --  0.6   ALT 29 34  --  87*   AST 22 27  --  76*   PROTEIN  --   --  Negative  --    LIPASE  --  168  --  91       I reviewed the patient's new imaging results.        CONSULTATION ASSESSMENT AND PLAN:    Saroj \"Emmy" is a pleasant 60 year old male with nausea, vomiting, hematemesis, hematochezia, abdominal pain, alcohol abuse and alcohol withdrawal. GI consulted on 3/5/21.    Active Problems:    Toxic encephalopathy    Abdominal pain    Nausea and vomiting    Hematemesis and hematochezia    Alcohol abuse    Patient has had epigastric pain that radiates to back that started with drinking as well as rectal pain and other diffuse abdominal pain since had appendectomy in 07/2020. Had hematemesis and described as bright red blood and hematochezia.  He is actively withdrawing and having seizures and tremors during my visit ( RN notified). His hemoglobin is stable.   Likely has PUD, rudolph bautista tear vs other source of upper GI bleed as well as possibly hemorrhoids, neoplastic (large rectal polyp on last colonoscopy in 2015), vs rectal ulcer.    Patient has stable hemoglobin and actively withdrawing from alcohol will recommend to stabilize patient and finish withdrawing. Then will plan EGD and colonoscopy when able.     -- Start clear liquid diet  -- Continue CIWA protocol  -- IV pantoprazole 40 mg BID  -- Hemoglobin q 8 hours  -- Consider EGD and colonoscopy in 1-2 days              [facility].  Office: [phone]  Cell : [phone] (Dr. Argueta)  Cell: [phone] (Jen Rogers PA-C)      "

## 2021-03-05 NOTE — ED NOTES
Writer walked into pts room, pt started to immediately seize. Morphine was then administered and the pt immediately stopped seizing and having full on conversation with writer.

## 2021-03-05 NOTE — UTILIZATION REVIEW
"  Concomitant stay Status; Secondary Review Determination     Admission Date: 3/5/2021  3:28 AM      Under the authority of the Utilization Management Committee, the utilization review process indicated a secondary review on the above patient.  The review outcome is based on review of the medical records, discussions with staff, and applying clinical experience noted on the date of the review.        (X)      Inpatient Status Appropriate - This patient's medical care is consistent with medical management for inpatient care and reasonable inpatient medical practice.      () Observation Status Appropriate - This patient does not meet hospital inpatient criteria and is placed in observation status. If this patient's primary payer is Medicare and was admitted as an inpatient, Condition Code 44 should be used and patient status changed to \"observation\".   () Admission Status NOT Appropriate - This patient's medical care is not consistent with medical management for Inpatient or Observation Status.          RATIONALE FOR DETERMINATION     Saroj Summers is a 60 year old gentleman with past medical history that is most notable for depression and anxiety and PTSD, who presents with abdominal pain, hematochezia, and hematemesis while intoxicated and found to have acute toxic encephalopathy to to alcohol intoxication.He was initially put in the hospital on observation status with the hope of a short hospital stay.  Unfortunately, he has developed acute alcohol withdrawals.  He is requiring additional doses of diazepam to treat withdrawals.  He is now expected to require a prolonged hospital stay.  Due to Mr. Summers's complex past medical history, alcohol intoxication, development of alcohol withdrawals, need for additional diazepam, and expectation of a prolonged hospital stay, it is appropriate to admit him to the hospital as an inpatient.    The severity of illness, intensity of service provided, expected LOS and risk " for adverse outcome make the care complex, high risk and appropriate for hospital admission.        The information on this document is developed by the utilization review team in order for the business office to ensure compliance.  This only denotes the appropriateness of proper admission status and does not reflect the quality of care rendered.         The definitions of Inpatient Status and Observation Status used in making the determination above are those provided in the CMS Coverage Manual, Chapter 1 and Chapter 6, section 70.4.      Sincerely,     Nilo Ayon D.O.  Utilization Review/ Case Management  [facility].

## 2021-03-05 NOTE — PLAN OF CARE
DATE & TIME: 3/5/2021 3421-1174    Cognitive Concerns/ Orientation : oriented to situation only at beginning of the shift, now more aware, disoriented to place.    BEHAVIOR & AGGRESSION TOOL COLOR: green, flat affect.   CIWA SCORE: 8 for nausea, mild tremor, HA, and anxiety. IV valium 5 mg once. Pt slept most of the shift.   ABNL VS/O2: vss, on RA, lungs clear.   MOBILITY: Ax1 with GB.   PAIN MANAGMENT: would wake up intermittently with lower abd pain. Treated with CIWA protocol, pt felt much better afterward.   DIET: clears.   BOWEL/BLADDER: continent of B&B.   ABNL LAB/BG: K+ 3.3, mag 1.9, both replaced per protocol. K+ recheck at 1400--. Mag will be rechecked in am. Will also recheck lactic with 1400 labs.   DRAIN/DEVICES: PIV on left arm, infusing. C/o pain with potassium IV infusion, better with hot pack.   TELEMETRY RHYTHM: na   SKIN: intact.   TESTS/PROCEDURES: na   D/C DAY/GOALS/PLACE: pending.   OTHER IMPORTANT INFO: GI following. Will have EGD and colonoscopy in 1-2 days once through withdrawal.   MD/RN ROUNDING SIGNED OFF D/E SHIFT: na   COMMIT TO SIT DONE AND SIGNED OFF na   IS THE PATIENT ON REMDESIVIR? DATE OF LAST SCHEDULED DOSE: na

## 2021-03-06 LAB
INTERPRETATION ECG - MUSE: NORMAL

## 2021-03-06 NOTE — DISCHARGE SUMMARY
[facility]    Discharge Summary  Hospitalist    Date of Admission:  3/5/2021  Date of Discharge:  3/5/2021  6:27 PM  Discharging Provider: Sean Correa MD  Date of Service (when I saw the patient): 3/5/21    Discharge Diagnoses   Acute toxic encephalopathy to to alcohol intoxication  Alcohol withdrawal  Alcohol use disorder  Hematemesis and hematochezia  Spells  Acute lactic metabolic acidosis  Hypokalemia  Adrenal mass  COVID-19 PCR NEGATIVE    History of Present Illness   Saroj Summers is a 60 year old gentleman with past medical history that is most notable for depression and anxiety and PTSD, who presents with abdominal pain, hematochezia, and hematemesis while intoxicated and found to have acute toxic encephalopathy to to alcohol intoxication.    Hospital Course   Saroj Summers was admitted on 3/5/2021.  The following problems were addressed during his hospitalization:    Disposition, left hospital against medical advice    Patient demanded to leave the hospital against medical advice on the evening of 3/5/21.    Patient was alert and oriented x 4, able to make decisions, not felt to be holdable.    Cross-cover hospitalist was called and prior to discharge.    He left the hospital against medical advice at about 18:30 PM on 3/5/21.    Acute toxic encephalopathy to to alcohol intoxication  Alcohol withdrawal  Alcohol use disorder  There is report that he had been sober for 26 years prior to this episode of binge drinking. He remains intoxicated and also delirious and somnolent after administration of Valium and Morphine in the ED.    Registered to observation initially, then admitted to inpatient.    Clear liquid diet.    CIWA protocol ordered with PRN Valium as indicated.    He was given IV fluids.    Started on thiamine, folic acid, multivitamin.    CD consult recommended once withdrawal symptoms improved.    He left the hospital against medical advice on  3/5/21.     Hematemesis and hematochezia  By report. None witnessed here thus far though dried blood was noted on arrival to the ED. He does not appear to have known prior history of GI bleeding.    GI consulted.    Was given IV PPI BID.    Hemoglobin was 13.7 when last checked at 15:37 on 3/5/21.    Clear liquid diet.    GI recommended EGD/colonoscopy once withdrawal symptoms improved, however patient left the hospital against medical advice before this could occur.     Spells  These seem less likely to have been true seizures as per ED report to me. Monitor very closely for development of seizures due to alcohol withdrawal.    Seizure precautions.    Has had some tremors but no seizure activity in the hospital per discussion with nursing.     Acute lactic metabolic acidosis  Likely alcoholic ketooacidosis.    Lactate trended down with IV fluids.     Hypokalemia    Resolved with replacement.    Magnesium within normal limits.     Adrenal mass  Noted on admission 7/2020 for appendicitis during which admission he underwent appendectomy. Follow up outpatient MRI in the same month revealed 2.3 cm right adrenal gland nodule of indeterminate etiology.    Adrenal nodule reported as stable compared to CT scan from September.    Follow-up with outpatient provider for ongoing monitoring.    COVID-19 PCR NEGATIVE    Asymptomatic COVID-19 PCR testing was NEGATIVE on 3/4/21.    Sean Correa MD    Significant Results and Procedures   As above.    Pending Results   None    Code Status   Full Code       Primary Care Physician   [facility]    Physical Exam   Temp: 98.1  F (36.7  C) Temp src: Oral BP: (!) 153/97 Pulse: 88   Resp: 16 SpO2: 98 % O2 Device: None (Room air)    Vitals:    03/05/21 0347   Weight: 94.5 kg (208 lb 5.4 oz)     Vital Signs with Ranges  Temp:  [97.8  F (36.6  C)-98.1  F (36.7  C)] 98.1  F (36.7  C)  Pulse:  [75-88] 88  Resp:  [16-18] 16  BP: (141-153)/(91-97) 153/97  SpO2:  [94 %-100 %] 98  %  I/O last 3 completed shifts:  In: 100 [I.V.:100]  Out: -     I did not see the patient at the time of discharge when he left the hospital against medical advice.    Discharge Disposition   Discharged to home  Condition at discharge: Fair    Consultations This Hospital Stay   GASTROENTEROLOGY IP CONSULT  CARE MANAGEMENT / SOCIAL WORK IP CONSULT    Time Spent on this Encounter   I, Sean Correa MD, personally saw the patient earlier in the day on 3/5/21, see separate progress note. I did not see him at the time of discharge when he left the hospital against medical advice.    Discharge Orders   No discharge procedures on file.     Discharge Medications   There are no discharge medications for this patient.    Allergies   Allergies   Allergen Reactions     Nka [No Known Allergies]      Data   Most Recent 3 CBC's:  Recent Labs   Lab Test 03/05/21 1537 03/05/21 0555 03/05/21 0353 03/04/21 2125 09/05/20  1318   WBC  --   --  10.6 9.3 9.9   HGB 13.7 14.1 14.9 16.4 16.1   MCV  --   --  90 90 96   PLT  --   --  207 225 232      Most Recent 3 BMP's:  Recent Labs   Lab Test 03/05/21 1537 03/05/21 0555 03/05/21 0353 03/04/21 2125 09/05/20  1318   NA  --  138  --  137 136   POTASSIUM 4.0 3.5 3.3* 3.1* 3.5   CHLORIDE  --  104  --  100 104   CO2  --  27  --  28 25   BUN  --  11  --  14 11   CR  --  0.91  --  1.04 0.87   ANIONGAP  --  7  --  9 7   JOHN  --  7.4*  --  8.2* 8.9   GLC  --  178*  --  204* 115*     Most Recent 2 LFT's:  Recent Labs   Lab Test 03/05/21 0555 03/04/21 2125   AST 22 27   ALT 29 34   ALKPHOS 87 110   BILITOTAL 1.1 0.6     Most Recent INR's and Anticoagulation Dosing History:  Anticoagulation Dose History     Recent Dosing and Labs Latest Ref Rng & Units 1/29/2011 3/4/2021    INR 0.86 - 1.14 0.98 1.20(H)        Most Recent 3 Troponin's:  Recent Labs   Lab Test 03/04/21 2125 06/10/19  2047   TROPI <0.015 <0.015     Results for orders placed or performed during the hospital encounter of  03/04/21   CT Chest/Abdomen/Pelvis w Contrast    Narrative    EXAM: CT CHEST/ABDOMEN/PELVIS W CONTRAST  LOCATION: [facility]  DATE/TIME: 3/5/2021 12:02 AM    INDICATION: Abdominal pain. Hematemesis. Hemoptysis. Cough.  COMPARISON: 09/05/2020.  TECHNIQUE: CT scan of the chest, abdomen, and pelvis was performed following injection of IV contrast. Multiplanar reformats were obtained. Dose reduction techniques were used.   CONTRAST: 98 mL Isovue 370    FINDINGS:   LUNGS AND PLEURA: Calcified granuloma in the right upper lobe laterally. Dependent atelectasis bilaterally. Band of scar or atelectasis in the inferior lingula. No pneumothorax or pleural effusion.    MEDIASTINUM/AXILLAE: Calcified lymph nodes in the mediastinum and right hilum. No lymph node enlargement. The heart size is normal. Central airways are unremarkable.    CORONARY ARTERY CALCIFICATION: None.    HEPATOBILIARY: Fatty infiltration of the liver. The gallbladder is distended but otherwise appears normal.    PANCREAS: Normal.    SPLEEN: Few small calcified granulomas.    ADRENAL GLANDS: 2.2 cm right adrenal gland nodule without significant interval change.    KIDNEYS/BLADDER: 2.8 cm right renal cortical cyst. The kidneys otherwise appear normal. No hydronephrosis. Urinary bladder is very distended but otherwise appears normal.    BOWEL: No bowel obstruction or inflammation. Postoperative changes in the right lower quadrant. No free intraperitoneal gas or fluid.    LYMPH NODES: Normal.    VASCULATURE: Atherosclerotic calcification of the aorta and its branches. No aneurysm.    PELVIC ORGANS: Normal.    MUSCULOSKELETAL: Normal.      Impression    IMPRESSION:  1.  No acute abnormality. No bowel obstruction or inflammation.  2.  Old granulomatous disease. No acute chest abnormality.  3.  Fatty infiltration of the liver.

## 2021-03-06 NOTE — PROGRESS NOTES
1800: pt wanted to leave AMA. Writer explained that he had multiple seizures due to ETOH withdrawal. Possible GI bleeding (bloody diarrhea and emesis prior to admission). Pt was explained of the need for EGD and colonoscopy once throughout withdrawal. Pt stated understanding but still insisted on leaving. Pt A&Ox4, steady on feet. Pt did have 5 mg IV valium around 0900 this am for CIWA of 8.  MD Iglesias notified. Not holdable. Pt was informed to watch for signs of withdrawal and seizure. Pt verbalized understanding and stated he would get a Uber ride and stay at his son's place in [place]. Pt left unit around 1830 in stable condition.

## 2021-03-09 ENCOUNTER — HOSPITAL ENCOUNTER (INPATIENT)
Facility: CLINIC | Age: 61
LOS: 3 days | Discharge: OTHER INSTITUTION | End: 2021-03-12
Attending: EMERGENCY MEDICINE | Admitting: PSYCHIATRY & NEUROLOGY
Payer: COMMERCIAL

## 2021-03-09 ENCOUNTER — TELEPHONE (OUTPATIENT)
Dept: BEHAVIORAL HEALTH | Facility: CLINIC | Age: 61
End: 2021-03-09

## 2021-03-09 DIAGNOSIS — I10 ESSENTIAL HYPERTENSION: Primary | ICD-10-CM

## 2021-03-09 DIAGNOSIS — F10.239 ALCOHOL DEPENDENCE WITH WITHDRAWAL WITH COMPLICATION (H): ICD-10-CM

## 2021-03-09 DIAGNOSIS — K92.0 HEMATEMESIS, PRESENCE OF NAUSEA NOT SPECIFIED: ICD-10-CM

## 2021-03-09 DIAGNOSIS — Z11.52 ENCOUNTER FOR SCREENING LABORATORY TESTING FOR SEVERE ACUTE RESPIRATORY SYNDROME CORONAVIRUS 2 (SARS-COV-2): ICD-10-CM

## 2021-03-09 LAB
ALBUMIN SERPL-MCNC: 3.6 G/DL (ref 3.4–5)
ALCOHOL BREATH TEST: 0.03 (ref 0–0.01)
ALP SERPL-CCNC: 118 U/L (ref 40–150)
ALT SERPL W P-5'-P-CCNC: 31 U/L (ref 0–70)
AMPHETAMINES UR QL SCN: POSITIVE
ANION GAP SERPL CALCULATED.3IONS-SCNC: 13 MMOL/L (ref 3–14)
AST SERPL W P-5'-P-CCNC: 35 U/L (ref 0–45)
BARBITURATES UR QL: NEGATIVE
BASOPHILS # BLD AUTO: 0.1 10E9/L (ref 0–0.2)
BASOPHILS NFR BLD AUTO: 0.4 %
BENZODIAZ UR QL: POSITIVE
BILIRUB SERPL-MCNC: 0.9 MG/DL (ref 0.2–1.3)
BUN SERPL-MCNC: 12 MG/DL (ref 7–30)
CALCIUM SERPL-MCNC: 9.4 MG/DL (ref 8.5–10.1)
CANNABINOIDS UR QL SCN: NEGATIVE
CHLORIDE SERPL-SCNC: 99 MMOL/L (ref 94–109)
CO2 SERPL-SCNC: 25 MMOL/L (ref 20–32)
COCAINE UR QL: POSITIVE
CREAT SERPL-MCNC: 0.78 MG/DL (ref 0.66–1.25)
DIFFERENTIAL METHOD BLD: ABNORMAL
EOSINOPHIL # BLD AUTO: 0 10E9/L (ref 0–0.7)
EOSINOPHIL NFR BLD AUTO: 0.2 %
ERYTHROCYTE [DISTWIDTH] IN BLOOD BY AUTOMATED COUNT: 13.4 % (ref 10–15)
ETHANOL UR QL SCN: NEGATIVE
FLUAV RNA RESP QL NAA+PROBE: NEGATIVE
FLUBV RNA RESP QL NAA+PROBE: NEGATIVE
GFR SERPL CREATININE-BSD FRML MDRD: >90 ML/MIN/{1.73_M2}
GLUCOSE SERPL-MCNC: 205 MG/DL (ref 70–99)
HCT VFR BLD AUTO: 39.2 % (ref 40–53)
HGB BLD-MCNC: 14 G/DL (ref 13.3–17.7)
IMM GRANULOCYTES # BLD: 0.1 10E9/L (ref 0–0.4)
IMM GRANULOCYTES NFR BLD: 0.5 %
LABORATORY COMMENT REPORT: NORMAL
LYMPHOCYTES # BLD AUTO: 1.5 10E9/L (ref 0.8–5.3)
LYMPHOCYTES NFR BLD AUTO: 11.9 %
MCH RBC QN AUTO: 32 PG (ref 26.5–33)
MCHC RBC AUTO-ENTMCNC: 35.7 G/DL (ref 31.5–36.5)
MCV RBC AUTO: 90 FL (ref 78–100)
MONOCYTES # BLD AUTO: 1.1 10E9/L (ref 0–1.3)
MONOCYTES NFR BLD AUTO: 8.6 %
NEUTROPHILS # BLD AUTO: 9.7 10E9/L (ref 1.6–8.3)
NEUTROPHILS NFR BLD AUTO: 78.4 %
NRBC # BLD AUTO: 0 10*3/UL
NRBC BLD AUTO-RTO: 0 /100
OPIATES UR QL SCN: NEGATIVE
PLATELET # BLD AUTO: 148 10E9/L (ref 150–450)
POTASSIUM SERPL-SCNC: 3.1 MMOL/L (ref 3.4–5.3)
PROT SERPL-MCNC: 6.7 G/DL (ref 6.8–8.8)
RBC # BLD AUTO: 4.38 10E12/L (ref 4.4–5.9)
RSV RNA SPEC QL NAA+PROBE: NORMAL
SARS-COV-2 RNA RESP QL NAA+PROBE: NEGATIVE
SODIUM SERPL-SCNC: 137 MMOL/L (ref 133–144)
SPECIMEN SOURCE: NORMAL
WBC # BLD AUTO: 12.4 10E9/L (ref 4–11)

## 2021-03-09 PROCEDURE — 80307 DRUG TEST PRSMV CHEM ANLYZR: CPT | Performed by: EMERGENCY MEDICINE

## 2021-03-09 PROCEDURE — 250N000013 HC RX MED GY IP 250 OP 250 PS 637: Performed by: EMERGENCY MEDICINE

## 2021-03-09 PROCEDURE — 80053 COMPREHEN METABOLIC PANEL: CPT | Performed by: EMERGENCY MEDICINE

## 2021-03-09 PROCEDURE — 96374 THER/PROPH/DIAG INJ IV PUSH: CPT | Performed by: EMERGENCY MEDICINE

## 2021-03-09 PROCEDURE — 99284 EMERGENCY DEPT VISIT MOD MDM: CPT | Performed by: EMERGENCY MEDICINE

## 2021-03-09 PROCEDURE — C9803 HOPD COVID-19 SPEC COLLECT: HCPCS | Performed by: EMERGENCY MEDICINE

## 2021-03-09 PROCEDURE — 80320 DRUG SCREEN QUANTALCOHOLS: CPT | Performed by: EMERGENCY MEDICINE

## 2021-03-09 PROCEDURE — 82075 ASSAY OF BREATH ETHANOL: CPT | Performed by: EMERGENCY MEDICINE

## 2021-03-09 PROCEDURE — 250N000011 HC RX IP 250 OP 636: Performed by: EMERGENCY MEDICINE

## 2021-03-09 PROCEDURE — 128N000004 HC R&B CD ADULT

## 2021-03-09 PROCEDURE — 87636 SARSCOV2 & INF A&B AMP PRB: CPT | Performed by: EMERGENCY MEDICINE

## 2021-03-09 PROCEDURE — 99285 EMERGENCY DEPT VISIT HI MDM: CPT | Mod: 25 | Performed by: EMERGENCY MEDICINE

## 2021-03-09 PROCEDURE — 250N000013 HC RX MED GY IP 250 OP 250 PS 637: Performed by: NURSE PRACTITIONER

## 2021-03-09 PROCEDURE — 85025 COMPLETE CBC W/AUTO DIFF WBC: CPT | Performed by: EMERGENCY MEDICINE

## 2021-03-09 RX ORDER — DIAZEPAM 5 MG
5-20 TABLET ORAL EVERY 30 MIN PRN
Status: DISCONTINUED | OUTPATIENT
Start: 2021-03-09 | End: 2021-03-12 | Stop reason: HOSPADM

## 2021-03-09 RX ORDER — ATENOLOL 50 MG/1
50 TABLET ORAL DAILY PRN
Status: DISCONTINUED | OUTPATIENT
Start: 2021-03-09 | End: 2021-03-12 | Stop reason: HOSPADM

## 2021-03-09 RX ORDER — ACETAMINOPHEN 325 MG/1
325-650 TABLET ORAL EVERY 4 HOURS PRN
Status: DISCONTINUED | OUTPATIENT
Start: 2021-03-09 | End: 2021-03-12 | Stop reason: HOSPADM

## 2021-03-09 RX ORDER — LORAZEPAM 1 MG/1
1 TABLET ORAL ONCE
Status: COMPLETED | OUTPATIENT
Start: 2021-03-09 | End: 2021-03-09

## 2021-03-09 RX ORDER — TRAZODONE HYDROCHLORIDE 50 MG/1
50 TABLET, FILM COATED ORAL
Status: DISCONTINUED | OUTPATIENT
Start: 2021-03-09 | End: 2021-03-12 | Stop reason: HOSPADM

## 2021-03-09 RX ORDER — FOLIC ACID 1 MG/1
1 TABLET ORAL DAILY
Status: DISCONTINUED | OUTPATIENT
Start: 2021-03-09 | End: 2021-03-12 | Stop reason: HOSPADM

## 2021-03-09 RX ORDER — NICOTINE 21 MG/24HR
1 PATCH, TRANSDERMAL 24 HOURS TRANSDERMAL DAILY
Status: DISCONTINUED | OUTPATIENT
Start: 2021-03-09 | End: 2021-03-12 | Stop reason: HOSPADM

## 2021-03-09 RX ORDER — HYDROXYZINE HYDROCHLORIDE 25 MG/1
25 TABLET, FILM COATED ORAL EVERY 4 HOURS PRN
Status: DISCONTINUED | OUTPATIENT
Start: 2021-03-09 | End: 2021-03-12 | Stop reason: HOSPADM

## 2021-03-09 RX ORDER — POTASSIUM CHLORIDE 1.5 G/1.58G
40 POWDER, FOR SOLUTION ORAL ONCE
Status: COMPLETED | OUTPATIENT
Start: 2021-03-09 | End: 2021-03-09

## 2021-03-09 RX ORDER — CLONIDINE HYDROCHLORIDE 0.1 MG/1
0.1 TABLET ORAL ONCE
Status: COMPLETED | OUTPATIENT
Start: 2021-03-09 | End: 2021-03-09

## 2021-03-09 RX ORDER — MULTIPLE VITAMINS W/ MINERALS TAB 9MG-400MCG
1 TAB ORAL DAILY
Status: DISCONTINUED | OUTPATIENT
Start: 2021-03-09 | End: 2021-03-12 | Stop reason: HOSPADM

## 2021-03-09 RX ORDER — CLONIDINE HYDROCHLORIDE 0.1 MG/1
0.1 TABLET ORAL EVERY 6 HOURS PRN
Status: DISCONTINUED | OUTPATIENT
Start: 2021-03-09 | End: 2021-03-12 | Stop reason: HOSPADM

## 2021-03-09 RX ORDER — ONDANSETRON 4 MG/1
4 TABLET, ORALLY DISINTEGRATING ORAL EVERY 6 HOURS PRN
Status: DISCONTINUED | OUTPATIENT
Start: 2021-03-09 | End: 2021-03-09

## 2021-03-09 RX ORDER — AMOXICILLIN 250 MG
1 CAPSULE ORAL AT BEDTIME
Status: DISCONTINUED | OUTPATIENT
Start: 2021-03-09 | End: 2021-03-12 | Stop reason: HOSPADM

## 2021-03-09 RX ORDER — ONDANSETRON 4 MG/1
4 TABLET, ORALLY DISINTEGRATING ORAL EVERY 6 HOURS PRN
Status: DISCONTINUED | OUTPATIENT
Start: 2021-03-09 | End: 2021-03-12 | Stop reason: HOSPADM

## 2021-03-09 RX ORDER — LORAZEPAM 1 MG/1
1-4 TABLET ORAL EVERY 30 MIN PRN
Status: DISCONTINUED | OUTPATIENT
Start: 2021-03-09 | End: 2021-03-09

## 2021-03-09 RX ORDER — ONDANSETRON 2 MG/ML
4 INJECTION INTRAMUSCULAR; INTRAVENOUS ONCE
Status: COMPLETED | OUTPATIENT
Start: 2021-03-09 | End: 2021-03-09

## 2021-03-09 RX ORDER — LANOLIN ALCOHOL/MO/W.PET/CERES
100 CREAM (GRAM) TOPICAL DAILY
Status: DISCONTINUED | OUTPATIENT
Start: 2021-03-09 | End: 2021-03-12 | Stop reason: HOSPADM

## 2021-03-09 RX ORDER — MAGNESIUM HYDROXIDE/ALUMINUM HYDROXICE/SIMETHICONE 120; 1200; 1200 MG/30ML; MG/30ML; MG/30ML
30 SUSPENSION ORAL EVERY 4 HOURS PRN
Status: DISCONTINUED | OUTPATIENT
Start: 2021-03-09 | End: 2021-03-12 | Stop reason: HOSPADM

## 2021-03-09 RX ADMIN — CLONIDINE HYDROCHLORIDE 0.1 MG: 0.1 TABLET ORAL at 13:22

## 2021-03-09 RX ADMIN — LORAZEPAM 1 MG: 1 TABLET ORAL at 11:19

## 2021-03-09 RX ADMIN — NICOTINE POLACRILEX 4 MG: 2 GUM, CHEWING ORAL at 20:00

## 2021-03-09 RX ADMIN — DIAZEPAM 10 MG: 5 TABLET ORAL at 19:02

## 2021-03-09 RX ADMIN — POTASSIUM CHLORIDE 40 MEQ: 1.5 POWDER, FOR SOLUTION ORAL at 12:22

## 2021-03-09 RX ADMIN — ONDANSETRON 4 MG: 2 INJECTION INTRAMUSCULAR; INTRAVENOUS at 13:02

## 2021-03-09 RX ADMIN — LORAZEPAM 2 MG: 1 TABLET ORAL at 13:01

## 2021-03-09 RX ADMIN — NICOTINE 1 PATCH: 21 PATCH, EXTENDED RELEASE TRANSDERMAL at 19:01

## 2021-03-09 RX ADMIN — DIAZEPAM 10 MG: 5 TABLET ORAL at 20:14

## 2021-03-09 ASSESSMENT — ENCOUNTER SYMPTOMS
SHORTNESS OF BREATH: 0
ABDOMINAL PAIN: 0
BLOOD IN STOOL: 0
MYALGIAS: 1
COUGH: 0
VOMITING: 0
CONFUSION: 1
SEIZURES: 0
FEVER: 0
TREMORS: 1

## 2021-03-09 ASSESSMENT — ACTIVITIES OF DAILY LIVING (ADL)
DIFFICULTY_COMMUNICATING: NO
TOILETING_ISSUES: NO
DIFFICULTY_EATING/SWALLOWING: NO
ORAL_HYGIENE: INDEPENDENT
DRESS: INDEPENDENT
HYGIENE/GROOMING: INDEPENDENT
DRESSING/BATHING_DIFFICULTY: NO

## 2021-03-09 NOTE — ED NOTES
Spoke with Mother elba who had called and was updated on the plan of care. Writer gave phone number of detox to mother

## 2021-03-09 NOTE — ED PROVIDER NOTES
"ED Provider Note  [facility]      History     Chief Complaint   Patient presents with     Alcohol Problem     Requesting detox for ETOH. Drinks 1 liter daily. Last drink: yesterday around 10 pm. Reports hx of seizures, last seizure was 5 days ago.      The history is provided by the patient and medical records.   Alcohol Problem  Pertinent negatives include no chest pain, no abdominal pain and no shortness of breath.   Saroj Summers is a 60 year old male with a past medical history significant for alcoholism, alcohol withdrawal syndrome, acute appendicitis, PTSD, anxiety, depression and tobacco abuse who presents to the ED for evaluation of alcohol problem.  The patient states that he drinks 1 L daily as he has been discharged in the hospital.  Patient states that he has been having generalized body pain, shakes and has not been able to walk very good with poor coordination.  He also notes that his thought process has been \"messed up.\"  He states that his last drink was around 10 PM yesterday.  He reports having history of seizures with his last seizure about 5 days ago.  This morning, he states that he looked in the mirror and noticed that his skin appeared erythematous.  The patient is in the ED requesting detox for alcohol.  Patient states he has been living with his mother and caring for her.  He denies any blood in his stool, hematemesis, cough or shortness of breath.  The patient does have high blood pressure but is not on any medication.  The patient does smoke a few cigarettes but denies any drug use.    Per the patient's medical record, on 3/4/2021, the patient presented to the ED for evaluation of nausea, vomiting and hematemesis.  He stated that he was sober for 26 years but started drinking heavily over the last week.  He states that he drinks 2 L of hard alcohol per day.  The patient tried to detox himself but he has been experiencing alcohol withdrawal seizures.  He " also noted that he started getting vomiting blood 3 days prior to arrival.  His hemoglobin was stable at 16.4.  INR slightly elevated at 1.2.  LFTs are within normal limits.  He was given both Protonix and octreotide for GI bleed.  Potassium 3.1  Which was supplemented.  Patient's alcohol level was 0.31.  Patient did have multiple seizures during his ED visit and was given a total of 10 mg of IV diazepam.  They stated that it was unclear if he is truly having withdrawal seizures as he does remain alert.  He was admitted to [facility] for further care but then left AMA the next morning.    Past Medical History  Past Medical History:   Diagnosis Date     Anxiety      Depression      Diverticulosis of sigmoid colon      PTSD (post-traumatic stress disorder)      Past Surgical History:   Procedure Laterality Date     LAPAROSCOPIC APPENDECTOMY N/A 2020    Procedure: Laparoscopic Appendectomy;  Surgeon: Valeriano Vega DO;  Location: [facility] OR     No current outpatient medications on file.    Allergies   Allergen Reactions     Nka [No Known Allergies]      Family History  Family History   Problem Relation Age of Onset     Cancer - colorectal Father      Alcohol/Drug Father      Cancer Paternal Grandmother      C.A.D. Paternal Grandfather      Neurologic Disorder Son         brain trauma at birth     Social History   Social History     Tobacco Use     Smoking status: Former Smoker     Packs/day: 15.00     Years: 0.50     Pack years: 7.50     Quit date:      Years since quittin.1     Smokeless tobacco: Current User     Types: Snuff   Substance Use Topics     Alcohol use: Not Currently     Comment: sober 2012     Drug use: No      Past medical history, past surgical history, medications, allergies, family history, and social history were reviewed with the patient. No additional pertinent items.       Review of Systems   Constitutional: Negative for fever.   Respiratory: Negative for cough and  shortness of breath.    Cardiovascular: Negative for chest pain.   Gastrointestinal: Negative for abdominal pain, blood in stool and vomiting (Nonbloody).   Musculoskeletal: Positive for gait problem and myalgias.   Neurological: Positive for tremors. Negative for seizures.   Psychiatric/Behavioral: Positive for confusion.   All other systems reviewed and are negative.    A complete review of systems was performed with pertinent positives and negatives noted in the HPI, and all other systems negative.    Physical Exam   BP: (!) 187/102  Pulse: 107  Temp: 97.6  F (36.4  C)  Resp: 20  Weight: 92.1 kg (203 lb)  SpO2: 96 %  Physical Exam  Constitutional:       General: He is not in acute distress.     Appearance: Normal appearance. He is well-developed. He is not ill-appearing.   HENT:      Head: Normocephalic and atraumatic.   Neck:      Musculoskeletal: Normal range of motion and neck supple.   Cardiovascular:      Rate and Rhythm: Normal rate and regular rhythm.      Heart sounds: Normal heart sounds.   Pulmonary:      Effort: Pulmonary effort is normal. No respiratory distress.      Breath sounds: No wheezing.   Abdominal:      General: There is no distension.      Palpations: Abdomen is soft. There is no mass.      Tenderness: There is no abdominal tenderness. There is no rebound.      Hernia: No hernia is present.   Skin:     General: Skin is warm.   Neurological:      Mental Status: He is alert and oriented to person, place, and time.   Psychiatric:         Behavior: Behavior normal.         Thought Content: Thought content normal.         ED Course       10:57 AM  The patient was seen and examined by Bibiana Caldera MD in Room ED20.   Procedures                 Results for orders placed or performed during the hospital encounter of 03/09/21   Drug abuse screen 6 urine (tox)     Status: Abnormal   Result Value Ref Range    Amphetamine Qual Urine Positive (A) NEG^Negative    Barbiturates Qual Urine Negative  NEG^Negative    Benzodiazepine Qual Urine Positive (A) NEG^Negative    Cannabinoids Qual Urine Negative NEG^Negative    Cocaine Qual Urine Positive (A) NEG^Negative    Ethanol Qual Urine Negative NEG^Negative    Opiates Qualitative Urine Negative NEG^Negative   Comprehensive metabolic panel     Status: Abnormal   Result Value Ref Range    Sodium 137 133 - 144 mmol/L    Potassium 3.1 (L) 3.4 - 5.3 mmol/L    Chloride 99 94 - 109 mmol/L    Carbon Dioxide 25 20 - 32 mmol/L    Anion Gap 13 3 - 14 mmol/L    Glucose 205 (H) 70 - 99 mg/dL    Urea Nitrogen 12 7 - 30 mg/dL    Creatinine 0.78 0.66 - 1.25 mg/dL    GFR Estimate >90 >60 mL/min/[1.73_m2]    GFR Estimate If Black >90 >60 mL/min/[1.73_m2]    Calcium 9.4 8.5 - 10.1 mg/dL    Bilirubin Total 0.9 0.2 - 1.3 mg/dL    Albumin 3.6 3.4 - 5.0 g/dL    Protein Total 6.7 (L) 6.8 - 8.8 g/dL    Alkaline Phosphatase 118 40 - 150 U/L    ALT 31 0 - 70 U/L    AST 35 0 - 45 U/L   CBC with platelets differential     Status: Abnormal   Result Value Ref Range    WBC 12.4 (H) 4.0 - 11.0 10e9/L    RBC Count 4.38 (L) 4.4 - 5.9 10e12/L    Hemoglobin 14.0 13.3 - 17.7 g/dL    Hematocrit 39.2 (L) 40.0 - 53.0 %    MCV 90 78 - 100 fl    MCH 32.0 26.5 - 33.0 pg    MCHC 35.7 31.5 - 36.5 g/dL    RDW 13.4 10.0 - 15.0 %    Platelet Count 148 (L) 150 - 450 10e9/L    Diff Method Automated Method     % Neutrophils 78.4 %    % Lymphocytes 11.9 %    % Monocytes 8.6 %    % Eosinophils 0.2 %    % Basophils 0.4 %    % Immature Granulocytes 0.5 %    Nucleated RBCs 0 0 /100    Absolute Neutrophil 9.7 (H) 1.6 - 8.3 10e9/L    Absolute Lymphocytes 1.5 0.8 - 5.3 10e9/L    Absolute Monocytes 1.1 0.0 - 1.3 10e9/L    Absolute Eosinophils 0.0 0.0 - 0.7 10e9/L    Absolute Basophils 0.1 0.0 - 0.2 10e9/L    Abs Immature Granulocytes 0.1 0 - 0.4 10e9/L    Absolute Nucleated RBC 0.0    Asymptomatic Influenza A/B & SARS-CoV2 (COVID-19) Virus PCR Multiplex     Status: None    Specimen: Nasopharyngeal   Result Value Ref Range     Flu A/B & SARS-COV-2 PCR Source Nasopharyngeal     SARS-CoV-2 PCR Result NEGATIVE     Influenza A PCR Negative NEG^Negative    Influenza B PCR Negative NEG^Negative    Respiratory Syncytial Virus PCR (Note)     Flu A/B & SARS-CoV-2 PCR Comment (Note)    Alcohol breath test POCT     Status: Abnormal   Result Value Ref Range    Alcohol Breath Test 0.028 (A) 0.00 - 0.01     Medications   LORazepam (ATIVAN) tablet 1-4 mg (2 mg Oral Given 3/9/21 1301)   LORazepam (ATIVAN) tablet 1 mg (1 mg Oral Given 3/9/21 1119)   potassium chloride (KLOR-CON) Packet 40 mEq (40 mEq Oral Given 3/9/21 1222)   ondansetron (ZOFRAN) injection 4 mg (4 mg Intravenous Given 3/9/21 1302)   cloNIDine (CATAPRES) tablet 0.1 mg (0.1 mg Oral Given 3/9/21 1322)             Results for orders placed or performed during the hospital encounter of 03/09/21   Alcohol breath test POCT     Status: Abnormal   Result Value Ref Range    Alcohol Breath Test 0.028 (A) 0.00 - 0.01     Medications - No data to display     Assessments & Plan (with Medical Decision Making)   Patient is a 60-year-old male who presented to the ER seeking detox for alcohol use.  Patient was most recently discharged from the hospital 6 days prior after being admitted for alcohol withdrawal symptoms as well as concern for possible GI bleed.  Patient's hemoglobin remained stable while he was in the hospital and he had no evidence of a GI bleed.  Patient says that he has not noticed any bleeding from vomit or from stools in the last 6 days.  Patient here vital signs were stable.  Patient's medical chart was reviewed.  I did review patient's previous medical stay at [facility].  Patient here had a stable hemoglobin of 14 that was reviewed by myself.  At this time patient is medically stable and does not need any ongoing inpatient care.  Plan will be to admit the patient to chemical detox for further treatment of his alcohol abuse.  I did discuss the case with the chemical  dependency doctor on the phone.  She is agreed except the patient to be admitted to her service.  Patient is remained medically stable throughout his stay in the ER.  Patient is stable to be admitted to CD for further care.    I have reviewed the nursing notes. I have reviewed the findings, diagnosis, plan and need for follow up with the patient.    New Prescriptions    No medications on file       Final diagnoses:   Alcohol dependence with withdrawal with complication (H)       --  I, Tushar Capps, am serving as a trained medical scribe to document services personally performed by Bibiana Caldera MD, based on the provider's statements to me.     I, Bibiana Caldera MD, was physically present and have reviewed and verified the accuracy of this note documented by Tushar Capps.    Bibiana Caldera MD  [facility] EMERGENCY DEPARTMENT  3/9/2021     Bibiana Caldera MD  03/09/21 6350

## 2021-03-09 NOTE — TELEPHONE ENCOUNTER
Patient cleared and ready for behavioral bed placement: Yes    S Pt is a 60 year old male at the [place] ed.     B Pt was sober for 20 years. Pt relapsed 1 month ago. Pt was admitted last week to medical and discharged 3/5 due to GI bleeding, toxic encephalopahthy, and sz like activity due to ETOH. Pt denies history of dts but appeared to be delirious while on medical. Pt has been drinking last 5 days a liter of vodka since discharge from medical. Pt breathalyzer was 0.028.. ED gave 1mg ativan in ed and will be given 40mEQ potassium oral tab. Pt has history of hypertension. Pt is medically cleared in ed with medical complications of acute appendicitis, hypokalemia . Pt denies covid19 symptoms. Labs have resulted potassium 3.1, glucose 205, WBC 12.4. and absolute neutrophil 9.7. COVID19 negative.     A Vol    R 3aw/Veluvali    /102  P 91  T 97.6  R 16    - intake spoke to ed rn 1247pm and discussed case pt scored a 9, 11 on MSSA protocol, potassium will be given, and rn is re-checking pt blood pressure, and attempting to get UTOX, pt is able to ambulate independently and is providing uds    - 1259pm doc-doc started on call provider has concerns regarding medical stability. Pt was admitted to medical and GI bleed was addressed. Md in ed says hemoglobin is stable and . Blood pressure 172/102 (asymptomatic hypertension per ed). On call provider would like blood pressure to come down. Pt has historical high blood pressure. Pt left AMA from medical. Pt is help seeking and pt wants detox and treatment. Per ed md pt is medically cleared with no acute medical concerns. Pt will be given medication for blood pressure prior to admission.      - unit and ed aware of admission 330pm admit

## 2021-03-09 NOTE — PHARMACY-ADMISSION MEDICATION HISTORY
Admission Medication History Completed by Pharmacy    See [facility] Admission Navigator for allergy information, preferred outpatient pharmacy, prior to admission medications and immunization status.     Medication History Sources:     Patient, Care Everywhere, electronic fill history    Changes made to PTA medication list (reason):    Added: None    Deleted: None    Changed: None    Additional Information:    Patient reports taking no prescription or OTC medications, vitamins, supplements, etc.    Patient did report he was on a blood pressure medication about 7 months ago. He thought it was perhaps lisinopril when I listed off a few names of blood pressure medications. However, I see he got losartan 50mg daily filled June 2020, so I think it is that medication. He would like to be restarted on it if his provider in the hospital thinks it would be appropriate.    Prior to Admission medications    Not on File       Date completed: 03/09/21    Medication history completed by: Dali Silverio, PharmD, BCPS

## 2021-03-09 NOTE — PROGRESS NOTES
03/09/21 1632   Patient Belongings   Did you bring any home meds/supplements to the hospital?  No   Patient Belongings Put in Hospital Secure Location (Security or Locker, etc.) other (see comments)   Belongings Search Yes   Clothing Search Yes   Second Staff ellen     Storage bin: jacket, pans with string x2 , sweatshirt with string, gloves, Ziploc eith lighters, cigs, keys, chaptstick, alcohol wipes    Box in med room: wallet, phone, papers    Security env # [id number]: passport, birth certificate, 4 visa cards, mastercard,$25.00 cash    ADMISSION:  I am responsible for any personal items that are not sent to the safe or pharmacy. [facility] is not responsible for loss, theft or damage of any property in my possession.    Patient Signature _____________________ Date/Time _____________________    Staff Signature _______________________ Date/Time _____________________    2nd Staff person, if patient is unable/unwilling to sign    ___________________________________ Date/Time _____________________    DISCHARGE:    All personal items have been returned to me.    Patient Signature _____________________ Date/Time _____________________    Staff Signature _______________________ Date/Time _____________________

## 2021-03-10 LAB
CK SERPL-CCNC: 243 U/L (ref 30–300)
GGT SERPL-CCNC: 124 U/L (ref 0–75)
LIPASE SERPL-CCNC: 306 U/L (ref 73–393)
POTASSIUM SERPL-SCNC: 3.8 MMOL/L (ref 3.4–5.3)

## 2021-03-10 PROCEDURE — 99207 PR CONSULT E&M CHANGED TO INITIAL LEVEL: CPT | Performed by: NURSE PRACTITIONER

## 2021-03-10 PROCEDURE — 93010 ELECTROCARDIOGRAM REPORT: CPT | Performed by: INTERNAL MEDICINE

## 2021-03-10 PROCEDURE — 250N000013 HC RX MED GY IP 250 OP 250 PS 637: Performed by: NURSE PRACTITIONER

## 2021-03-10 PROCEDURE — 83690 ASSAY OF LIPASE: CPT | Performed by: NURSE PRACTITIONER

## 2021-03-10 PROCEDURE — 93005 ELECTROCARDIOGRAM TRACING: CPT

## 2021-03-10 PROCEDURE — 99223 1ST HOSP IP/OBS HIGH 75: CPT | Performed by: PSYCHIATRY & NEUROLOGY

## 2021-03-10 PROCEDURE — 82977 ASSAY OF GGT: CPT | Performed by: NURSE PRACTITIONER

## 2021-03-10 PROCEDURE — 84132 ASSAY OF SERUM POTASSIUM: CPT | Performed by: NURSE PRACTITIONER

## 2021-03-10 PROCEDURE — 82550 ASSAY OF CK (CPK): CPT | Performed by: NURSE PRACTITIONER

## 2021-03-10 PROCEDURE — 99223 1ST HOSP IP/OBS HIGH 75: CPT | Performed by: NURSE PRACTITIONER

## 2021-03-10 PROCEDURE — HZ2ZZZZ DETOXIFICATION SERVICES FOR SUBSTANCE ABUSE TREATMENT: ICD-10-PCS | Performed by: PSYCHIATRY & NEUROLOGY

## 2021-03-10 PROCEDURE — 36415 COLL VENOUS BLD VENIPUNCTURE: CPT | Performed by: NURSE PRACTITIONER

## 2021-03-10 PROCEDURE — 128N000004 HC R&B CD ADULT

## 2021-03-10 RX ORDER — LOSARTAN POTASSIUM 25 MG/1
25 TABLET ORAL DAILY
Status: DISCONTINUED | OUTPATIENT
Start: 2021-03-10 | End: 2021-03-12

## 2021-03-10 RX ORDER — PANTOPRAZOLE SODIUM 40 MG/1
40 TABLET, DELAYED RELEASE ORAL
Status: DISCONTINUED | OUTPATIENT
Start: 2021-03-11 | End: 2021-03-12 | Stop reason: HOSPADM

## 2021-03-10 RX ORDER — GABAPENTIN 300 MG/1
600 CAPSULE ORAL 3 TIMES DAILY
Status: DISCONTINUED | OUTPATIENT
Start: 2021-03-10 | End: 2021-03-12 | Stop reason: HOSPADM

## 2021-03-10 RX ORDER — METHOCARBAMOL 500 MG/1
500 TABLET, FILM COATED ORAL 4 TIMES DAILY PRN
Status: DISCONTINUED | OUTPATIENT
Start: 2021-03-10 | End: 2021-03-12 | Stop reason: HOSPADM

## 2021-03-10 RX ADMIN — METHOCARBAMOL 500 MG: 500 TABLET ORAL at 20:17

## 2021-03-10 RX ADMIN — DIAZEPAM 10 MG: 5 TABLET ORAL at 16:38

## 2021-03-10 RX ADMIN — NICOTINE POLACRILEX 4 MG: 2 GUM, CHEWING ORAL at 12:21

## 2021-03-10 RX ADMIN — NICOTINE 1 PATCH: 21 PATCH, EXTENDED RELEASE TRANSDERMAL at 12:22

## 2021-03-10 RX ADMIN — MULTIPLE VITAMINS W/ MINERALS TAB 1 TABLET: TAB at 08:30

## 2021-03-10 RX ADMIN — DIAZEPAM 10 MG: 5 TABLET ORAL at 20:17

## 2021-03-10 RX ADMIN — NICOTINE POLACRILEX 4 MG: 2 GUM, CHEWING ORAL at 13:48

## 2021-03-10 RX ADMIN — DIAZEPAM 10 MG: 5 TABLET ORAL at 00:52

## 2021-03-10 RX ADMIN — DIAZEPAM 10 MG: 5 TABLET ORAL at 12:21

## 2021-03-10 RX ADMIN — HYDROXYZINE HYDROCHLORIDE 25 MG: 25 TABLET, FILM COATED ORAL at 08:31

## 2021-03-10 RX ADMIN — NICOTINE POLACRILEX 4 MG: 2 GUM, CHEWING ORAL at 16:38

## 2021-03-10 RX ADMIN — GABAPENTIN 600 MG: 300 CAPSULE ORAL at 20:17

## 2021-03-10 RX ADMIN — FOLIC ACID 1 MG: 1 TABLET ORAL at 08:30

## 2021-03-10 RX ADMIN — NICOTINE POLACRILEX 2 MG: 2 GUM, CHEWING ORAL at 08:30

## 2021-03-10 RX ADMIN — GABAPENTIN 600 MG: 300 CAPSULE ORAL at 13:47

## 2021-03-10 RX ADMIN — DIAZEPAM 10 MG: 5 TABLET ORAL at 08:31

## 2021-03-10 RX ADMIN — METHOCARBAMOL 500 MG: 500 TABLET ORAL at 12:24

## 2021-03-10 RX ADMIN — THIAMINE HCL TAB 100 MG 100 MG: 100 TAB at 08:30

## 2021-03-10 RX ADMIN — DIAZEPAM 10 MG: 5 TABLET ORAL at 04:52

## 2021-03-10 RX ADMIN — NICOTINE POLACRILEX 4 MG: 2 GUM, CHEWING ORAL at 21:48

## 2021-03-10 RX ADMIN — NICOTINE 1 PATCH: 21 PATCH, EXTENDED RELEASE TRANSDERMAL at 08:31

## 2021-03-10 RX ADMIN — LOSARTAN POTASSIUM 25 MG: 25 TABLET, FILM COATED ORAL at 18:19

## 2021-03-10 ASSESSMENT — ACTIVITIES OF DAILY LIVING (ADL)
LAUNDRY: WITH SUPERVISION
DRESS: STREET CLOTHES
HYGIENE/GROOMING: INDEPENDENT
ORAL_HYGIENE: INDEPENDENT
ORAL_HYGIENE: INDEPENDENT
HYGIENE/GROOMING: INDEPENDENT
DRESS: STREET CLOTHES
LAUNDRY: WITH SUPERVISION

## 2021-03-10 NOTE — PROGRESS NOTES
Pt.is on MSSA monitoring for alcohol withdrawal. MSSA scores = 9 & 8. He got 10mg prn valium X2. Denied any medication adverse side effects and none observed. Will continue to monitor.

## 2021-03-10 NOTE — PROGRESS NOTES
PDMP as of 3/10/2021:     Saroj Summers   Risk Indicators   NARX SCORES  Narcotic  040  Sedative  020  Stimulant  000  Explanation and Guidance  OVERDOSE RISK SCORE     190    (Range 000-999)  Explanation and Guidance  ADDITIONAL RISK INDICATORS ( 0 )     Explanation and Guidance   This [facility] report is based on search criteria supplied and the data entered by the dispensing pharmacy. For more information about any prescription, please contact the dispensing pharmacy or the prescriber. NarxCare scores and reports are intended to aid, not replace, medical decision making. None of the information presented should be used as sole justification for providing or refusing to provide medications. The information on this report is not warranted as accurate or complete.   Graphs   RX GRAPH  Narcotic Buprenorphine Sedative Stimulant Other     Prescribers    1 - Valeriano Jennings    Timeline  03/10  2m  6m  1y  2y    Buprenorphine mg    16    4    0  28    Timeline  03/10  2m  6m  1y  2y  Morphine MgEq (MME)    200    80    0  320    Timeline  03/10  2m  6m  1y  2y    Lorazepam MgEq (LME)    10    2    0  18    Timeline  03/10  2m  6m  1y  2y  *Per CDC guidance, the MME conversion factors prescribed or provided as part of the medication-assisted treatment for opioid use disorder should not be used to benchmark against dosage thresholds meant for opioids prescribed for pain. Buprenorphine products have no agreed upon morphine equivalency, and as partial opioid agonists, are not expected to be associated with overdose risk in the same dose-dependent manner as doses for full agonist opioids. MME = morphine milligram equivalents. LME = Lorazepam milligram equivalents. mg = dose in milligrams.     Summary     Summary  Total Prescriptions:     1   Total Prescribers:     1   Total Pharmacies:     1   Narcotics* (excluding Buprenorphine)  Current Qty:     0   Current MME/day:     0.00  30 Day Avg MME/day:     0.00    Sedatives*  Current Qty:     0   Current LME/day:    0.00  30 Day Avg LME/day:    0.00  Buprenorphine*  Current Qty:     0   Current mg/day:    0.00  30 Day Avg mg/day:    0.00  Rx Data   PRESCRIPTIONS  Total Prescriptions:  1  Total Private Pay:  0  Fill Date  ID  Written  Sold  Drug  Qty  Days  Prescriber  Rx #  Pharmacy  Refill  Daily Dose *  Pymt Type    07/14/2020   1   07/14/2020 07/14/2020   Oxycodone Hcl 5 Mg Tablet   12.00  3  [facility]   [id number]   Shakir ([id number])   0/0  30.00 MME  Medicaid   [place]  *Per CDC guidance, the MME conversion factors prescribed or provided as part of the medication-assisted treatment for opioid use disorder should not be used to benchmark against dosage thresholds meant for opioids prescribed for pain. Buprenorphine products have no agreed upon morphine equivalency, and as partial opioid agonists, are not expected to be associated with overdose risk in the same dose-dependent manner as doses for full agonist opioids. MME = morphine milligram equivalents. LME = Lorazepam milligram equivalents. mg = dose in milligrams.     Providers  Total Providers: 1   Name   Address   City   State   Zipcode   Phone   Valeriano Vega [place]  [address]  [address] [phone]  Pharmacies  Total Pharmacies: 1   Name   Address   City   State   Zipcode   Phone   [facility] [place] [phone]  [address] [phone]    The report provided is based upon the search criteria entered and the corresponding data as it has been reported by dispenser(s). If erroneous information is identified or additional information is needed, please contact the dispenser or the prescriber provided on the report. Date Sold signifies the date the prescription was sold (left the pharmacy). The absence of Date Sold does not necessarily indicate the prescription was not dispensed. Fill Date represents the date the medication was filled or prepared by the pharmacy.  Note, federal regulation (CFR Title 42: Part 2) requires patient consent prior to releasing certain patient data from federally funded opioid treatment programs (OTPs). As such, controlled substances dispensed from OTPs for medication-assisted treatment may not appear in the [place]  report. Morphine milligram equivalent (MME) conversion factors published by the CDC are used in the MME calculation. Per the CDC, the MME conversion factor is intended only for analytic purposes where prescription data are used to retrospectively calculate daily MME to inform analyses of risks associated with opioid prescribing. This value does not constitute clinical guidance or recommendations for converting patients from one form of opioid analgesic to another. Per the CDC, the conversion factors for drugs prescribed or provided, as part of medication-assisted treatment for opioid use disorder should not be used to benchmark against MME dosage thresholds meant for opioids prescribed for pain. Buprenorphine products listed in the CDC s MME file do not have an associated conversion factor. Lastly, the CDC notes, in clinical practice, calculating MME for methadone often involves a sliding-scale approach, whereby the conversion factor increases with increasing dose. The conversion factor of 3 for methadone presented in this file could underestimate MME for a given patient. This report contains confidential information, including patient identifiers, and is not a public record. The information on this report must be treated as protected health information and is only to be disclosed to others as authorized by applicable state and Federal regulations.           Powered By       [place] Prescription Monitoring Program  [place] [employer]  [address] [place] [address] [address]  [place] [address]  7 [phone]     [employer] 2021. All Rights Reserved. Privacy Policy

## 2021-03-10 NOTE — CONSULTS
HOSPITAL MEDICINE SERVICE  CONSULTATION  3/10/2021    Saroj Summers,  1960, MRN [id number]  Code status: Full Code    ASSESSMENT / PLAN   Discussion: Saroj is a 60 year old male with past medical history including hypertension, hepatic steatosis, methamphetamine use, cocaine use, alcohol use disorder, adrenal mass, and recent hematemesis with hematochezia who presented to ED for detox.  Hospital medicine consultation for co-management of detox.    #Alcohol use disorder  #Alcohol withdrawal  Saroj reported alcohol use 1 L/day to emergency department physician.  Reported last drink on 3/8 around 2200.  He does have a history of alcohol withdrawal seizures and delirium tremens.  He states that he was trying to cut back on alcohol by himself at home and experienced seizures. At that point, he reports that he consumed more alcohol to stop the seizures.  He realized that he was unable to detox at home and subsequently presented to the hospital.  He did have a long period of sobriety for 26 years and has relapsed over the past week or so.  Interested in treatment.    -Management per primary team    -Agree with MSSA, Valium    -Continue on multivitamin, thiamine, folate supplementation.    #Generalized pain  Saroj reports generalized pain which is worse throughout his abdomen, neck, shoulders, anterior chest.  He denies having this feeling with withdrawal in the past and is unsure why he is having so much pain with withdrawal this time.  We discussed obtaining a creatinine kinase level.  This was normal.  In addition, discussed pharmacologic treatment.    -Continue treatment of alcohol withdrawal    -Added gabapentin 3 times daily    -Methocarbamol as needed    -As he does describe some pain in chest area, obtain EKG    #Recent hematemesis, resolved  #Recent hematochezia, resolved  Presented to ED on 3/5/2021 with above complaints in addition to abdominal pain.  He was admitted but later discharged  "AGAINST MEDICAL ADVICE.  Saroj reports that hematemesis and hematochezia have been resolved and he has had no further episodes since the left the hospital on 3/5.    -Recommend adding pantoprazole 40 mg daily (ordered)    #Hypertension  Patient reported to pharmacist that he had been on antihypertensive medication prior and was interested in restarting this.  He was unsure which medication this was, and pharmacy investigation revealed that he had filled losartan 50 mg in June 2020.    -will prescribe losartan - start at 25mg    #Leukocytosis  Very slight leukocytosis with neutrophilia.  No current concern for infection based on physical exam today.  Likely reactive.    -Recommend recheck tomorrow (ordered)    #Thrombocytopenia  Platelet count 148 today.  Previous 207 on 3/5/2021.  Possibly bone marrow suppression due to alcohol use.    -Recheck in a.m.     SUBJECTIVE:  Chief complaint: \"I'm in a lot of pain\"    HPI:  Saroj Summers is a 60 year old male with past medical history as listed above and below who was admitted to [facility] detox unit for evaluation and management of alcohol withdrawal and alcohol use disorder.  His chief medical complaint is generalized pain.  He also reported this complaint when he presented in the ED.  He states that the pain is constant but also has intermittent spasms-feels like muscle spasming, mostly his arms, abdomen, neck, and some across his chest.  He denies any associated symptoms such as shortness of breath, cough, fever, chills.  He states that he has not felt like this during withdrawal before.  He does endorse some seizure activity at home prior to coming to the hospital.  Discussed checking CK level and treating his pain.  He was agreeable to plan.  History was provided by patient and medical record.    Medical History  Saroj has a past medical history of Anxiety, Depression, Diverticulosis of sigmoid colon, and PTSD (post-traumatic stress disorder).      " "Surgical History  He has a past surgical history that includes Laparoscopic appendectomy (N/A, 7/13/2020).       Social History  Reviewed, and he  reports that he quit smoking about 12 years ago. He has a 7.50 pack-year smoking history. His smokeless tobacco use includes snuff. He reports previous alcohol use. He reports that he does not use drugs.       Allergies  Allergies   Allergen Reactions     Nka [No Known Allergies]     Family History  Reviewed, and   Family History   Problem Relation Age of Onset     Cancer - colorectal Father      Alcohol/Drug Father      Cancer Paternal Grandmother      C.A.D. Paternal Grandfather      Neurologic Disorder Son         brain trauma at birth           Prior to Admission Medications  No medications prior to admission.       Review of Systems:  History obtained from chart review and the patient  General ROS: positive for  - fatigue and malaise  Psychological ROS: positive for - anxiety  ENT ROS: negative for - sinus pain or sore throat  Respiratory ROS: no cough, shortness of breath, or wheezing  Cardiovascular ROS: positive for - chest wall pain. Negative for palpitations and negative for radiating chest pains  Gastrointestinal ROS: positive for - abdominal pain (generalized). No bloody stools. No nausea or vomiting.  Musculoskeletal ROS: positive for - muscle pain  Neurological ROS: no TIA or stroke symptoms    Physical Exam  BP (!) 153/89   Pulse 113   Temp 97.7  F (36.5  C) (Temporal)   Resp 16   Ht 1.88 m (6' 2\")   Wt 92.1 kg (203 lb)   SpO2 97%   BMI 26.06 kg/m      Constitutional:  Alert, appears ill, cooperative  EYES:  PERRL  Cardiovascular:   Regular rate and rhythm, no murmurs, rubs or gallops  Chest:  Symmetrical, no retractions  Respiratory:  respiratory effort and palpation of chest normal, lungs clear to auscultation   Gastrointestinal:  Positive bowel sounds, some generalized tenderness. Abdomen mildly distended, no hepatosplenomegaly or other " masses  Musculoskeletal:  Generalized weakness. No edema  Skin:  No concerning lesions to exposed skin, no jaundice  Neurological:  No focal deficits. Tremulousness r/t alcohol withdrawal.    Pertinent Labs   Recent Labs   Lab 03/09/21  1106 03/05/21  0555    138   CO2 25 27   BUN 12 11   ALBUMIN 3.6 3.0*   ALKPHOS 118 87   ALT 31 29   AST 35 22     Recent Labs   Lab 03/09/21  1106 03/05/21  1537 03/05/21  0353 03/05/21  0353   WBC 12.4*  --   --  10.6   HGB 14.0 13.7   < > 14.9   HCT 39.2*  --   --  43.2   *  --   --  207    < > = values in this interval not displayed.     Lab Results: personally reviewed.     Imaging  CT Chest/Abdomen/Pelvis w Contrast  Narrative: EXAM: CT CHEST/ABDOMEN/PELVIS W CONTRAST  LOCATION: [facility]  DATE/TIME: 3/5/2021 12:02 AM    INDICATION: Abdominal pain. Hematemesis. Hemoptysis. Cough.  COMPARISON: 09/05/2020.  TECHNIQUE: CT scan of the chest, abdomen, and pelvis was performed following injection of IV contrast. Multiplanar reformats were obtained. Dose reduction techniques were used.   CONTRAST: 98 mL Isovue 370    FINDINGS:   LUNGS AND PLEURA: Calcified granuloma in the right upper lobe laterally. Dependent atelectasis bilaterally. Band of scar or atelectasis in the inferior lingula. No pneumothorax or pleural effusion.    MEDIASTINUM/AXILLAE: Calcified lymph nodes in the mediastinum and right hilum. No lymph node enlargement. The heart size is normal. Central airways are unremarkable.    CORONARY ARTERY CALCIFICATION: None.    HEPATOBILIARY: Fatty infiltration of the liver. The gallbladder is distended but otherwise appears normal.    PANCREAS: Normal.    SPLEEN: Few small calcified granulomas.    ADRENAL GLANDS: 2.2 cm right adrenal gland nodule without significant interval change.    KIDNEYS/BLADDER: 2.8 cm right renal cortical cyst. The kidneys otherwise appear normal. No hydronephrosis. Urinary bladder is very distended but otherwise appears  normal.    BOWEL: No bowel obstruction or inflammation. Postoperative changes in the right lower quadrant. No free intraperitoneal gas or fluid.    LYMPH NODES: Normal.    VASCULATURE: Atherosclerotic calcification of the aorta and its branches. No aneurysm.    PELVIC ORGANS: Normal.    MUSCULOSKELETAL: Normal.  Impression: IMPRESSION:  1.  No acute abnormality. No bowel obstruction or inflammation.  2.  Old granulomatous disease. No acute chest abnormality.  3.  Fatty infiltration of the liver.    Disposition plan:   Discharge planning per psychiatry.    Total time spent in direct patient care and management is greater than 55 minutes with more than 50% of time spent in coordination of care including time with the patient in face to face contact as well as coordination with staff, medication adjustments, review of previous medical records, and telephone updates.    RODOLFO Rodriguez, CNP, ACNPC-AG  [facility] Medicine Team  [facility] [place]

## 2021-03-10 NOTE — H&P
Saroj Summers is a 60 year old male who was referred by self      History was provided by HECTOR who was a fair historian.   CHIEF COMPLAINT:    Relapse  HISTORY OF PRESENT ILLNESS:    Patient is a 60-year-old  male.  He came to the emergency room wanting detox from alcohol.  He was admitted on 3 5 discharged on the same day AGAINST MEDICAL ADVICE for alcoholism he continued to drink he did have a seizure at that time.    He came to the emergency room because he is drinking a liter he is having anxiety and shakes for coordination he felt like his skin was blotchy  Patient has been using the following substances: Alcohol  Started at age 13, he went to treatment in 1994 he quit for 26 years.  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  He realized he needs to get help and came here.    Patient has tolerance, withdrawal, progressive use, loss of control, spending more time and more amount than intended. Patient has made attempts to quit, is experiencing cravings, and reports negative consequences.              Patient does have a history of seizures.  Patient does have a history of delirium tremens.               Denies thoughts of suicide or harming others.      Denies auditory or visual hallucinations.     Patient smokes 1/2 ppd     Patient denied any gambling    Denied any drug use     PSYCHIATRIC REVIEW OF SYSTEMS:         Psychiatric Review of Systems:   Depression:   Denied : depressed mood, suicidal ideation, decreased interest, changes in sleep, changes in appetite, guilt, hopelessness, helplessness, impaired concentration, decreased energy, irritability.   Denies: depressed mood, suicidal ideation, decreased interest, changes in sleep, changes in appetite, guilt, hopelessness, helplessness,  impaired concentration, decreased energy, irritability.  Debora:   Denied any sleeplessness, impulsiveness, racing thoughts, increased goal-directed activities, pressured speech, increase in energy  Debora Feeling euphoric,Distractible,Impulsive,Grandiose,Talking excessively,Have energy without sleeping,Mood swings,Irritability  Denies: sleeplessness, increased goal-directed activities, abrupt increase in energy, pressured speech  Psychosis:     Denies: visual hallucinations, auditory hallucinations, paranoia  Anxiety:   denied excessive worries that are difficult to control for the past 6 months,   Chronic anxiety , not able to stop worrying impacting sleep, poor conc, irritable , muscle tension    panic attacks sob, heart racing sweaty shaky , nausea    Denies: worries that are difficult to control for the past 6 months, panic attacks  PTSD: flashbacks, trust issues     Denies: re-experiencing past trauma, nightmares, increased arousal, avoidance of traumatic stimuli, impaired function.  OCD:     Denies: obsessions, checking, symmetry, cleaning, skin picking.  ED:     Denies: restriction, binging, purging.    Denied any Symptoms of attention deficit disorder include a failure to pay attention to detail, a pattern of careless mistakes, a pattern of inattentive listening, a failure to follow through with projects, poor personal organization, losing necessary objects, distractibility, forgetfulness.    Denied any Symptoms of borderline personality disorder include a fear of abandonment, unstable self-image, impulsive behavior, dissociative feeling, intense anger, unstable personal relationships, chronic feelings of boredom, periods of intense depressed mood.              PSYCHIATRIC HISTORY     Previous diagnoses:     Alcoholism      Past court commitments: none  SIB /SUICIDE ATTEMPTS NONE  Psych Hosp :none  Outpatient Programs none  [facility]patient cd trt [facility]   Out pt cd trt none    PAST PSYCH MED TRIALS     none      SOCIAL HISTORY                                                                         Unemployed , 2 kids single         Family History:   FAMILY HISTORY:   Family History   Problem Relation Age of Onset     Cancer - colorectal Father      Alcohol/Drug Father      Cancer Paternal Grandmother      C.A.D. Paternal Grandfather      Neurologic Disorder Son         brain trauma at birth     Family Mental Health History-  Brother has ADD               PTA Medications:     No medications prior to admission.          Allergies:     Allergies   Allergen Reactions     Nka [No Known Allergies]           Labs:     Recent Results (from the past 48 hour(s))   Alcohol breath test POCT    Collection Time: 03/09/21 10:38 AM   Result Value Ref Range    Alcohol Breath Test 0.028 (A) 0.00 - 0.01   Comprehensive metabolic panel    Collection Time: 03/09/21 11:06 AM   Result Value Ref Range    Sodium 137 133 - 144 mmol/L    Potassium 3.1 (L) 3.4 - 5.3 mmol/L    Chloride 99 94 - 109 mmol/L    Carbon Dioxide 25 20 - 32 mmol/L    Anion Gap 13 3 - 14 mmol/L    Glucose 205 (H) 70 - 99 mg/dL    Urea Nitrogen 12 7 - 30 mg/dL    Creatinine 0.78 0.66 - 1.25 mg/dL    GFR Estimate >90 >60 mL/min/[1.73_m2]    GFR Estimate If Black >90 >60 mL/min/[1.73_m2]    Calcium 9.4 8.5 - 10.1 mg/dL    Bilirubin Total 0.9 0.2 - 1.3 mg/dL    Albumin 3.6 3.4 - 5.0 g/dL    Protein Total 6.7 (L) 6.8 - 8.8 g/dL    Alkaline Phosphatase 118 40 - 150 U/L    ALT 31 0 - 70 U/L    AST 35 0 - 45 U/L   CBC with platelets differential    Collection Time: 03/09/21 11:06 AM   Result Value Ref Range    WBC 12.4 (H) 4.0 - 11.0 10e9/L    RBC Count 4.38 (L) 4.4 - 5.9 10e12/L    Hemoglobin 14.0 13.3 - 17.7 g/dL    Hematocrit 39.2 (L) 40.0 - 53.0 %    MCV 90 78 - 100 fl    MCH 32.0 26.5 - 33.0 pg    MCHC 35.7 31.5 - 36.5 g/dL    RDW 13.4 10.0 - 15.0 %    Platelet Count 148 (L) 150 - 450 10e9/L    Diff Method Automated Method     % Neutrophils 78.4 %    % Lymphocytes 11.9 %  "   % Monocytes 8.6 %    % Eosinophils 0.2 %    % Basophils 0.4 %    % Immature Granulocytes 0.5 %    Nucleated RBCs 0 0 /100    Absolute Neutrophil 9.7 (H) 1.6 - 8.3 10e9/L    Absolute Lymphocytes 1.5 0.8 - 5.3 10e9/L    Absolute Monocytes 1.1 0.0 - 1.3 10e9/L    Absolute Eosinophils 0.0 0.0 - 0.7 10e9/L    Absolute Basophils 0.1 0.0 - 0.2 10e9/L    Abs Immature Granulocytes 0.1 0 - 0.4 10e9/L    Absolute Nucleated RBC 0.0    Asymptomatic Influenza A/B & SARS-CoV2 (COVID-19) Virus PCR Multiplex    Collection Time: 03/09/21 11:26 AM    Specimen: Nasopharyngeal   Result Value Ref Range    Flu A/B & SARS-COV-2 PCR Source Nasopharyngeal     SARS-CoV-2 PCR Result NEGATIVE     Influenza A PCR Negative NEG^Negative    Influenza B PCR Negative NEG^Negative    Respiratory Syncytial Virus PCR (Note)     Flu A/B & SARS-CoV-2 PCR Comment (Note)    Drug abuse screen 6 urine (tox)    Collection Time: 03/09/21 12:49 PM   Result Value Ref Range    Amphetamine Qual Urine Positive (A) NEG^Negative    Barbiturates Qual Urine Negative NEG^Negative    Benzodiazepine Qual Urine Positive (A) NEG^Negative    Cannabinoids Qual Urine Negative NEG^Negative    Cocaine Qual Urine Positive (A) NEG^Negative    Ethanol Qual Urine Negative NEG^Negative    Opiates Qualitative Urine Negative NEG^Negative   Potassium    Collection Time: 03/10/21  6:50 AM   Result Value Ref Range    Potassium 3.8 3.4 - 5.3 mmol/L   GGT    Collection Time: 03/10/21  6:50 AM   Result Value Ref Range     (H) 0 - 75 U/L   CK total    Collection Time: 03/10/21  6:50 AM   Result Value Ref Range    CK Total 243 30 - 300 U/L   Lipase    Collection Time: 03/10/21  6:50 AM   Result Value Ref Range    Lipase 306 73 - 393 U/L         BP (!) 151/94   Pulse 68   Temp 97.8  F (36.6  C) (Temporal)   Resp 16   Ht 1.88 m (6' 2\")   Wt 92.1 kg (203 lb)   SpO2 100%   BMI 26.06 kg/m    Weight is 203 lbs 0 oz  Body mass index is 26.06 kg/m .    Physical Exam:     ROS: 10 point " ROS neg other than the symptoms noted above in the HPI.            Past Medical History:   PAST MEDICAL HISTORY:   Past Medical History:   Diagnosis Date     Anxiety      Depression      Diverticulosis of sigmoid colon      PTSD (post-traumatic stress disorder)        PAST SURGICAL HISTORY:   Past Surgical History:   Procedure Laterality Date     LAPAROSCOPIC APPENDECTOMY N/A 7/13/2020    Procedure: Laparoscopic Appendectomy;  Surgeon: Valeriano Vega DO;  Location: [facility]       -    -           MENTAL STATUS EXAM:      Constitutional: General appearance of patient:      Appearance:  awake, alert, appeared as age stated, adequate groomed and slightly unkempt  Attitude:  cooperative  Eye Contact:  good  Mood:  good, anxious   Affect:  congruent   Speech:  clear, coherent normal rate   Psychomotor Behavior:  no evidence of tardive dyskinesia, dystonia, or tics  Thought Process:  logical, linear and goal oriented  Associations:  no loose associations  Thought Content:  no evidence of psychotic thought and active suicidal ideation present  Denied any active suicidal /homicidation ideation plan intent   Insight:  fair  Judgment:  fair  Oriented to:  time, person, and place  Attention Span and Concentration:  intact  Recent and Remote Memory:  intact  Language:  english with appropriate syntax and vocabulary  Fund of Knowledge: appropriate  Muscle Strength and Tone: normal  Gait and Station: Normal     There are no abnormal or psychotic thoughts, no preoccupations, no overvalued ideas, no rumination, no obsessions, no compulsions, no somatic concerns, no hypochrondriasis, no ideas of reference, and no delusions.  Patient denies homicidal thoughts.   Patient denies suicidal thoughts.  Patient appears to have good judgment and good insight.     Musculoskeletal: Patient shows no abnormalities of motor activity: there is no tremor, no tic, and no dystonia.  There is no apparent muscle atrophy, strength and tone appear  normal, and there are no abnormal movements.  Patient has normal gait and stance.    DISCUSSION:         Assessment:   Inpatient psychiatric hospitalization is warranted at this time for safety, stabilization, and possible adjustment in medications.    Patient has a biological predisposition with family history positive for alcoholism in his father and ADHD in his brother  Psychologically patient is experiencing alcohol use disorder severe with tolerance withdrawal progress loss of control tried to quit unsuccessfully  Patient has these particular stressors dealing with his children 1 who has traumatic brain injury and needing surgery and the other one who came back  having multiple injury lacks sober support he tried to quit but when hospitalized he left he left AMA on 3/5/2021  Patient has chronic illness exacerbation leading to hospitalization progression as described.     Patient has been unable to stop using drugs in the community due to both physical and psychological symptoms.  Continued use will put the patient at risk for medical and/or psychiatric complications.       Diagnoses:       Alcohol use disorder severe  Alcohol alcohol dependence  Alcohol withdrawal severe         Plan:   Problem list  1#alcohol use disorder severe alcohol withdrawal severe     - [facility] protocol using Valium for management of alcohol withdrawal  Patient had a recent seizure his blood pressure is 151/94 he has eating disturbance tremor proximal sweats elevated pulse he scored 8 required 20 mg of diazepam since his admission is required 60 mg of diazepam and 3 mg of Ativan  - Continue thiamine, folate, and multivitamin daily    2#hypokalemia replaced in the emergency room now is normal    3#thrombocytopenia most likely from alcoholism bone marrow suppression platelets are 148  4#p patient's urine drug screen is positive for cocaine  We will monitor in detox and medically  5#patient's urine drug screen is also positive for  amphetamines  Is positive for benzodiazepines as well but patient was recently in detox but left AMA      - Consider anti-craving medications prior to discharge. Pt willing to review additional information about both naltrexone and Antabuse.    Alcohol withdrawal nausea prn Zofran as needed for nausea     hydroxyzine 25 mg q4h prn for acute anxiety  Trazodone 50 mg at bedtime prn for sleep disturbances       Patient has been unable to stop using drugs in the community due to both physical and psychological symptoms.  Continued use will put the patient at risk for medical and/or psychiatric complications.    I HAVE REVIEWED LABS WITH PT AND TALKED ABOUT RESULTS WITH PT  I HAVE REVIEWED AND SUMMARIZED OLD RECORDS including his medication reconcilation of his home medications  and PDMP   I HAVE SPOKEN WITH RN ABOUT MEDICATIONS AND DETOX SCORES  I HAVE SPOKEN WITH CM ABOUT PTS TREATMETN OPTIONS   Laboratory/Imaging:    Liver Function Studies -   Recent Labs   Lab Test 03/09/21  1106   PROTTOTAL 6.7*   ALBUMIN 3.6   BILITOTAL 0.9   ALKPHOS 118   AST 35   ALT 31      Last Comprehensive Metabolic Panel:  Sodium   Date Value Ref Range Status   03/09/2021 137 133 - 144 mmol/L Final     Potassium   Date Value Ref Range Status   03/10/2021 3.8 3.4 - 5.3 mmol/L Final     Chloride   Date Value Ref Range Status   03/09/2021 99 94 - 109 mmol/L Final     Carbon Dioxide   Date Value Ref Range Status   03/09/2021 25 20 - 32 mmol/L Final     Anion Gap   Date Value Ref Range Status   03/09/2021 13 3 - 14 mmol/L Final     Glucose   Date Value Ref Range Status   03/09/2021 205 (H) 70 - 99 mg/dL Final     Urea Nitrogen   Date Value Ref Range Status   03/09/2021 12 7 - 30 mg/dL Final     Creatinine   Date Value Ref Range Status   03/09/2021 0.78 0.66 - 1.25 mg/dL Final     GFR Estimate   Date Value Ref Range Status   03/09/2021 >90 >60 mL/min/[1.73_m2] Final     Comment:     Non  GFR Calc  Starting 12/18/2018, serum  "creatinine based estimated GFR (eGFR) will be   calculated using the Chronic Kidney Disease Epidemiology Collaboration   (CKD-EPI) equation.       Calcium   Date Value Ref Range Status   03/09/2021 9.4 8.5 - 10.1 mg/dL Final     Bilirubin Total   Date Value Ref Range Status   03/09/2021 0.9 0.2 - 1.3 mg/dL Final     Alkaline Phosphatase   Date Value Ref Range Status   03/09/2021 118 40 - 150 U/L Final     ALT   Date Value Ref Range Status   03/09/2021 31 0 - 70 U/L Final     AST   Date Value Ref Range Status   03/09/2021 35 0 - 45 U/L Final                   Medical treatment/interventions:  Medical concerns: As above    - Consults: IM consult placed. Appreciate assistance.     Legal Status: Voluntary     Safety Assessment:   Checks: Status 15  Pt has not required locked seclusion or restraints in the past 24 hours to maintain safety, please refer to RN documentation for further details.    The risks, benefits, alternatives and side effects have been discussed and are understood by the patient.       Patient will be treated in therapeutic milieu with appropriate individual and group therapies as described.  Disposition: Pending clinical stabilization. Pt does  appear interested in CD treatment        \"Much or all of the text in this note was generated through the use of Dragon Dictate voice to text software. Errors in spelling or words which appear to be out of contact are unintentional, may be present due having escaped editing\"     "

## 2021-03-10 NOTE — PLAN OF CARE
"Pt MSSA score today is an 11, 10 mg po valium administered. Pt denies suicidal ideation plan or intent. He does report anxiety and depression both rated 10 of 10 in severity. States was clean and sober for 26 years until the relapse occurred. He states feeling some of his drinking is reported to the anxiety and depression. States he raised two kids \"one of them is autistic\" and that adds to his anxiety along with feeling covid, and the isolation of that, was part of the reason for his relapse. Stayed sober by going to treatment and then participating in AA. States zoom meetings \"aren't the same\" as in person meetings.     Is reporting some abdominal area pain but states \"it's not the stomach. Will defer to internal medicine on this issue. Will continue to monitor and intervene as warranted.  "

## 2021-03-10 NOTE — PLAN OF CARE
"HOWARD    Saroj Summers is a 60 year old year old male with a chief complaint of Alcohol Problem (Requesting detox for ETOH. Drinks 1 liter daily. Last drink: yesterday around 10 pm. Reports hx of seizures, last seizure was 5 days ago. )    S = Situation:   Admit  B  = Background:   Pt admitted for alcohol withdrawal.  Pt reports drinking daily for the last 5 days, a liter or more of hard liquor.  Prior this this he was at [facility] where he was worked up for a GI bleed and hepatic encephalopathy.  Pt left AMA after sobering up.  Pt now requested admission for detox and assistance in getting to treatment.  Pt reports that he cares for his elderly mother and his autistic spectrum brother, otherwise he is homeless.  Pt says that he was sober for 25 years and relapsed a year ago--he went to a program in [place] that wasn't very helpful.    A  =  Assessment:   Vital Signs: BP (!) 152/91   Pulse 99   Temp 98.3  F (36.8  C) (Temporal)   Resp 16   Ht 1.88 m (6' 2\")   Wt 92.1 kg (203 lb)   SpO2 94%   BMI 26.06 kg/m    Alert and oriented X 3, no SI, no SIB.  Pt is tremulous, diaphoretic and anxious.  He is cooperative and help seeking.  He affect is flat, his mood is anxious.  He reports that he feels bad about his relapse, he wishes he could stop drinking but he hasn't been able to do it on his own.  He is interested in attending [facility].  R =   Request or Recommendation:   Alcohol withdrawal monitoring, Dr. Rolle to see, case management to see, medicine to evaluate     "

## 2021-03-10 NOTE — PROGRESS NOTES
"Met with pt to discuss aftercare plans. Pt reports he is interested in treatment in [facility] Pt completed his paperwork and was sent to remote  for review. Pt currently appears to be in moderate withdrawal, would benefit from a day of rest then completing assessment on 3/11. Pt has [employer] LEE for insurance. Pt reports he was sober for 26 years then relapsed 1 year prior. Reports he went to treatment at \"[facility] back in the glory days.\" AVS initiated, pt will be assessed and referred for [facility]   "

## 2021-03-11 LAB
CA-I BLD-MCNC: 4.7 MG/DL (ref 4.4–5.2)
INTERPRETATION ECG - MUSE: NORMAL
MAGNESIUM SERPL-MCNC: 1.8 MG/DL (ref 1.6–2.3)
PLATELET # BLD AUTO: 141 10E9/L (ref 150–450)
POTASSIUM SERPL-SCNC: 4.1 MMOL/L (ref 3.4–5.3)
WBC # BLD AUTO: 6.7 10E9/L (ref 4–11)

## 2021-03-11 PROCEDURE — 99231 SBSQ HOSP IP/OBS SF/LOW 25: CPT | Performed by: PSYCHIATRY & NEUROLOGY

## 2021-03-11 PROCEDURE — 83735 ASSAY OF MAGNESIUM: CPT | Performed by: NURSE PRACTITIONER

## 2021-03-11 PROCEDURE — 36415 COLL VENOUS BLD VENIPUNCTURE: CPT | Performed by: NURSE PRACTITIONER

## 2021-03-11 PROCEDURE — 82330 ASSAY OF CALCIUM: CPT | Performed by: NURSE PRACTITIONER

## 2021-03-11 PROCEDURE — 250N000013 HC RX MED GY IP 250 OP 250 PS 637: Performed by: NURSE PRACTITIONER

## 2021-03-11 PROCEDURE — 85049 AUTOMATED PLATELET COUNT: CPT | Performed by: NURSE PRACTITIONER

## 2021-03-11 PROCEDURE — 84132 ASSAY OF SERUM POTASSIUM: CPT | Performed by: NURSE PRACTITIONER

## 2021-03-11 PROCEDURE — H0001 ALCOHOL AND/OR DRUG ASSESS: HCPCS

## 2021-03-11 PROCEDURE — 128N000004 HC R&B CD ADULT

## 2021-03-11 PROCEDURE — H2032 ACTIVITY THERAPY, PER 15 MIN: HCPCS

## 2021-03-11 PROCEDURE — 85048 AUTOMATED LEUKOCYTE COUNT: CPT | Performed by: NURSE PRACTITIONER

## 2021-03-11 RX ADMIN — DIAZEPAM 10 MG: 5 TABLET ORAL at 04:20

## 2021-03-11 RX ADMIN — GABAPENTIN 600 MG: 300 CAPSULE ORAL at 08:18

## 2021-03-11 RX ADMIN — NICOTINE POLACRILEX 2 MG: 2 GUM, CHEWING ORAL at 16:28

## 2021-03-11 RX ADMIN — FOLIC ACID 1 MG: 1 TABLET ORAL at 08:18

## 2021-03-11 RX ADMIN — NICOTINE POLACRILEX 4 MG: 2 GUM, CHEWING ORAL at 13:24

## 2021-03-11 RX ADMIN — GABAPENTIN 600 MG: 300 CAPSULE ORAL at 13:22

## 2021-03-11 RX ADMIN — GABAPENTIN 600 MG: 300 CAPSULE ORAL at 20:13

## 2021-03-11 RX ADMIN — ATENOLOL 50 MG: 50 TABLET ORAL at 16:28

## 2021-03-11 RX ADMIN — DOCUSATE SODIUM 50MG AND SENNOSIDES 8.6MG 1 TABLET: 8.6; 5 TABLET, FILM COATED ORAL at 22:29

## 2021-03-11 RX ADMIN — NICOTINE POLACRILEX 4 MG: 2 GUM, CHEWING ORAL at 10:32

## 2021-03-11 RX ADMIN — PANTOPRAZOLE SODIUM 40 MG: 40 TABLET, DELAYED RELEASE ORAL at 11:28

## 2021-03-11 RX ADMIN — NICOTINE 1 PATCH: 21 PATCH, EXTENDED RELEASE TRANSDERMAL at 16:28

## 2021-03-11 RX ADMIN — NICOTINE POLACRILEX 4 MG: 2 GUM, CHEWING ORAL at 20:13

## 2021-03-11 RX ADMIN — NICOTINE POLACRILEX 4 MG: 2 GUM, CHEWING ORAL at 11:28

## 2021-03-11 RX ADMIN — THIAMINE HCL TAB 100 MG 100 MG: 100 TAB at 08:18

## 2021-03-11 RX ADMIN — NICOTINE 1 PATCH: 21 PATCH, EXTENDED RELEASE TRANSDERMAL at 08:18

## 2021-03-11 RX ADMIN — LOSARTAN POTASSIUM 25 MG: 25 TABLET, FILM COATED ORAL at 08:18

## 2021-03-11 RX ADMIN — NICOTINE POLACRILEX 2 MG: 2 GUM, CHEWING ORAL at 16:32

## 2021-03-11 RX ADMIN — MULTIPLE VITAMINS W/ MINERALS TAB 1 TABLET: TAB at 08:18

## 2021-03-11 ASSESSMENT — ACTIVITIES OF DAILY LIVING (ADL)
DRESS: STREET CLOTHES
ORAL_HYGIENE: INDEPENDENT
LAUNDRY: WITH SUPERVISION
HYGIENE/GROOMING: INDEPENDENT

## 2021-03-11 NOTE — PLAN OF CARE
Problem: Substance Withdrawal  Intervention: Substance Withdrawal  Recent Flowsheet Documentation  Taken 3/11/2021 1100 by Adie Tuttle RN  Substance Withdrawal Interventions:   interventions implemented as appropriate   monitor substance withdrawal process   seizure precautions   encourage nutrition and hydration   maintain safety precautions   assess patient response to medication  Alcohol Withdrawl Interventions:   monitor need for prn medication   provide emotional support   establish therapeutic relationship   assist with developing & utilizing healthy coping strategies   build upon strengths       No medications this shift for alcohol withdrawal. Pt has received a total of 105 mg of valium since admission. No oversedation noted.     Pt out on unit. Pt completed phone assessment with .     Pt mood is stable.     Pt is excited as he may be discharge to [facility].

## 2021-03-11 NOTE — PROGRESS NOTES
"Pt out on unit attending unit programming.   Appetite good.   Pt medicated x 2 with valium 10 mg.   Pt has received a total of 65 mg of valium since admission.     No oversedation noted.   Pt reports his anxiety level a 7 and his depression level a 8-9 on a 0-10 severe scale.     Pt denies SI.     States he is mostly depressed stating \"About the situation I got myself into. 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     Reporting generalized pain. Robaxin prn given.   "

## 2021-03-11 NOTE — PROGRESS NOTES
Pt completed comprehensive assessment, bed saved in [facility] on 3/12/21, time TBD. [facility] placement summary completed and faxed to utilization review at [phone] for funding authorization. Pt notified he can admit on 3/12. AVS updated.

## 2021-03-11 NOTE — PROGRESS NOTES
[facility], [place]   Psychiatric Progress Note        Interim history   This is a 60 year old male with Alcohol use disorder severe  Alcohol alcohol dependence  Alcohol withdrawal severe.Pt seen in rounds.   The patient's care was discussed with the treatment team during the daily team meeting and/or staff's chart notes were reviewed.  Staff report patient has been visible in the milieu,  no acute eventsovernight.     Patient's mood is anxious  Energy Level:MODERATE  Sleep:No concerns, sleeps well through night  Appetite:fair improving  motivation interest    denied any Suicidal/homicidal ideation/plan intent.  Denied any psychosis  No prior suicde attempts  No access to gun  Pt is in alcohol withdrawal still being monitered every 4 hrs for it,   Pt mssa score are monitered  Tolerating meds and has no side effects.              Medications:     Current Facility-Administered Medications   Medication     acetaminophen (TYLENOL) tablet 325-650 mg     alum & mag hydroxide-simethicone (MAALOX) suspension 30 mL     atenolol (TENORMIN) tablet 50 mg     cloNIDine (CATAPRES) tablet 0.1 mg     diazepam (VALIUM) tablet 5-20 mg     folic acid (FOLVITE) tablet 1 mg     gabapentin (NEURONTIN) capsule 600 mg     hydrOXYzine (ATARAX) tablet 25 mg     losartan (COZAAR) tablet 25 mg     magnesium hydroxide (MILK OF MAGNESIA) suspension 30 mL     methocarbamol (ROBAXIN) tablet 500 mg     multivitamin w/minerals (THERA-VIT-M) tablet 1 tablet     nicotine (NICODERM CQ) 21 MG/24HR 24 hr patch 1 patch     nicotine (NICORETTE) gum 2-4 mg     nicotine Patch in Place     ondansetron (ZOFRAN-ODT) ODT tab 4 mg     pantoprazole (PROTONIX) EC tablet 40 mg     senna-docusate (SENOKOT-S/PERICOLACE) 8.6-50 MG per tablet 1 tablet     thiamine (B-1) tablet 100 mg     traZODone (DESYREL) tablet 50 mg             Allergies:     Allergies   Allergen Reactions     Nka [No Known Allergies]             Psychiatric Examination:  "  Blood pressure (!) 146/85, pulse 85, temperature 97.3  F (36.3  C), temperature source Temporal, resp. rate 16, height 1.88 m (6' 2\"), weight 92.1 kg (203 lb), SpO2 97 %.  Weight is 203 lbs 0 oz  Body mass index is 26.06 kg/m .    Appearance:  awake, alert and adequately groomed  Attitude:  cooperative  Eye Contact:  good  Mood:  better  Affect:  appropriate and in normal range and mood congruent  Speech:  clear, coherent rate /rhythm are good  Psychomotor Behavior:  no evidence of tardive dyskinesia, dystonia, or tics and intact station, gait and muscle tone  Throught Process:  logical  Associations:  no loose associations  Thought Content:  no evidence of suicidal ideation or homicidal ideation, no evidence of psychotic thought, no auditory hallucinations present and no visual hallucinations present  Insight:  good  Judgement:  intact  Oriented to:  time, person, and place  Attention Span and Concentration:  intact  Recent and Remote Memory:  intact  Language fund of knowledge are adequate         Labs:     Lab Results   Component Value Date    NTBNPI 23 01/29/2011     Lab Results   Component Value Date    WBC 6.7 03/11/2021    HGB 14.0 03/09/2021    HCT 39.2 (L) 03/09/2021    MCV 90 03/09/2021     (L) 03/11/2021     Lab Results   Component Value Date    TSH 2.14 06/10/2019         DX    Alcohol use disorder severe  Alcohol alcohol dependence  Alcohol withdrawal severe   PLAN   Alcohol intoxication/withdrawal, [facility] as ordered. Continue thiamine 100 mg p.o. daily, M.V.I. one p.o. daily and folate 1 mg p.o. Daily  Will continue mssa protocal to detox off alcohol on valium,  Pt is c/o of termor , agitation poor sleep and poor appetite, he has sweats, feels shakey  On mssa client scored scored 7today and  needed needed 0 mg po as of yet , total dose since admission was 90    [facility]    Eating Disturbances: ate and enjoyed all of it or not " applicable  Tremor: 1 - not visibly apparent but can be felt by the examiner placing his fingertip slightly against the patient's fingertips  Sleep Disturbance: slept through the night or not applicable  Clouding of Sensorium: no evidence  Hallucinations: 0 - none  Quality of Contact: 0 - awareness of examiner and people around him/her  Agitation: 1 - somewhat more than normal activity  Paroxysmal Sweats: 0 - no observed sweating  Temperature: 99.5 or below  Pulse: 4 - 100 to 109  Total MSSA Score: 7    Laboratory/Imaging: reviewed with patient   Consults: internal medicine consult reviewed  Patient will be treated in therapeutic milieu with appropriate individual and group therapies as described.  PDMP CHECKED     Supportive psychotheraoy provided, jacinto talked about recovery enviroment, relapse prevention, triggers to use.  Discussed with patient many issues of addiction,triggers, relapse, and establishing a solid recovery program.  Asked pt to be med complinat   Medical diagnoses to be addressed this admission:    Plan:  Discussion: Saroj is a 60 year old male with past medical history including hypertension, hepatic steatosis, methamphetamine use, cocaine use, alcohol use disorder, adrenal mass, and recent hematemesis with hematochezia who presented to ED for detox.  Hospital medicine consultation for co-management of detox.     #Alcohol use disorder  #Alcohol withdrawal  Saroj reported alcohol use 1 L/day to emergency department physician.  Reported last drink on 3/8 around 2200.  He does have a history of alcohol withdrawal seizures and delirium tremens.  He states that he was trying to cut back on alcohol by himself at home and experienced seizures. At that point, he reports that he consumed more alcohol to stop the seizures.  He realized that he was unable to detox at home and subsequently presented to the hospital.  He did have a long period of sobriety for 26 years and has relapsed over the past week or  so.  Interested in treatment.    -Management per primary team    -Agree with CHANTALE, Valium    -Continue on multivitamin, thiamine, folate supplementation.     #Generalized pain  Saroj reports generalized pain which is worse throughout his abdomen, neck, shoulders, anterior chest.  He denies having this feeling with withdrawal in the past and is unsure why he is having so much pain with withdrawal this time.  We discussed obtaining a creatinine kinase level.  This was normal.  In addition, discussed pharmacologic treatment.    -Continue treatment of alcohol withdrawal    -Added gabapentin 3 times daily    -Methocarbamol as needed    -As he does describe some pain in chest area, obtain EKG     #Recent hematemesis, resolved  #Recent hematochezia, resolved  Presented to ED on 3/5/2021 with above complaints in addition to abdominal pain.  He was admitted but later discharged AGAINST MEDICAL ADVICE.  Saroj reports that hematemesis and hematochezia have been resolved and he has had no further episodes since the left the hospital on 3/5.    -Recommend adding pantoprazole 40 mg daily (ordered)     #Hypertension  Patient reported to pharmacist that he had been on antihypertensive medication prior and was interested in restarting this.  He was unsure which medication this was, and pharmacy investigation revealed that he had filled losartan 50 mg in June 2020.    -will prescribe losartan - start at 25mg     #Leukocytosis  Very slight leukocytosis with neutrophilia.  No current concern for infection based on physical exam today.  Likely reactive.    -Recommend recheck tomorrow (ordered)     #Thrombocytopenia  Platelet count 148 today.  Previous 207 on 3/5/2021.  Possibly bone marrow suppression due to alcohol use.    -Recheck in a.m.    Legal Status: voluntary    Safety Assessment:   Checks:  15 min  Precautions: withdrawal precautions  Pt has not required locked seclusion or restraints in the past 24 hours to maintain  safety, please refer to RN documentation for further details.  Discussed with patient many issues of addiction,triggers, relapse, and establishing a solid recovery program.  Able to give informed consent:  YES   Discussed Risks/Benefits/Side Effects/Alternatives: YES    After discussion of the indications, risks, benefits, alternatives and consequences of no treatment, the patient elects to
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
## 2021-03-11 NOTE — PLAN OF CARE
Problem: Substance Withdrawal  Goal: Substance Withdrawal  Description: Signs and symptoms of listed problems will be absent or manageable.  Outcome: Improving     Patient's MSSA scores were 5 and 9. Patient received prn Valium 10 mg x 1. Otherwise, patient appeared to be asleep during safety checks. Will continue to monitor and update if there are changes.

## 2021-03-12 ENCOUNTER — TELEPHONE (OUTPATIENT)
Dept: ADDICTION MEDICINE | Facility: CLINIC | Age: 61
End: 2021-03-12

## 2021-03-12 ENCOUNTER — HOSPITAL ENCOUNTER (OUTPATIENT)
Dept: BEHAVIORAL HEALTH | Facility: CLINIC | Age: 61
End: 2021-03-12
Attending: FAMILY MEDICINE
Payer: COMMERCIAL

## 2021-03-12 VITALS
SYSTOLIC BLOOD PRESSURE: 163 MMHG | TEMPERATURE: 97.6 F | HEIGHT: 74 IN | RESPIRATION RATE: 16 BRPM | BODY MASS INDEX: 26.05 KG/M2 | DIASTOLIC BLOOD PRESSURE: 91 MMHG | HEART RATE: 76 BPM | WEIGHT: 203 LBS | OXYGEN SATURATION: 97 %

## 2021-03-12 VITALS — TEMPERATURE: 97.9 F | OXYGEN SATURATION: 95 %

## 2021-03-12 PROBLEM — F19.20 CHEMICAL DEPENDENCY (H): Status: ACTIVE | Noted: 2021-03-12

## 2021-03-12 PROCEDURE — 1002N00001 HC LODGING PLUS FACILITY CHARGE ADULT

## 2021-03-12 PROCEDURE — 99239 HOSP IP/OBS DSCHRG MGMT >30: CPT | Performed by: PSYCHIATRY & NEUROLOGY

## 2021-03-12 PROCEDURE — 250N000013 HC RX MED GY IP 250 OP 250 PS 637: Performed by: NURSE PRACTITIONER

## 2021-03-12 RX ORDER — METHOCARBAMOL 500 MG/1
500 TABLET, FILM COATED ORAL 4 TIMES DAILY PRN
Qty: 30 TABLET | Refills: 1 | Status: SHIPPED | OUTPATIENT
Start: 2021-03-12

## 2021-03-12 RX ORDER — GABAPENTIN 300 MG/1
600 CAPSULE ORAL 3 TIMES DAILY
Qty: 90 CAPSULE | Refills: 0 | Status: SHIPPED | OUTPATIENT
Start: 2021-03-12

## 2021-03-12 RX ORDER — MAGNESIUM HYDROXIDE/ALUMINUM HYDROXICE/SIMETHICONE 120; 1200; 1200 MG/30ML; MG/30ML; MG/30ML
30 SUSPENSION ORAL EVERY 6 HOURS PRN
COMMUNITY
End: 2021-04-02

## 2021-03-12 RX ORDER — IBUPROFEN 200 MG
400 TABLET ORAL EVERY 6 HOURS PRN
COMMUNITY
End: 2021-04-02

## 2021-03-12 RX ORDER — AMOXICILLIN 250 MG
2 CAPSULE ORAL DAILY PRN
COMMUNITY
End: 2021-04-02

## 2021-03-12 RX ORDER — LANOLIN ALCOHOL/MO/W.PET/CERES
3 CREAM (GRAM) TOPICAL
COMMUNITY
End: 2021-04-02

## 2021-03-12 RX ORDER — ACETAMINOPHEN 325 MG/1
325-650 TABLET ORAL EVERY 4 HOURS PRN
COMMUNITY
End: 2021-04-02

## 2021-03-12 RX ORDER — LOSARTAN POTASSIUM 50 MG/1
50 TABLET ORAL DAILY
Status: DISCONTINUED | OUTPATIENT
Start: 2021-03-12 | End: 2021-03-12 | Stop reason: HOSPADM

## 2021-03-12 RX ORDER — LORATADINE 10 MG/1
10 TABLET ORAL DAILY PRN
COMMUNITY
End: 2021-04-02

## 2021-03-12 RX ORDER — PANTOPRAZOLE SODIUM 40 MG/1
40 TABLET, DELAYED RELEASE ORAL
Qty: 30 TABLET | Refills: 0 | Status: SHIPPED | OUTPATIENT
Start: 2021-03-12

## 2021-03-12 RX ORDER — LANOLIN ALCOHOL/MO/W.PET/CERES
100 CREAM (GRAM) TOPICAL DAILY
Qty: 30 TABLET | Refills: 0 | Status: SHIPPED | OUTPATIENT
Start: 2021-03-13

## 2021-03-12 RX ORDER — TRAZODONE HYDROCHLORIDE 50 MG/1
50 TABLET, FILM COATED ORAL
Qty: 30 TABLET | Refills: 0 | Status: SHIPPED | OUTPATIENT
Start: 2021-03-12

## 2021-03-12 RX ORDER — HYDROXYZINE HYDROCHLORIDE 25 MG/1
25 TABLET, FILM COATED ORAL EVERY 8 HOURS PRN
Qty: 30 TABLET | Refills: 0 | Status: SHIPPED | OUTPATIENT
Start: 2021-03-12

## 2021-03-12 RX ORDER — MULTIPLE VITAMINS W/ MINERALS TAB 9MG-400MCG
1 TAB ORAL DAILY
Qty: 30 TABLET | Refills: 0 | Status: SHIPPED | OUTPATIENT
Start: 2021-03-13

## 2021-03-12 RX ORDER — NICOTINE 21 MG/24HR
1 PATCH, TRANSDERMAL 24 HOURS TRANSDERMAL DAILY
Qty: 30 PATCH | Refills: 0 | Status: SHIPPED | OUTPATIENT
Start: 2021-03-13

## 2021-03-12 RX ORDER — LOSARTAN POTASSIUM 50 MG/1
50 TABLET ORAL DAILY
Qty: 30 TABLET | Refills: 0 | Status: SHIPPED | OUTPATIENT
Start: 2021-03-13

## 2021-03-12 RX ADMIN — MULTIPLE VITAMINS W/ MINERALS TAB 1 TABLET: TAB at 09:11

## 2021-03-12 RX ADMIN — THIAMINE HCL TAB 100 MG 100 MG: 100 TAB at 09:11

## 2021-03-12 RX ADMIN — NICOTINE POLACRILEX 4 MG: 2 GUM, CHEWING ORAL at 12:33

## 2021-03-12 RX ADMIN — NICOTINE POLACRILEX 4 MG: 2 GUM, CHEWING ORAL at 10:00

## 2021-03-12 RX ADMIN — PANTOPRAZOLE SODIUM 40 MG: 40 TABLET, DELAYED RELEASE ORAL at 09:07

## 2021-03-12 RX ADMIN — FOLIC ACID 1 MG: 1 TABLET ORAL at 09:08

## 2021-03-12 RX ADMIN — NICOTINE 1 PATCH: 21 PATCH, EXTENDED RELEASE TRANSDERMAL at 09:05

## 2021-03-12 RX ADMIN — LOSARTAN POTASSIUM 50 MG: 50 TABLET, FILM COATED ORAL at 09:05

## 2021-03-12 RX ADMIN — GABAPENTIN 600 MG: 300 CAPSULE ORAL at 09:08

## 2021-03-12 ASSESSMENT — PATIENT HEALTH QUESTIONNAIRE - PHQ9: SUM OF ALL RESPONSES TO PHQ QUESTIONS 1-9: 23

## 2021-03-12 ASSESSMENT — ANXIETY QUESTIONNAIRES
1. FEELING NERVOUS, ANXIOUS, OR ON EDGE: NEARLY EVERY DAY
3. WORRYING TOO MUCH ABOUT DIFFERENT THINGS: NEARLY EVERY DAY
IF YOU CHECKED OFF ANY PROBLEMS ON THIS QUESTIONNAIRE, HOW DIFFICULT HAVE THESE PROBLEMS MADE IT FOR YOU TO DO YOUR WORK, TAKE CARE OF THINGS AT HOME, OR GET ALONG WITH OTHER PEOPLE: EXTREMELY DIFFICULT
GAD7 TOTAL SCORE: 21
4. TROUBLE RELAXING: NEARLY EVERY DAY
7. FEELING AFRAID AS IF SOMETHING AWFUL MIGHT HAPPEN: NEARLY EVERY DAY
5. BEING SO RESTLESS THAT IT IS HARD TO SIT STILL: NEARLY EVERY DAY
2. NOT BEING ABLE TO STOP OR CONTROL WORRYING: NEARLY EVERY DAY
6. BECOMING EASILY ANNOYED OR IRRITABLE: NEARLY EVERY DAY
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
## 2021-03-12 NOTE — PLAN OF CARE
Problem: Substance Withdrawal  Goal: Substance Withdrawal  Description: Signs and symptoms of listed problems will be absent or manageable.  Outcome: Improving     Pt scored a 5 and a 2 on the MSSA, did not receive any medication for his withdrawals during the evening shift. Pt will be OOD after his 0000 vitals check if he does not need any more medication.     At 1600 pt had a heart rate of 102 bpm, atenolol was given, heart rate at 2000 was 75 bpm.    Pt participated in both art therapy and watching detox videos in the milieu. Pt state he was feeling much better today.     Pt rated his anxiety and depression a 3/10, denies SI.

## 2021-03-12 NOTE — PLAN OF CARE
Problem: General Rehab Plan of Care  Goal: Therapeutic Recreation/Music Therapy Goal (Art Therapy)  Description: The patient and/or their representative will achieve their patient-specific goals related to the plan of care.  The patient-specific goals include: emotional expression  Outcome: No Change   Art Therapy directive is to create a drawing of self as a landscape by using metaphor to create a personal self narrative. Goals of directive: to identify personal strengths and goals, aspects of self, emotional expression, emotional regulation.   Pt was a positive participant, focused on task for the full duration of group. Pt finished drawing and briefly shared with group. Pt monica an image of a cabin in [facility] and described how it is a peaceful, therapeutic place for pt.  Pts mood was calm, pleasant participant.

## 2021-03-12 NOTE — PROGRESS NOTES
Initial Services Plan        Service Initiation Date: 3/12/2021    Immediate health and/or safety concerns: Yes    Identify health and safety concern(s) below and include plan to address:    Report all current medical issues to RNs or LP staff to assess for urgency.     Treatment suggestions for client during the time between intake (admit date) and completion of the individual treatment plan:     Look for a sober support network, i.e. 12 step, [facility], [facility], etc  Tour the treatment center or outpatient clinic  Introduce yourself to your treatment group. Spend time getting to know your peers  Review your patient or client handbook  Begin working on your treatment goal list    Completed by: SHANNAN Douglas  Date completed: 3/12/2021 at 1:16 PM

## 2021-03-12 NOTE — PROGRESS NOTES
Name: Saroj Summers  Date: 3/12/2021  Medical Record: [id number]    Envelope Number: [id number]    List of Contents (List each item separately in new row):   One cell phone  Admission:  I am responsible for any personal items that are not sent to the safe or pharmacy.  [facility] is not responsible for loss, theft or damage of any property in my possession.    Patient Signature:  ___________________________________________       Date/Time:__________________________    Staff Signature: __________________________________       Date/Time:__________________________    2nd Staff person, if patient is unable/unwilling to sign:      __________________________________________________________       Date/Time: __________________________    Discharge:  [facility] has returned all of my personal belongings:    Patient Signature: ________________________________________     Date/Time: ____________________________________    Staff Signature: ______________________________________     Date/Time:_____________________________________

## 2021-03-12 NOTE — PROGRESS NOTES
[facility]  [address] [place] [address] [address]th and [address] [place] [address]        ADULT CD ASSESSMENT ADDENDUM      Patient Name: Saroj Summers  Cell Phone:   Home: [phone] (home)    Mobile:   Telephone Information:   Mobile [phone]       Email:  [email or web address]    Emergency Contact: Zhen Summers- son   Tel: [phone]    The patient reported being:  Single, no serious involvement-     With which race do you identify? White    Initial Screening Questions     1. Are you currently having severe withdrawal symptoms that are putting yourself or others in danger?  No    2. Are you currently having severe medical problems that require immediate attention?  No    3. Are you currently having severe emotional or behavioral problems that are putting yourself or others at risk of harm?  No    4. Do you currently participate in community arianna activities, such as attending Mandaeism, temple, Confucianist or Catholic services?  Yes, explain: weekly    5. How does your spirituality impact your recovery?  It's a support to my recovery    6. Do you currently self-administer your medications?  Yes    Do you have a valid 's license?    Yes       Mental Health Status   Physical Appearance/Attire: Appears stated age   Hygiene: well groomed   Eye Contact: at examiner   Speech Rate:  regular   Speech Volume: regular   Speech Quality: fluid   Cognitive/Perceptual:  reality based   Cognition: memory intact    Judgment: intact and able to concentrate   Insight: intact and able to concentrate   Orientation:  time, place, person and situation   Thought: logical    Hallucinations:  none   General Behavioral Tone: cooperative   Psychomotor Activity: no problem noted   Gait:  no problem   Mood: normal   Affect: congruence/appropriate   Counselor Notes: NA     Criteria for Diagnosis: DSM-5 Criteria for Substance Use Disorders      Alcohol Use Disorder Severe - 303.90 (F10.20)  Tobacco  Use Disorder Mild - 305.10 (Z72.0)    Level of Care   I.) Intoxication and Withdrawal: 0   II.) Biomedical:  1   III.) Emotional and Behavioral:  2   IV.) Readiness to Change:  1   V.) Relapse Potential: 4   VI.) Recovery Environmental: 4     Initial Problem List       The patient is experiencing homelessness.  The patient lacks relapse prevention skills  The patient has poor coping skills  The patient has poor refusal skills   The patient lacks a sober peer support network  The patient has a tendency to isolate  The patient has dual issues of MI and CD  The patient lacks the ability to effectively manage his/her mental health issues    Patient/Client is willing to follow treatment recommendations.  Yes    Counselor: SHANNAN Douglas

## 2021-03-12 NOTE — PROGRESS NOTES
Progress Note    This patient had a Comprehensive Substance Abuse assessment on 3/11/2021 completed by SHANNAN Silva.  This patient was seen for a face to face update of the Comprehensive Substance Abuse assessment on 3/12/2021 by SHANNAN Douglas.  INSIDE: The patient's Comprehensive Substance Abuse assessment completed on 3/11/2021 is in the patient's electronic medical record in Epic in the Chart Review section under the Notes/Trans Tab.    Alcohol/Drug use since the last CD evaluation (include date of last use):     No additional substances use since the last CD evaluation     Please note any other clinical changes since the last CD evaluation (such as medication changes, additional legal charges, detoxification admissions, overdoses, etc.)     No significant changes since the last CD evaluation       ASAM Dimensions Original scores Current Scores   I.) Intoxication and Withdrawal: 1 0   II.) Biomedical:  1 1   III.) Emotional and Behavioral:  2 2   IV.) Readiness to Change:  1 1   V.) Relapse Potential: 4 4   VI.) Recovery Environmental: 4 4     Please list clinical justifications for the above ASAM score changes since the original comprehensive assessment:     The patient's score on Dimension I changed from a 1 to a 0.  The patient's withdrawal symptoms had been addressed by his physicians while he had been on 3 A IP detoxification unit at [facility] in [place], [place].         Current AHSLY: Current UA:     No ASHLY as the patient was a direct transfer from 3 A IP detoxification unit at [facility] in [place], [place].     No UA screen as the patient was a direct transfer from 3 A IP detoxification unit at [facility] in [place], [place].        PHQ-9, LUCY-7   PHQ-9 on 3/12/2021 LUCY-7 on 3/12/2021   The patient's PHQ-9 score was 23 out of 27, indicating severe depression.   The patient's LUCY-7 score was 21 out of 21, indicating severe anxiety.       [place]-Suicide Severity  Rating Scale Reassessment   Have you ever wished you were dead or that you could go to sleep and not wake up?  Past Month:  No     Have you actually had any thoughts of killing yourself?  Past Month:  No     Have you been thinking about how you might do this?     Past Month:  No   Lifetime:  No   Have you had these thoughts and had some intention of acting on them?     Past Month:  No   Lifetime:  No   Have you started to work out the details of how to kill yourself?   Past Month:  No   Lifetime:  No   Do you intend to carry out this plan?   No     When you have the thoughts how long do they last?  The patient denied ever having any suicidal thoughts in life.     Are there things - anyone or anything (i.e. family, Temple, pain of death) that stopped you from wanting to die or acting on thoughts of suicide?  Does not apply       2008  [employer]  Used with permission by Brittni Crystal, PhD.       Guide to C-SSRS Risk Ratings   NO IDEATION:  with no active thoughts IDEATION: with a wish to die. IDEATION: with active thoughts. Risk Ratings   If Yes No No 0 - Very Low Risk   If NA Yes No 1 - Low Risk   If NA Yes Yes 2 - Low/moderate risk   IDEATION: associated thoughts of methods without intent or plan INTENT: Intent to follow through on suicide PLAN: Plan to follow through on suicide Risk Ratings cont...   If Yes No No 3 - Moderate Risk   If Yes Yes No 4 - High Risk   If Yes Yes Yes 5 - High Risk   The patient's ADDITIONAL RISK FACTORS and lack of PROTECTIVE FACTORS may increase their overall suicide risk ratings.     Additional Risk Factors:    Significant history of having untreated or poorly treated mental health symptoms   Protective Factors:    Having people in his/her life that would prevent the patient from considering a suicide attempt (i.e. young children, spouse, parents, etc.)     An absence of chronic health problems or stable and well treated chronic health issues      "A positive relationship with his/her clinical medical and/or mental health providers     Having easy access to supportive family members     Having a good community support network     Having cultural, Gnosticist or spiritual beliefs that discourage suicide     Having restricted access to highly lethal means of suicide     Risk Status   0. - Very Low Risk:  Evaluation Counselors:  Document in Epic / SBAR to counselor \"Very Low Risk\".      Treatment Counselors:  Reassess upon admission as applicable, assess weekly in progress notes under Dimension 3 and summarize in Discharge / Treatment summary under Dimension 3.     Additional information to support suicide risk rating: There was no additional information to provide at this time.       "

## 2021-03-12 NOTE — PROGRESS NOTES
"[facility] Assessment      Vital signs:     There were no vitals taken for this visit.      Transfer from     Counselor: Kulwinder  Drug of Choice: Alcohol  Last use: 3/6/2021  Home clinic/MD: Dr. Gardner: Has appt. 3/15/21 at 1345 via phone  Psychiatrist/therapist: Does not currently have one established- interested in establishing care.    Medical history/current conditions:  Alcoholism, HTN, emphysema    H&P Screen:  H&P within the last 90 days: Yes.  Date: 3/12/21 Location:       Mental Health diagnosis: Possible depression per pt. Report- not diagnosed  Medication compliant?: no  Recent sucidal thoughts? no     When? n/a  Current thought of self-harm? no    Plan? no    Pain assessment:   Pt. Experiencing pain at this time?  7 LUQ- painful to lay down and stand up    [facility] Medical Screen for COVID19    In the last 2 weeks have you been in an large group gatherings (more than 10 people)? no    Have you been covering your nose and mouth while out in the community? yes    Have you come in contact with anyone in the last month who \"was suspected of\" or \"tested positive\" for COVID-19? no    Have you tested positive for COVID-19 in the past 90 days?no  -If yes pt should not be re-tested until 90 days from day one of symptom onset   UNLESS patient is COVID symptomatic, contact infection prevention for recommendation    \"Do you\" or \"have you\" had....any of the following symptoms in the last 2 weeks? no      Fever or chills     Cough     Shortness of breath or difficulty breathing     New muscle or body aches    New headache     New loss of taste or smell    Sore throat     Congestion or runny nose     New and unexplained Nausea or vomiting     Diarrhea    New and unexplained fatigue    Does the above COVID screen need to be reviewed by Infection Prevention? no    COVID TEST COMPLETED BY [facility] ? Completed on 3/9/21    COVID-19 - Pt informed of the following while at LP:    1)Staff will " take temperature and O2Sats twice daily    2) Practice good hand washing hygiene and avoid touching face    3) If pt has any of the symptoms below, notify staff immediately.      Fever     Cough     Shortness of breath or difficulty breathing     Chills     Repeated shaking with chills    Muscle pain     4) COVID testing maybe initiated at admission. Depending on the situation amd symptoms patients may be tested more than once during their stay.  Patient will be required to stay in room until lab results confirm negative. If you are unable to stay in your room you may be asked to leave the program.  If COVID results are positive, You will have to exit the program quarantine as recommended per CDC and then may return for CD treatment after symptoms have resolved    5) Per COVID protocol, during your stay at LP, social distancing is required AND mouth and nose must be covered at all times with facial mask while out in milieu.      6) Patients will not be allowed to go to any outside appointments, all outside appointments will need to be virtual or by phone      Integrative Therapies: Essential Oils    Patient requesting essential oil inhaler to manage (Mood/Mental Health/Physical/Spiritual symptoms).     Discussed appropriate use of essential oil inhalers and instructed patient not to leave labeled product out on unit.     Patient was screened for kidney disease, asthma/reactive airway disease and rashes and wounds or 1st trimester of pregnancy    List Essential Oils requested by pt Not offered due to emphysema    Patient verbalized and demonstrated understanding of how to use essential oil inhaler correctly and will notify LP RN with any concerns or side effects. Patient agrees not to share their essential oil inhaler with other clients.  Continue to support the patient in safely utilizing integrative therapies as able to manage symptoms during treatment.       Patient tobacco use:    Do you use tobacco? yes   Type?  "Cigarettes and chewing tobacco  How often? Daily  How much? 1/2 pack a day, chewing- daily: 3x/day   Are you interested in quitting? Yes    NRT (Nicotine Replacement therapy) ordered? Patches have been ordered   Pt is aware of the dangers of tobacco cessation and in contemplation.    Pt given written education.          Nutritional Assessment:    Have you ever purged, binged or restricted yourself as a way to control your weight?   No     Are you on a special diet?   No     Do you have any concerns regarding your nutritional status?   No     Have you had any appetite changes in the last 3 months?   No   Have you had weight loss or weight gain of more than 10 lbs in the last 3 months?   If patient gained or lost more than 10 lbs, then refer to program RN / attending Physician for assessment.   No   Was the patient informed of BMI? 26.06 kg/m Abnormal     Above,  General nutrition education, pt. Will attend mandatory lecture about weight and nutrition    Wt: 203lb  Ht:6\"2'   Yes   Have you engaged in any risk-taking behavior that would put you at risk for exposure to blood-borne or sexually transmitted diseases?   No   Do you have any dental problems?   No           Nursing Assessment Summary:  As Above    On-going nursing intervention required?   No    Acute care visit recommended: no N/A      "

## 2021-03-12 NOTE — PLAN OF CARE
Patient appeared to be asleep during safety checks this shift. No complaints or concerns voiced by patient or noted by staff. Will continue to monitor and update if there are changes.

## 2021-03-12 NOTE — DISCHARGE INSTRUCTIONS
Behavioral Discharge Planning and Instructions  THANK YOU FOR CHOOSING [facility]  3AW  [phone]    Summary: You were admitted to Station 3A on 3/9/2021 for detoxification from alcohol.  A medical exam was performed that included lab work. You have met with a  and opted to attend treatment at [facility].  Please take care and make your recovery a daily priority, Herb! It was a pleasure working with you and the entire treatment team here wishes you the very best in your recovery!     Recommendation:  28 days in [facility]    Main Diagnoses:  Per Dr. Tracy Rolle MD: psychiatrist  Alcohol use disorder severe  Alcohol alcohol dependence  Alcohol withdrawal severe    Major Treatments, Procedures and Findings:  You were treated for alcohol detoxification using valium administered based on the [facility] protocol. You had a chemical dependency assessment. You had labs drawn and those results were reviewed with you. Please take a copy of your lab work with you to your next primary care provider appointment.    Symptoms to Report:  If you experience more anxiety, confusion, sleeplessness, deep sadness or thoughts of suicide, notify your treatment team or notify your primary care provider. IF ANY OF THE SYMPTOMS YOU ARE EXPERIENCING ARE A MEDICAL EMERGENCY CALL 911 IMMEDIATELY.     Lifestyle Adjustment:Health Action Plan:  1.Create a daily schedule  2. Eat Healthy  3. Plan Enjoyable Sober Activities  4. Use Problem Solving Skills and Deal with Issues as they Arise.   5. Be Physically Active  6. Take your medications as prescribed  7. Get enough restful sleep  8. Practice Relaxation  9. Spend time with Supportive People  10. No use of alcohol, illegal drugs or addictive medications other than what is currently prescribed.   11.AA, NA Sponsor are excellent resources for support  12. Explore how  you can utilize spirituality in your recovery    Disposition: [facility]    Facts about  COVID19 at www.cdc.gov/COVID19 and www.[place].gov/covid19    Keeping hands clean is one of the most important steps we can take to avoid getting sick and spreading germs to others.  Please wash your hands frequently and lather with soap for at least 20 seconds!    Medical Follow-Up:  Dr. Gardner  [facility]  [phone]  Monday, March 15, 2021 @ 1:45 pm.     Treatment Follow-Up:  [facility]  Admission: Friday March 12th at 12:45 pm  [address]th [place] [address]  [phone]    Recovery apps for your phone to locate current in person and zoom recovery meetings  [place] - meeting katy  AA  - meeting katy  Meeting guide - meeting katy  Quick NA meeting - meeting katy  Osito- has various apps    Resources:  *due to covid-19 most AA/NA meetings are being held online*  AA meetings online search for them at: https://aa-intergroup.org (worldwide meeting listings)  AA meetings for [place] area can be found online at: https://a[place].org (click local online meetings listings)  NA meetings for [place] area can be found online at: https://www.nam[place].org  (click find a meeting)  AA and NA Sponsors are excellent resources for support and you can find one at any support group meeting.   Alcoholics Anonymous (https://aa.org/): for information 24 hours/day  AA Intergroup service office in [place] (http://www.aastpaul.org/) [phone]  AA Intergroup service office in [facility]: [phone]. (http://www.a[place].org/)  Narcotics Anonymous (www.nam[place].org) (892) 853-8569  https://aafairviewriverside.org/meetings  SMART Recovery - self management for addiction recovery:  www.smartrecovery.org  Pathways ~ A Health Crisis Resource & Support Center:  539.623.9998.  https://prescribetoprevent.org/patient-education/videos/  http://www.harmreduction.org  [facility] [phone]  Support Group:  AA/NA and  Sponsor/support.  National [place] on Mental Illness (www.[place].olga.org): 233.907.5832 or 705-145-4154.  Alcoholics Anonymous (www.alcoholics-anonymous.org): Check your phone book for your local chapter.  Suicide Awareness Voices of Education (SAVE) (www.save.org): 058-332-MXSC (2154)  National Suicide Prevention Line (www.mentalhealthmn.org): 468-844-KWOZ (7483)  Mental Health Consumer/Survivor Network of [place] (www.mhcsn.net): 785.586.3411 or 373-224-0170  Mental Health Association of [place] (www.mentalhealth.org): 122.314.1888 or 778-618-9722   Substance Abuse and Mental Health Services (www.samhsa.gov)  [place] Opioid Prevention Coalition: www.opioidcoalition.org    [place] Recovery Connection (MRC)  [facility] connects people seeking recovery to resources that help foster and sustain long-term recovery.  Whether you are seeking resources for treatment, transportation, housing, job training, education, health care or other pathways to recovery, [facility] is a great place to start.  698.877.4009.  www.min[place]arecYebhiy.org    Great Pod casts for nutrition and wellness  Listen on Apple Podcasts  [employer]   Nutrition       Understand the connection between what you eat and how you feel. Hosted by licensed nutritionists and dietitians from [employer] [facility] we share practical, real-life solutions for healthier living through nutrition.     General Medication Instructions:   See your medication sheet(s) for instructions.   Take all medications as prescribed.  Make no changes unless your primary care provider suggests them.     Please Note:  If you have any questions at anytime after you are discharged please call [facility] detox unit 3AW at [phone].  [facility], Behavioral Intake [phone]  Medical Records call [phone]  Outpatient Behavioral Intake call [phone]  [facility] Wait List/Bed Availability call [phone]    Please remember to take all  of your behavioral discharge planning and lab paperwork to any follow up appointments, it contains your lab results, diagnosis, medication list and discharge recommendations.      THANK YOU FOR CHOOSING [facility]

## 2021-03-13 ENCOUNTER — HOSPITAL ENCOUNTER (OUTPATIENT)
Dept: BEHAVIORAL HEALTH | Facility: CLINIC | Age: 61
End: 2021-03-13
Attending: FAMILY MEDICINE
Payer: COMMERCIAL

## 2021-03-13 VITALS — TEMPERATURE: 98.9 F | OXYGEN SATURATION: 96 %

## 2021-03-13 PROCEDURE — H2035 A/D TX PROGRAM, PER HOUR: HCPCS | Mod: HQ

## 2021-03-13 PROCEDURE — 1002N00001 HC LODGING PLUS FACILITY CHARGE ADULT

## 2021-03-13 ASSESSMENT — ANXIETY QUESTIONNAIRES: GAD7 TOTAL SCORE: 21

## 2021-03-13 NOTE — GROUP NOTE
Psychoeducation Group Documentation    PATIENT'S NAME: Saroj Summers  MRN:   [id number]  :   1960  ACCT. NUMBER: [id number]  DATE OF SERVICE: 3/13/21  START TIME:  8:45 AM  END TIME: 10:30 AM  FACILITATOR(S): Dimitrios Powell LADC; Denice Hearn ADC-T  TOPIC: BEH Pyschoeducation  Number of patients attending the group: 7  Group Length:  2 Hours    Skills Group Therapy Type: Recovery skills    Summary of Group / Topics Discussed:    Balanced lifestyle skills and Relapse prevention skills    Group Attendance:  Attended group session    Patient's response to the group topic/interactions:  cooperative with task and listened actively    Patient appeared to be Attentive and Engaged.         Client specific details: Pt was attentive during a lecture on relapse prevention skills.

## 2021-03-13 NOTE — GROUP NOTE
Group Therapy Documentation    PATIENT'S NAME: Saroj Summers  MRN:   [id number]  :   1960  ACCT. NUMBER: [id number]  DATE OF SERVICE: 3/13/21  START TIME: 12:30 PM  END TIME:  2:30 PM  FACILITATOR(S): Coleman Del Toro LADC; Dimitrios Powell LADC  TOPIC: BEH Group Therapy  Number of patients attending the group:  7  Group Length:  2 Hours    Group Therapy Type: Recovery strategies    Summary of Group / Topics Discussed:    Trauma informed care and Relapse prevention      Group Attendance:  Attended group session    Patient's response to the group topic/interactions:  cooperative with task    Patient appeared to be Attentive.        Client specific details:  Herb gave appropriate feedback..

## 2021-03-14 ENCOUNTER — HOSPITAL ENCOUNTER (OUTPATIENT)
Dept: BEHAVIORAL HEALTH | Facility: CLINIC | Age: 61
End: 2021-03-14
Attending: FAMILY MEDICINE
Payer: COMMERCIAL

## 2021-03-14 VITALS — OXYGEN SATURATION: 100 % | TEMPERATURE: 96.7 F

## 2021-03-14 PROCEDURE — 1002N00001 HC LODGING PLUS FACILITY CHARGE ADULT

## 2021-03-14 PROCEDURE — H2035 A/D TX PROGRAM, PER HOUR: HCPCS | Mod: HQ

## 2021-03-14 NOTE — GROUP NOTE
Group Therapy Documentation    PATIENT'S NAME: Saroj Summers  MRN:   [id number]  :   1960  ACCT. NUMBER: [id number]  DATE OF SERVICE: 3/14/21  START TIME:  8:40 AM  END TIME: 10:30 AM  FACILITATOR(S): Dimitrios Powell LADC; Maryjo Carrera RN  TOPIC: BEH Group Therapy  Number of patients attending the group:  7  Group Length:  2 Hours    Group Therapy Type: Health and wellbeing     Summary of Group / Topics Discussed:    Self-care activities      Group Attendance:  Attended group session    Patient's response to the group topic/interactions:  cooperative with task    Patient appeared to be Attentive.        Client specific details:  Herb gave appropriate feedback..

## 2021-03-14 NOTE — GROUP NOTE
Group Therapy Documentation    PATIENT'S NAME: Saroj Summers  MRN:   [id number]  :   1960  ACCT. NUMBER: [id number]  DATE OF SERVICE: 3/14/21  START TIME: 12:30 PM  END TIME:  1:30 PM  FACILITATOR(S): Denice Hearn LADC; Dimitrios Powell LADC  TOPIC: BEH Group Therapy  Number of patients attending the group:  7  Group Length:  1 Hours    Group Therapy Type: Recovery strategies    Summary of Group / Topics Discussed:    Relapse prevention      Group Attendance:  Attended group session    Patient's response to the group topic/interactions:  cooperative with task    Patient appeared to be Attentive.        Client specific details:  Herb gave appropriate feedback..

## 2021-03-15 ENCOUNTER — HOSPITAL ENCOUNTER (OUTPATIENT)
Dept: BEHAVIORAL HEALTH | Facility: CLINIC | Age: 61
End: 2021-03-15
Attending: FAMILY MEDICINE
Payer: COMMERCIAL

## 2021-03-15 DIAGNOSIS — F17.200 NICOTINE DEPENDENCE: Primary | ICD-10-CM

## 2021-03-15 PROCEDURE — H2035 A/D TX PROGRAM, PER HOUR: HCPCS | Mod: HQ

## 2021-03-15 PROCEDURE — 1002N00001 HC LODGING PLUS FACILITY CHARGE ADULT
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
## 2021-03-15 NOTE — GROUP NOTE
Group Therapy Documentation    PATIENT'S NAME: Saroj Summers  MRN:   [id number]  :   1960  ACCT. NUMBER: [id number]  DATE OF SERVICE: 3/15/21  START TIME: 12:30 PM  END TIME:  2:30 PM  FACILITATOR(S): Elver Garrido LADC  TOPIC: BEH Group Therapy  Number of patients attending the group:  8  Group Length:  2 Hours    Group Therapy Type: Health and wellbeing     Summary of Group / Topics Discussed:    Spiritual Care      Group Attendance:  Attended group session    Patient's response to the group topic/interactions:  cooperative with task    Patient appeared to be Actively participating.        Client specific details:  Herb participated and interacted appropriately with peers and staff in PM group. No triggers to use noted or discussed.

## 2021-03-15 NOTE — PROGRESS NOTES
Pt displaying affects of being tired/drowsiness throughout the entire day.  Falling asleep during programming.  Writer asking pt to speak with his provider about his medications. (He has appt today at 1:30)    Per pt report, he has never been prescribed, prior to detox/[facility],  Gabapentin (600mg TID), Trazodone, Hydroxyzine or methocarbamol and has been taking all of these while at [facility].      Will await the providers reply

## 2021-03-15 NOTE — GROUP NOTE
Group Therapy Documentation    PATIENT'S NAME: Saroj Summers  MRN:   [id number]  :   1960  ACCT. NUMBER: [id number]  DATE OF SERVICE: 3/15/21  START TIME:  9:00 AM  END TIME: 11:00 AM  FACILITATOR(S): Lionel Mcfarland LADC  TOPIC: BEH Group Therapy  Number of patients attending the group:  8  Group Length:  2 Hours    Group Therapy Type: Recovery strategies    Summary of Group / Topics Discussed:    Recovery Principles      Group Attendance:  Attended group session    Patient's response to the group topic/interactions:  cooperative with task    Patient appeared to be Attentive.        Client specific details:  Herb attended AM group and was attentive. They talked about what their thoughts on getting support from others including family and friends. Patient checked in with feeling, goal for the week, and took part in a discussion on what they would do /attempt if they knew they wouldn't fail.

## 2021-03-15 NOTE — PROGRESS NOTES
Name: Saroj Summers  Date: 3/15/2021  Medical Record: [id number]    Envelope Number: [id number]  List of Contents (List each item separately in new row):   -Thiamine HCL 100mg tabs  -Pantoprazole Sodium 40mg TBEC  -Gabapentin 300mg caps  -Trazodone HCL 50mg tabs  -Methocarbamol 500mg tabs  -hydroxyzine HCL 25mg tab    Admission:  I am responsible for any personal items that are not sent to the safe or pharmacy.  [facility] is not responsible for loss, theft or damage of any property in my possession.    Patient Signature:  ___________________________________________       Date/Time:__________________________    Staff Signature: __________________________________       Date/Time:__________________________    2nd Staff person, if patient is unable/unwilling to sign:      __________________________________________________________       Date/Time: __________________________    Discharge:  [facility] has returned all of my personal belongings:    Patient Signature: ________________________________________     Date/Time: ____________________________________    Staff Signature: ______________________________________     Date/Time:_____________________________________

## 2021-03-16 ENCOUNTER — HOSPITAL ENCOUNTER (OUTPATIENT)
Dept: BEHAVIORAL HEALTH | Facility: CLINIC | Age: 61
End: 2021-03-16
Attending: FAMILY MEDICINE
Payer: COMMERCIAL

## 2021-03-16 VITALS — OXYGEN SATURATION: 97 % | TEMPERATURE: 97.2 F

## 2021-03-16 VITALS — TEMPERATURE: 97.9 F | OXYGEN SATURATION: 98 %

## 2021-03-16 PROCEDURE — 1002N00001 HC LODGING PLUS FACILITY CHARGE ADULT

## 2021-03-16 PROCEDURE — H2035 A/D TX PROGRAM, PER HOUR: HCPCS | Mod: HQ

## 2021-03-16 NOTE — GROUP NOTE
Group Therapy Documentation    PATIENT'S NAME: Saroj Summers  MRN:   [id number]  :   1960  ACCT. NUMBER: [id number]  DATE OF SERVICE: 3/16/21  START TIME:  9:00 AM  END TIME: 11:00 AM  FACILITATOR(S): Lionel Mcfarland LADC  TOPIC: BEH Group Therapy  Number of patients attending the group:  7  Group Length:  2 Hours    Group Therapy Type: Recovery strategies    Summary of Group / Topics Discussed:    Recovery Principles      Group Attendance:  Attended group session    Patient's response to the group topic/interactions:  cooperative with task    Patient appeared to be Attentive.        Client specific details:  Herb attended AM group. Pt was attentive and engaged. Patient took part in a discussion on loneliness and how that has been a factor for them and also how they will address that in recovery. Patient also checked in and talked about rituals/activities that they associate with drinking/drugging and how they will change their lifestyle to a recovery based one.

## 2021-03-16 NOTE — PROGRESS NOTES
"Patient:  Saroj Summers    Day Monday  Tues  Weds  Thurs  Friday  Sat  Sulaiman    Group Hours    0 hours 0 hours  0 hours 0 hours 0 hours  4 hours  1 hours   Skills Hours    0 hours 0 hour 0 hours  0 hour 0 hours 0 hours  2 hours   Individual Session (LADC)    0 hours 0 hours 0 hours 0 hours 0 hours 0 hours 0 hours   Individual Session  (psychotherapy)    0 hours 0  hours 0 hours 0 hours 0 hours 0 hours 0 hours   Peer-led Recovery Group    0 hour 0 hour 0 hour  0 hour 0 hour  0 hour 1 hour               Adult CD Progress Note and Treatment Plan Review     Attendance  Please refer to OP BEH CD Adult Attendance Record Documentation Flowsheet    Support group attended this week: Yes    Reporting sobriety: Yes    Treatment Plan     Treatment Plan Review competed on: 3/16/2021        Staff Members contributing: SHANNAN Collazo; SHANNAN Aggarwal; Lily Silveira                         Received Supervision: Lily Silveira     Client: Pt contributed to goals and plan.    Client received copy of plan/revised plan: Pt's tx plan is in progress     Client agrees with plan/revised plan: Pt's tx plan is in progress         Changes to Treatment Plan: Pt's tx plan is in progress     New Goals added since last review: Pt's tx plan is in progress     Goals worked on since last review: Aftercare planning, sobriety, group therapy, build sober support network, psychoeducation. Pt reported that he has \"attended all meetings\" since being in [facility]       Strategies effective: Yes     Strategies need these changes:  Pt's tx plan is in progress    1) Care Coordination Activities: Pt is exploring his aftercare options.  2) Medical, Mental Health, and other appointments the client attended: Pt reported no appointments this past week.  3) Medication issues: Pt reported no concerns at this time.  4) Physical and mental health problems: Pt reported no concerns at this time.  5) Any changes in Vulnerable Adult Status? No. If yes, " "add to treatment plan and individual abuse prevention plan.  6) Review and evaluation of the individual abuse prevention plan: Current IAPP for this program is adequate for this client.    ASAM Risk Rating:    Dimension 1 0: Pt reported 3/9/2021 last use date as 3/9/2021. Pt reported PAWS symptomatology this past week as: \"body pain, uneasiness, and anxiety.\" Pt will be monitored.     Dimension 2 1:  Pt denied having medical or medication issues this past week. Pt did not attend any healthcare appointments this past week and denied scheduling any appointments for the future. Pt reported that \"he will speak today (3/15/21) with his doctor.\"    Dimension 3 2: Pt denied having suicidal thoughts at this time. Pt reported no significant changes in his mood or changes in his stress level this past week. Pt reported using \"going outside and walking\" as his coping techniques for dealing with difficult emotions this past week. Pt reported no triggers to use this past week.     Dimension 4 1: Pt expresses internal motivation for change. Pt is active in group process, accepts and provides feedback, has good insight, and appears engaged. Pt is supportive of his group peers. Pt reported that \"his children\" are what motivated him to be sober and to stay in treatment this week.    Dimension 5 4: Pt reported that his cravings were at a 4 this past week on a scale of 1 to 10, 10 being the highest/ most severe. Pt reported that \"changing his thoughts\" have been his coping skills he used to manage cravings this past week.    Dimension 6 4: Pt is exploring his aftercare options.    Guide to C-SSRS Risk Ratings   NO IDEATION:  with no active thoughts IDEATION: with a wish to die. IDEATION: with active thoughts. Risk Ratings   If Yes No No 0 - Very Low Risk   If NA Yes No 1 - Low Risk   If NA Yes Yes 2 - Low/moderate risk   IDEATION: associated thoughts of methods without intent or plan INTENT: Intent to follow through on suicide PLAN: " "Plan to follow through on suicide Risk Ratings cont...   If Yes No No 3 - Moderate Risk   If Yes Yes No 4 - High Risk   If Yes Yes Yes 5 - High Risk   The patient's ADDITIONAL RISK FACTORS and lack of PROTECTIVE FACTORS may increase their overall suicide risk ratings.     Pt's current risk rating: \"Very Low Risk\"    Any changes in Vulnerable Adult Status? No.    If yes, add to treatment plan and individual abuse prevention plan.    Family Involvement:   Pt reported that his family is supportive of his tx.     Data:   Pt offered feedback, had good insight, and patient actively participated in group. Pt shares openly during group check-in. Pt reported that \"walking\" has been his recreational activity he participated in this past week. Pt reported that he has gotten along \"good\" with his peers and staff this past week.      Pt is also gaining trust of peers and counselors.       Intervention:   Counselor feedback  Group feedback  Relapse prevention  Aftercare planning  Cognitive behavior therapy  Counselor feedback  Education  Emotional management  Motivational enhancement therapy   Twelve Step facilitation  Mental health education  Pt and counselor reviewed and signed ISP and assessment summary.      Assessment:   Stages of Change Model  Preparation     Appears/ Sounds:  Cooperative  Motivated  Engaged      Plan:  Focus on recovery environment  Monitor emotional/physical health    Continue group therapy, go to AA/NA meetings when available, work with sponsor, build sober support network, engage in daily structured activities, and have sober fun.            SHANNAN Collazo        "

## 2021-03-16 NOTE — GROUP NOTE
Group Therapy Documentation    PATIENT'S NAME: Saroj Summers  MRN:   [id number]  :   1960  ACCT. NUMBER: [id number]  DATE OF SERVICE: 3/16/21  START TIME: 12:30 PM  END TIME:  2:30 PM  FACILITATOR(S): Elver Garrido LADC  TOPIC: BEH Group Therapy  Number of patients attending the group:  7  Group Length:  2 Hours    Group Therapy Type: Health and wellbeing     Summary of Group / Topics Discussed:    Disease of addiction      Group Attendance:  Attended group session    Patient's response to the group topic/interactions:  cooperative with task    Patient appeared to be Actively participating.        Client specific details:  Herb participated and interacted appropriately with peers and staff in PM group. No triggers to use noted or discussed.

## 2021-03-16 NOTE — GROUP NOTE
Psychoeducation Group Documentation    PATIENT'S NAME: Saroj Summers  MRN:   [id number]  :   1960  ACCT. NUMBER: [id number]  DATE OF SERVICE: 3/16/21  START TIME:  3:00 PM  END TIME:  4:00 PM  FACILITATOR(S): Clementine Linn; Denice Hearn ADC-T  TOPIC: BEH Pyschoeducation  Number of patients attending the group: 6  Group Length:  1 Hours    Skills Group Therapy Type: Recovery skills    Summary of Group / Topics Discussed:    Relapse prevention skills    Group Attendance:  Attended group session    Patient's response to the group topic/interactions:  cooperative with task and listened actively    Patient appeared to be Attentive and Engaged.         Client specific details: Pt was engaged and attentive during a presentation on the disease of addiction and a personal account of the toll drugs can take.

## 2021-03-16 NOTE — PROGRESS NOTES
"Pt had an appt with PCP Quintin Gardner, DO  yesterday afternoon. Pt came to writer and reported his provider said \"Only take losartan, you don't need any other medication at this time.\" Pt was also taking B-1, gabapentin, Protonix, trazodone PRN and Hydroxyzine PRN When writer asked for a release to be signed so RN could verify medication changes with pt's provider, pt declined and said he \"needed to think about it.\" Pt said \"I don't need the doctors permission to stop meds.\" Pt's B-1, gabapentin, Protonix, trazodone PRN and Hydroxyzine PRN were sent to security as pt is refusing to take them and writer cannot confirm orders with patents outside provider. Pt verbalized agreement that he will come to nursing with any medical concerns.   "

## 2021-03-16 NOTE — PROGRESS NOTES
Patient: Herb Summers     Date: 3/16/21     Comprehensive Assessment UPDATE         Comprehensive Summary Update and Review  Counselor met with patient on 3/16 and reviewed the Comprehensive Assessment.    There were no changes/updates identified by patient or in chart entries.

## 2021-03-16 NOTE — PROGRESS NOTES
Pt completed and submitted their safety plan and tx plan goals. Please see scan in media section.

## 2021-03-17 ENCOUNTER — TELEPHONE (OUTPATIENT)
Dept: BEHAVIORAL HEALTH | Facility: CLINIC | Age: 61
End: 2021-03-17

## 2021-03-17 ENCOUNTER — HOSPITAL ENCOUNTER (OUTPATIENT)
Dept: BEHAVIORAL HEALTH | Facility: CLINIC | Age: 61
End: 2021-03-17
Attending: FAMILY MEDICINE
Payer: COMMERCIAL

## 2021-03-17 VITALS — OXYGEN SATURATION: 97 % | TEMPERATURE: 97.6 F

## 2021-03-17 PROCEDURE — 1002N00001 HC LODGING PLUS FACILITY CHARGE ADULT

## 2021-03-17 PROCEDURE — H2035 A/D TX PROGRAM, PER HOUR: HCPCS | Mod: HQ

## 2021-03-17 NOTE — GROUP NOTE
Group Therapy Documentation    PATIENT'S NAME: Saroj Summers  MRN:   [id number]  :   1960  ACCT. NUMBER: [id number]  DATE OF SERVICE: 3/17/21  START TIME:  9:45 AM  END TIME: 11:15 AM  FACILITATOR(S): Lionel Mcfarland LADC  TOPIC: BEH Group Therapy  Number of patients attending the group:  7  Group Length:  /2 Hours    Group Therapy Type: Recovery strategies    Summary of Group / Topics Discussed:    Recovery Principles      Group Attendance:  Attended group session    Patient's response to the group topic/interactions:  cooperative with task    Patient appeared to be Attentive.        Client specific details:  Herb attended AM group. He was attentive and engaged. Patient sat in on a peer's drug use history and gave supportive feedback. Patient took part in a group discussion on resilience and how that applies to their recovery.

## 2021-03-17 NOTE — GROUP NOTE
Group Therapy Documentation    PATIENT'S NAME: Saroj Summers  MRN:   [id number]  :   1960  ACCT. NUMBER: [id number]  DATE OF SERVICE: 3/17/21  START TIME: 12:30 PM  END TIME:  2:30 PM  FACILITATOR(S): Elver Garrido LADC  TOPIC: BEH Group Therapy  Number of patients attending the group:  7  Group Length:  2 Hours    Group Therapy Type: Emotion processing    Summary of Group / Topics Discussed:    Sober coping skills      Group Attendance:  Attended group session    Patient's response to the group topic/interactions:  cooperative with task    Patient appeared to be Actively participating.        Client specific details:  Herb participated and interacted appropriately with peers and staff in PM group. No triggers to use noted or discussed.

## 2021-03-17 NOTE — TELEPHONE ENCOUNTER
Thank you for the update.    Stopping these medications is not inherently unsafe and should not lead to any severe adverse effects or withdrawal. I agree with the plan according to nursing staff.    Gurvinder Malcolm MD

## 2021-03-17 NOTE — GROUP NOTE
Group Therapy Documentation    PATIENT'S NAME: Saroj Summers  MRN:   [id number]  :   1960  [id number]  DATE OF SERVICE: 3/17/21  START TIME:  8:30 AM  END TIME:  9:30 AM  FACILITATOR(S): Dimitrios Powell LADC; Sasha Holland LADC  TOPIC: BEH Group Therapy  Number of patients attending the group:  7  Group Length:  1 Hours    Group Therapy Type: Recovery strategies    Summary of Group / Topics Discussed:    Recovery Principles and Balanced lifestyle      Group Attendance:  Attended group session    Patient's response to the group topic/interactions:  cooperative with task    Patient appeared to be Attentive.        Client specific details:  Herb gave appropriate feedback..

## 2021-03-17 NOTE — TELEPHONE ENCOUNTER
"Dr Malcolm....fyi (see below - I don't think any further action is needed).  Pt was very sedated by medications that he usually does not take were prescribed from [facility]/detox provider and [facility]PRN's asked the pt to discuss with his community provider to discontinue..    Writer followed up with pt to explain that when a community Provider makes any medication changes the [facility] nursing staff need to have Provider documentation of this.  Writer explained this is our protocol. Writer asking pt to assist in retrieving progress note and updated med list outlining the changes that were made.    Pt then stated to writer that during the appt with his provider on 3/15/2021 he never had a discussion about stopping any medications and that he (pt) had \"already made a decision that he was not going to take the new medications that were prescribed by detox. \"  Writer then had a discussion with the pt stating that his \"play on words\" led us to believe that the Provider Kendra was the one that directed him to stop taking his medications and that the way he said it was deceptive.  Writer encouraged the pt that it is ok to just tell the truth and we could work with that.  Writer verbalized that he understood.  So just to be clear, the patient was the one that is refusing to take his B-1, Gabapentin, Trazodone PRN and Hydroxyzine PRN .  All sent to security yesterday (3/17/2021.  Counslor notified of the above as well as [facility] medical director      "

## 2021-03-18 ENCOUNTER — HOSPITAL ENCOUNTER (OUTPATIENT)
Dept: BEHAVIORAL HEALTH | Facility: CLINIC | Age: 61
End: 2021-03-18
Attending: FAMILY MEDICINE
Payer: COMMERCIAL

## 2021-03-18 PROCEDURE — 1002N00001 HC LODGING PLUS FACILITY CHARGE ADULT

## 2021-03-18 PROCEDURE — H2035 A/D TX PROGRAM, PER HOUR: HCPCS | Mod: HQ

## 2021-03-18 NOTE — GROUP NOTE
Psychoeducation Group Documentation    PATIENT'S NAME: Saroj Summers  MRN:   [id number]  :   1960  ACCT. NUMBER: [id number]  DATE OF SERVICE: 3/18/21  START TIME:  3:00 PM  END TIME:  4:00 PM  FACILITATOR(S): Saroj Kennedy [facility]; Clementine Linn; Denice Hearn LADC  TOPIC: BEH Pyschoeducation  Number of patients attending the group:  26  Group Length:  1 Hours    Skills Group Therapy Type: Emotion regulation skills    Summary of Group / Topics Discussed:    Coping/DBT skills, Symptom management skills, and Relapse prevention skills          Group Attendance:  Attended group session    Patient's response to the group topic/interactions:  cooperative with task    Patient appeared to be Attentive and Engaged.         Client specific details:  .

## 2021-03-18 NOTE — GROUP NOTE
Group Therapy Documentation    PATIENT'S NAME: Saroj Smumers  MRN:   [id number]  :   1960  ACCT. NUMBER: [id number]  DATE OF SERVICE: 3/18/21  START TIME: 12:30 PM  END TIME:  2:30 PM  FACILITATOR(S): Elver Garrido LADC  TOPIC: BEH Group Therapy  Number of patients attending the group:  7  Group Length:  2 Hours    Group Therapy Type: Recovery strategies    Summary of Group / Topics Discussed:    Sober coping skills      Group Attendance:  Attended group session    Patient's response to the group topic/interactions:  cooperative with task    Patient appeared to be Actively participating.        Client specific details:  Herb participated and interacted appropriately with peers and staff in PM group. No triggers to use noted or discussed.

## 2021-03-18 NOTE — GROUP NOTE
Group Therapy Documentation    PATIENT'S NAME: Saroj Summers  MRN:   [id number]  :   1960  ACCT. NUMBER: [id number]  DATE OF SERVICE: 3/18/21  START TIME:  9:00 AM  END TIME: 11:00 AM  FACILITATOR(S): Lionel Mcfarland LADC  TOPIC: BEH Group Therapy  Number of patients attending the group:  7  Group Length:  2 Hours    Group Therapy Type: Recovery strategies    Summary of Group / Topics Discussed:    Recovery Principles      Group Attendance:  Attended group session    Patient's response to the group topic/interactions:  cooperative with task    Patient appeared to be Attentive.        Client specific details:  Herb attended AM group. They were engaged and attentive. They talked about anger, gratitude, checked in, took part in a mindfulness exercise, and graduated a peer.

## 2021-03-19 ENCOUNTER — HOSPITAL ENCOUNTER (OUTPATIENT)
Dept: BEHAVIORAL HEALTH | Facility: CLINIC | Age: 61
End: 2021-03-19
Attending: FAMILY MEDICINE
Payer: COMMERCIAL

## 2021-03-19 VITALS — OXYGEN SATURATION: 97 % | TEMPERATURE: 97.6 F

## 2021-03-19 PROCEDURE — H2035 A/D TX PROGRAM, PER HOUR: HCPCS | Mod: HQ

## 2021-03-19 PROCEDURE — 1002N00001 HC LODGING PLUS FACILITY CHARGE ADULT

## 2021-03-19 PROCEDURE — H2035 A/D TX PROGRAM, PER HOUR: HCPCS

## 2021-03-19 NOTE — GROUP NOTE
Group Therapy Documentation    PATIENT'S NAME: Saroj Summers  MRN:   [id number]  :   1960  ACCT. NUMBER: [id number]  DATE OF SERVICE: 3/19/21  START TIME: 12:30 PM  END TIME:  2:30 PM  FACILITATOR(S): Lionel Mcfarland LADC  TOPIC: BEH Group Therapy  Number of patients attending the group:  6  Group Length:  2 Hours    Group Therapy Type: Recovery strategies    Summary of Group / Topics Discussed:    Recovery Principles      Group Attendance:  Attended group session    Patient's response to the group topic/interactions:  cooperative with task    Patient appeared to be Attentive.        Client specific details:  Herb attended PM group. Group touched on the topics of connection, love, and forgiveness. They were attentive and engaged.

## 2021-03-19 NOTE — GROUP NOTE
Group Therapy Documentation    PATIENT'S NAME: Saroj Summers  MRN:   [id number]  :   1960  ACCT. NUMBER: [id number]  DATE OF SERVICE: 3/19/21  START TIME:  10:00 AM  END TIME: 11:00 AM  FACILITATOR(S): Aide Sheppard LADC; Lionel Mcfarland LADC  TOPIC: BEH Group Therapy  Number of patients attending the group:  6  Group Length:  1 hour    Group Therapy Type: Recovery strategies    Summary of Group / Topics Discussed:    Recovery Principles      Group Attendance:  Attended group session    Patient's response to the group topic/interactions:  cooperative with task    Patient appeared to be Actively participating, Attentive and Engaged.        Client specific details:  Herb attended morning group therapy session.  He participated in discussions on normalizing feelings of wanting to leave treatment early, turn down approaches when offered substances, and the stages of change model.

## 2021-03-19 NOTE — PROGRESS NOTES
"Pt was asked to complete a UA yesterday (all patients were completing random UA's). Pt reports a prostate condition where he is only able to pee approx one time per day. He had already emptied his bladder right before he was asked to give a urine sample. He was told (per patient) he needed to pee \"in the next 2 hours or he might be discharged\" Pt says he was encouraged to drink water. Per pt report he proceeded to drink 12 240ml cups of water in a short period of time until he could urinate. Pt says he then threw up and felt very shaky. Pt said \" I think I made my self hypothermic.\" Pt said he felt better after having a warm beverage. Pt reports no medical concerns today and was encouraged to come to nursing staff with any concerns. Per staff working yesterday evening pt was encouraged to go to the ED for evaluation and pt refused. Per patient he was not encouraged to go to the ED by staff. .   "
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
## 2021-03-20 ENCOUNTER — HOSPITAL ENCOUNTER (OUTPATIENT)
Dept: BEHAVIORAL HEALTH | Facility: CLINIC | Age: 61
End: 2021-03-20
Attending: FAMILY MEDICINE
Payer: COMMERCIAL

## 2021-03-20 VITALS — OXYGEN SATURATION: 98 % | TEMPERATURE: 97.4 F

## 2021-03-20 PROCEDURE — H2035 A/D TX PROGRAM, PER HOUR: HCPCS | Mod: HQ

## 2021-03-20 PROCEDURE — 1002N00001 HC LODGING PLUS FACILITY CHARGE ADULT

## 2021-03-20 NOTE — GROUP NOTE
Group Therapy Documentation    PATIENT'S NAME: Saroj Summers  MRN:   [id number]  :   1960  ACCT. NUMBER: [id number]  DATE OF SERVICE: 3/20/21  START TIME: 12:30 PM  END TIME:  2:30 PM  FACILITATOR(S): Jayda Byrd LADC; Elver Garrido LADC; Saroj Kennedy LADC  TOPIC: BEH Group Therapy  Number of patients attending the group: 23  Group Length:  2 Hours    Group Therapy Type: Recovery strategies    Summary of Group / Topics Discussed:    Relationship/socialization      Group Attendance:  Attended group session    Patient's response to the group topic/interactions:  cooperative with task    Patient appeared to be Attentive and Engaged.        Client specific details: Herb was an active participant in afternoon session of weekend Relationships workshop.

## 2021-03-20 NOTE — GROUP NOTE
Group Therapy Documentation    PATIENT'S NAME: Saroj Summers  MRN:   [id number]  :   1960  ACCT. NUMBER: [id number]  DATE OF SERVICE: 3/20/21  START TIME:  9:00 AM  END TIME: 11:00 AM  FACILITATOR(S): Jayda Byrd LADC; Saroj Kennedy LADC; Elver Garrido LADC  TOPIC: BEH Group Therapy  Number of patients attending the group: 24  Group Length:  2 Hours    Group Therapy Type: Recovery strategies    Summary of Group / Topics Discussed:    Relationship/socialization      Group Attendance:  Attended group session    Patient's response to the group topic/interactions:  cooperative with task    Patient appeared to be Attentive and Engaged.        Client specific details: Herb was an active participant in morning session of weekend Relationships workshop.

## 2021-03-21 ENCOUNTER — HOSPITAL ENCOUNTER (OUTPATIENT)
Dept: BEHAVIORAL HEALTH | Facility: CLINIC | Age: 61
End: 2021-03-21
Attending: FAMILY MEDICINE
Payer: COMMERCIAL

## 2021-03-21 VITALS — OXYGEN SATURATION: 98 % | TEMPERATURE: 97.9 F

## 2021-03-21 VITALS — OXYGEN SATURATION: 100 % | TEMPERATURE: 97.8 F

## 2021-03-21 PROCEDURE — H2035 A/D TX PROGRAM, PER HOUR: HCPCS | Mod: HQ

## 2021-03-21 PROCEDURE — 1002N00001 HC LODGING PLUS FACILITY CHARGE ADULT

## 2021-03-21 NOTE — GROUP NOTE
Group Therapy Documentation    PATIENT'S NAME: Saroj Summers  MRN:   [id number]  :   1960  ACCT. NUMBER: [id number]  DATE OF SERVICE: 3/21/21  START TIME:  8:45 AM  END TIME: 10:30 AM  FACILITATOR(S): Jayda Byrd LADC; Viky Wilson, BRIAN; Saroj Kennedy LADC  TOPIC: BEH Group Therapy  Number of patients attending the group: 23  Group Length:  2 Hours    Group Therapy Type: Health and wellbeing     Summary of Group / Topics Discussed:    Self-care activities      Group Attendance:  Attended group session    Patient's response to the group topic/interactions:  cooperative with task    Patient appeared to be Attentive and Engaged.        Client specific details: Herb was an active participant in staff RN lecture on HIV/AIDS and COVID-19.

## 2021-03-21 NOTE — GROUP NOTE
"Group Therapy Documentation    PATIENT'S NAME: Saroj Summers  MRN:   [id number]  :   1960  ACCT. NUMBER: [id number]  DATE OF SERVICE: 3/21/21  START TIME: 12:30 PM  END TIME:  1:30 PM  FACILITATOR(S): Jayda Byrd LADC; Saroj Kennedy LADC  TOPIC: BEH Group Therapy  Number of patients attending the group: 23  Group Length:  1 Hours    Group Therapy Type: Recovery strategies    Summary of Group / Topics Discussed:    Relationship/socialization      Group Attendance:  Attended group session    Patient's response to the group topic/interactions:  cooperative with task    Patient appeared to be Attentive and Engaged.        Client specific details: Herb was an active participant in Skills Group on \"Honesty\".    "

## 2021-03-22 ENCOUNTER — HOSPITAL ENCOUNTER (OUTPATIENT)
Dept: BEHAVIORAL HEALTH | Facility: CLINIC | Age: 61
End: 2021-03-22
Attending: FAMILY MEDICINE
Payer: COMMERCIAL

## 2021-03-22 PROCEDURE — H2035 A/D TX PROGRAM, PER HOUR: HCPCS | Mod: HQ

## 2021-03-22 PROCEDURE — 1002N00001 HC LODGING PLUS FACILITY CHARGE ADULT

## 2021-03-22 NOTE — GROUP NOTE
"Group Therapy Documentation    PATIENT'S NAME: Saroj Summers  MRN:   [id number]  :   1960  ACCT. NUMBER: [id number]  DATE OF SERVICE: 3/22/21  START TIME:  9:00 AM  END TIME: 11:00 AM  FACILITATOR(S): Lionel Mcfarland LADC  TOPIC: BEH Group Therapy  Number of patients attending the group: 7  Group Length:  2 Hours    Group Therapy Type: Recovery strategies    Summary of Group / Topics Discussed:    Recovery Principles      Group Attendance:  Attended group session    Patient's response to the group topic/interactions:  cooperative with task    Patient appeared to be Attentive.        Client specific details:  Herb attended AM group.They were attentive and engaged. They checked regarding how their relationships are changing now that they are in recovery. Patient also took part in a discussion regarding the buitrago rule and \"not judging a book by its cover\".   "

## 2021-03-22 NOTE — GROUP NOTE
Group Therapy Documentation    PATIENT'S NAME: Saroj Summers  MRN:   [id number]  :   1960  ACCT. NUMBER: [id number]  DATE OF SERVICE: 3/22/21  START TIME: 12:30 PM  END TIME:  2:30 PM  FACILITATOR(S): Elver Garrido LADC  TOPIC: BEH Group Therapy  Number of patients attending the group:  7  Group Length:  2 Hours    Group Therapy Type: Health and wellbeing     Summary of Group / Topics Discussed:    Spiritual Care      Group Attendance:  Attended group session    Patient's response to the group topic/interactions:  cooperative with task    Patient appeared to be Actively participating.        Client specific details:  Herb participated and interacted appropriately with peers and staff in PM group. No triggers to use noted or discussed.

## 2021-03-23 ENCOUNTER — HOSPITAL ENCOUNTER (OUTPATIENT)
Dept: BEHAVIORAL HEALTH | Facility: CLINIC | Age: 61
End: 2021-03-23
Attending: FAMILY MEDICINE
Payer: COMMERCIAL

## 2021-03-23 VITALS — OXYGEN SATURATION: 97 % | TEMPERATURE: 97.5 F

## 2021-03-23 VITALS — TEMPERATURE: 97.8 F | OXYGEN SATURATION: 97 %

## 2021-03-23 PROCEDURE — H2035 A/D TX PROGRAM, PER HOUR: HCPCS | Mod: HQ

## 2021-03-23 PROCEDURE — 1002N00001 HC LODGING PLUS FACILITY CHARGE ADULT

## 2021-03-23 NOTE — PROGRESS NOTES
"Patient:  Saroj Summers        Day Monday Tuesday Wednesday Thursday Friday Saturday Sulaiman   Group Hours   4 hours 4 hours 5 hours 4 hours 4 hours 4 hours 3 hours   Skills Hours   0 hours 1.0 hours 0 hours 1.0 hours 0 hours 0 hours 0 hours   Individual Session (LADC)    0 hours 0 hours 0 hours 0 hours 0 hours 0 hours 0 hours   Individual Session  (psychotherapy)   0 hours 0 hours 0 hours 0 hours 1 hours 0 hours 0 hours   Peer-led Recovery Group   1.0 hours 1.0 hours 1.0 hours 1.0 hours 1.0 hours 1.0 hours 1.0 hours                  Adult CD Progress Note and Treatment Plan Review     Attendance  Please refer to OP BEH CD Adult Attendance Record Documentation Flowsheet    Support group attended this week: Yes    Reporting sobriety: Yes    Treatment Plan     Treatment Plan Review competed on: 3/23/2021        Staff Members contributing: Elver Garrido [facility]; Lionel Mcfarland [facility]; Lily Silveira                         Received Supervision: Lily Silveira     Client: Pt contributed to goals and plan.    Client received copy of plan/revised plan: Pt's tx plan is in progress     Client agrees with plan/revised plan: Pt's tx plan is in progress     Changes to Treatment Plan: Pt's tx plan is in progress     New Goals added since last review: Pt's tx plan is in progress     Goals worked on since last review: Aftercare planning, sobriety, group therapy, build sober support network, psychoeducation. Pt reported that he has \"attended all meetings\" since being in [facility]       Strategies effective: Yes     Strategies need these changes:  Pt's tx plan is in progress    1) Care Coordination Activities: Pt needs to confirm aftercare options.  2) Medical, Mental Health, and other appointments the client attended: Pt reported seeing [facility] psychotherapist Daniela Macias this past week.  3) Medication issues: Pt reported no concerns at this time.  4) Physical and mental health problems: Pt reported no concerns at this time.  5) " "Any changes in Vulnerable Adult Status? No. If yes, add to treatment plan and individual abuse prevention plan.  6) Review and evaluation of the individual abuse prevention plan: Current IAPP for this program is adequate for this client.    ASAM Risk Rating:    Dimension 1 0: Pt reported 3/9/2021 last use date as 3/9/2021. Pt reported PAWS symptomatology this past week as: \"body pain, uneasiness, and anxiety.\" Pt will be monitored.     Dimension 2 0:  Pt denied having medical or medication issues this past week. Pt did not attend any healthcare appointments this past week and denied scheduling any appointments for the future. Pt reported that not feeling well after drinking fluids in order to take a UA. Staff  Reportedly offered patient go to the ER but he declined.   Dimension 3 2: Pt denied having suicidal thoughts at this time. Pt reported no significant changes in his mood or changes in his stress level this past week. Pt reported using \"group peers, reading book called \"Redefining Anxiety\" as his coping techniques for dealing with difficult emotions this past week. Pt reported no triggers to use this past week.  Patient attended Spiritual Care weekly group facilitated by Madeleine Rodriguez.     Dimension 4 0: Pt expresses internal motivation for change. Pt is active in group process, accepts and provides feedback, has good insight, and appears engaged. Pt is supportive of his group peers. Pt reported that looking at drinking as a \"death sentence\" are what motivated him to be sober and to stay in treatment this week.    Dimension 5 4: Pt reported that his cravings were at a 3 this past week on a scale of 1 to 10, 10 being the highest/ most severe. Pt reported that \"peer support and calling sponsor\" have been his coping skills he used to manage cravings this past week.    Dimension 6 4: Pt is exploring his aftercare options. Patient is effectively homeless. He attended the weekend Relationships workshop.     Guide " "to C-SSRS Risk Ratings   NO IDEATION:  with no active thoughts IDEATION: with a wish to die. IDEATION: with active thoughts. Risk Ratings   If Yes No No 0 - Very Low Risk   If NA Yes No 1 - Low Risk   If NA Yes Yes 2 - Low/moderate risk   IDEATION: associated thoughts of methods without intent or plan INTENT: Intent to follow through on suicide PLAN: Plan to follow through on suicide Risk Ratings cont...   If Yes No No 3 - Moderate Risk   If Yes Yes No 4 - High Risk   If Yes Yes Yes 5 - High Risk   The patient's ADDITIONAL RISK FACTORS and lack of PROTECTIVE FACTORS may increase their overall suicide risk ratings.     Pt's current risk rating: \"Very Low Risk\"    Any changes in Vulnerable Adult Status? No.    If yes, add to treatment plan and individual abuse prevention plan.    Family Involvement:   Pt reported that his family is supportive of his tx.     Data:   Pt offered feedback, had good insight, and patient actively participated in group. Pt shares openly during group check-in. Pt reported that \"walking\" has been his recreational activity he participated in this past week. Pt reported that he has gotten along \"good\" with his peers and staff this past week.      Pt is also gaining trust of peers and counselors.       Intervention:   Counselor feedback  Group feedback  Relapse prevention  Aftercare planning  Cognitive behavior therapy  Counselor feedback  Education  Emotional management  Motivational enhancement therapy   Twelve Step facilitation  Mental health education  Pt and counselor reviewed and signed ISP and assessment summary.      Assessment:   Stages of Change Model  Preparation     Appears/ Sounds:  Cooperative  Motivated  Engaged      Plan:  Focus on recovery environment  Monitor emotional/physical health    Continue group therapy, go to AA/NA meetings when available, work with sponsor, build sober support network, engage in daily structured activities, and have sober fun.            Lionel Mcfarland, " LADC

## 2021-03-23 NOTE — GROUP NOTE
Group Therapy Documentation    PATIENT'S NAME: Saroj Summers  MRN:   [id number]  :   1960  [id number]  DATE OF SERVICE: 3/23/21  START TIME:  3:00 PM  END TIME:  4:00 PM  FACILITATOR(S): Clementine Linn  TOPIC: BEH Group Therapy  Number of patients attending the group:  19  Group Length:  1 Hours    Group Therapy Type: Recovery strategies, Emotion processing, Daily living/independence skills, and Health and wellbeing     Summary of Group / Topics Discussed:    Recovery Principles and Relationship/socialization      Group Attendance:  Attended group session    Patient's response to the group topic/interactions:  cooperative with task    Patient appeared to be Actively participating, Attentive and Engaged.        Client specific details:  Patient was attentive and participative during group lecture.

## 2021-03-23 NOTE — GROUP NOTE
Group Therapy Documentation    PATIENT'S NAME: Saroj Summers  MRN:   [id number]  :   1960  ACCT. NUMBER: [id number]  DATE OF SERVICE: 3/23/21  START TIME: 12:30 PM  END TIME:  2:30 PM  FACILITATOR(S): Elver Garrido LADC  TOPIC: BEH Group Therapy  Number of patients attending the group:  7  Group Length:  2 Hours    Group Therapy Type: Emotion processing    Summary of Group / Topics Discussed:    Balanced lifestyle      Group Attendance:  Attended group session    Patient's response to the group topic/interactions:  cooperative with task    Patient appeared to be Actively participating.        Client specific details:  Herb participated and interacted appropriately with peers and staff in PM group. Triggers to use noted or discussed: resentment, guilt, and shame.

## 2021-03-23 NOTE — GROUP NOTE
Group Therapy Documentation    PATIENT'S NAME: Saroj Summers  MRN:   [id number]  :   1960  ACCT. NUMBER: [id number]  DATE OF SERVICE: 3/23/21  START TIME:  9:00 AM  END TIME: 11:00 AM  FACILITATOR(S): Aide Sheppard LADC  TOPIC: BEH Group Therapy  Number of patients attending the group:  6  Group Length:  2 hours    Group Therapy Type: Recovery strategies    Summary of Group / Topics Discussed:    Recovery Principles      Group Attendance:  Attended group session    Patient's response to the group topic/interactions:  cooperative with task    Patient appeared to be Actively participating, Attentive and Engaged.        Client specific details:  Herb attended morning group therapy session.  He participated in discussions about being able to see things more clearly and to feel emotions when not under the influence of substances, and who have been influential people in our lives.  Herb presented to the group his assignment on Understanding Depression and Addiction.

## 2021-03-24 ENCOUNTER — HOSPITAL ENCOUNTER (OUTPATIENT)
Dept: BEHAVIORAL HEALTH | Facility: CLINIC | Age: 61
End: 2021-03-24
Attending: FAMILY MEDICINE
Payer: COMMERCIAL

## 2021-03-24 VITALS — OXYGEN SATURATION: 96 % | TEMPERATURE: 97.9 F

## 2021-03-24 PROCEDURE — H2035 A/D TX PROGRAM, PER HOUR: HCPCS | Mod: HQ

## 2021-03-24 PROCEDURE — 1002N00001 HC LODGING PLUS FACILITY CHARGE ADULT

## 2021-03-24 NOTE — GROUP NOTE
Group Therapy Documentation    PATIENT'S NAME: Saroj Summers  MRN:   [id number]  :   1960  [id number]  DATE OF SERVICE: 3/24/21  START TIME:  8:30 AM  END TIME:  9:30 AM  FACILITATOR(S): Dimitrios Powell LADC; Gurvinder Malcolm MD  TOPIC: BEH Group Therapy  Number of patients attending the group:  7  Group Length:  1 Hours    Group Therapy Type: Recovery strategies and Medication education    Summary of Group / Topics Discussed:    Recovery Principles, Disease of addiction, and Medication management      Group Attendance:  Attended group session    Patient's response to the group topic/interactions:  cooperative with task    Patient appeared to be Attentive.        Client specific details:  Herb gave appropriate feedback..

## 2021-03-24 NOTE — GROUP NOTE
Group Therapy Documentation    PATIENT'S NAME: Saroj Summers  MRN:   [id number]  :   1960  ACCT. NUMBER: [id number]  DATE OF SERVICE: 3/24/21  START TIME:  9:45 AM  END TIME: 11:15 AM  FACILITATOR(S): Lionel Mcfarland LADC  TOPIC: BEH Group Therapy  Number of patients attending the group:  6  Group Length:  1.5 Hours    Group Therapy Type: Recovery strategies    Summary of Group / Topics Discussed:    Recovery Principles      Group Attendance:  Attended group session    Patient's response to the group topic/interactions:  cooperative with task    Patient appeared to be Attentive.        Client specific details:  Herb attended AM group. They were attentive and engaged. Patient took part in a peer's Drug/Alcohol Use History, asked questions, and gave feedback.

## 2021-03-24 NOTE — GROUP NOTE
Group Therapy Documentation    PATIENT'S NAME: Saroj Summers  MRN:   [id number]  :   1960  ACCT. NUMBER: [id number]  DATE OF SERVICE: 3/24/21  START TIME: 12:30 PM  END TIME:  2:30 PM  FACILITATOR(S): Elver Garrido LADC  TOPIC: BEH Group Therapy  Number of patients attending the group:  7  Group Length:  2 Hours    Group Therapy Type: Daily living/independence skills    Summary of Group / Topics Discussed:    Sober coping skills      Group Attendance:  Attended group session    Patient's response to the group topic/interactions:  cooperative with task    Patient appeared to be Actively participating.        Client specific details:  Herb participated and interacted appropriately with peers and staff in PM group. No triggers to use noted or discussed.

## 2021-03-25 ENCOUNTER — HOSPITAL ENCOUNTER (OUTPATIENT)
Dept: BEHAVIORAL HEALTH | Facility: CLINIC | Age: 61
End: 2021-03-25
Attending: FAMILY MEDICINE
Payer: COMMERCIAL

## 2021-03-25 VITALS — OXYGEN SATURATION: 100 % | TEMPERATURE: 97.8 F

## 2021-03-25 PROCEDURE — H2035 A/D TX PROGRAM, PER HOUR: HCPCS

## 2021-03-25 PROCEDURE — H2035 A/D TX PROGRAM, PER HOUR: HCPCS | Mod: HQ

## 2021-03-25 PROCEDURE — 1002N00001 HC LODGING PLUS FACILITY CHARGE ADULT

## 2021-03-25 NOTE — GROUP NOTE
"Group Therapy Documentation    PATIENT'S NAME: Saroj Summers  MRN:   [id number]  :   1960  ACCT. NUMBER: [id number]  DATE OF SERVICE: 3/25/21  START TIME: 12:30 PM  END TIME:  2:30 PM  FACILITATOR(S): Jayda Byrd LADC  TOPIC: BEH Group Therapy  Number of patients attending the group: 8  Group Length:  2 Hours    Group Therapy Type: Emotion processing    Summary of Group / Topics Discussed:    Sober coping skills and Disease of addiction      Group Attendance:  Attended group session    Patient's response to the group topic/interactions:  cooperative with task    Patient appeared to be Actively participating, Attentive and Engaged.        Client specific details: Herb was an active participant in afternoon group therapy session. He presented his \"5 years using vs. 5 years sober\" assignment and was receptive to feedback from his peers.  "
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
## 2021-03-25 NOTE — GROUP NOTE
Group Therapy Documentation    PATIENT'S NAME: Saroj Summers  MRN:   [id number]  :   1960  ACCT. NUMBER: [id number]  DATE OF SERVICE: 3/25/21  START TIME: 12:30 PM  END TIME:  2:30 PM  FACILITATOR(S): Elver Garrido LADC  TOPIC: BEH Group Therapy  Number of patients attending the group:  7  Group Length:  2 Hours    Group Therapy Type: Emotion processing    Summary of Group / Topics Discussed:    Emotions/expression      Group Attendance:  Attended group session    Patient's response to the group topic/interactions:  cooperative with task    Patient appeared to be Actively participating.        Client specific details:  Herb participated and interacted appropriately with peers and staff in AM group. No triggers to use noted or discussed.

## 2021-03-26 ENCOUNTER — HOSPITAL ENCOUNTER (OUTPATIENT)
Dept: BEHAVIORAL HEALTH | Facility: CLINIC | Age: 61
End: 2021-03-26
Attending: FAMILY MEDICINE
Payer: COMMERCIAL

## 2021-03-26 VITALS — OXYGEN SATURATION: 97 % | TEMPERATURE: 97.6 F

## 2021-03-26 PROCEDURE — H2035 A/D TX PROGRAM, PER HOUR: HCPCS | Mod: HQ

## 2021-03-26 PROCEDURE — 1002N00001 HC LODGING PLUS FACILITY CHARGE ADULT

## 2021-03-26 NOTE — GROUP NOTE
Group Therapy Documentation    PATIENT'S NAME: Saroj Summers  MRN:   [id number]  :   1960  ACCT. NUMBER: [id number]  DATE OF SERVICE: 3/26/21  START TIME: 12:30 PM  END TIME:  2:30 PM  FACILITATOR(S): Elver Garrido LADC  TOPIC: BEH Group Therapy  Number of patients attending the group:  8  Group Length:  2 Hours    Group Therapy Type: Daily living/independence skills    Summary of Group / Topics Discussed:    Leisure explorations/use of leisure time      Group Attendance:  Attended group session    Patient's response to the group topic/interactions:  cooperative with task    Patient appeared to be Actively participating.        Client specific details:  Herb participated and interacted appropriately with peers and staff in PM group. No triggers to use noted or discussed.

## 2021-03-26 NOTE — GROUP NOTE
Group Therapy Documentation    PATIENT'S NAME: Saroj Summers  MRN:   [id number]  :   1960  ACCT. NUMBER: [id number]  DATE OF SERVICE: 3/26/21  START TIME:  9:00 AM  END TIME: 11:00 AM  FACILITATOR(S): Elver Garrido LADC  TOPIC: BEH Group Therapy  Number of patients attending the group:  8  Group Length:  2 Hours    Group Therapy Type: Emotion processing    Summary of Group / Topics Discussed:    Relationship/socialization      Group Attendance:  Attended group session    Patient's response to the group topic/interactions:  cooperative with task    Patient appeared to be Actively participating.        Client specific details:  Herb participated and interacted appropriately with peers and staff in AM group. No triggers to use noted or discussed.

## 2021-03-27 ENCOUNTER — HOSPITAL ENCOUNTER (OUTPATIENT)
Dept: BEHAVIORAL HEALTH | Facility: CLINIC | Age: 61
End: 2021-03-27
Attending: FAMILY MEDICINE
Payer: COMMERCIAL

## 2021-03-27 VITALS — OXYGEN SATURATION: 96 % | TEMPERATURE: 97.6 F

## 2021-03-27 PROCEDURE — H2035 A/D TX PROGRAM, PER HOUR: HCPCS | Mod: HQ

## 2021-03-27 PROCEDURE — 1002N00001 HC LODGING PLUS FACILITY CHARGE ADULT

## 2021-03-27 NOTE — GROUP NOTE
Group Therapy Documentation    PATIENT'S NAME: Saroj Summers  MRN:   [id number]  :   1960  ACCT. NUMBER: [id number]  DATE OF SERVICE: 3/27/21  START TIME:  1:00 PM  END TIME:  3:00 PM  FACILITATOR(S): Lionel Mcfarland LADC; Cortney Toscano LADC; Pipe Alcantara LADC  TOPIC: BEH Group Therapy  Number of patients attending the group:  26  Group Length:  2 Hours    Group Therapy Type: Recovery strategies    Summary of Group / Topics Discussed:    Recovery Principles      Group Attendance:  Attended group session    Patient's response to the group topic/interactions:  cooperative with task    Patient appeared to be Attentive.        Client specific details:  Herb attended PM workshop session. Pt participated in a group project that looked at their personal strengths. They then discussed how those strengths can benefit their relationships in recovery.

## 2021-03-27 NOTE — GROUP NOTE
Group Therapy Documentation    PATIENT'S NAME: Saroj Summers  MRN:   [id number]  :   1960  ACCT. NUMBER: [id number]  DATE OF SERVICE: 3/27/21  START TIME:  9:00 AM  END TIME: 11:00 AM  FACILITATOR(S): Cortney Toscano LADC; Lionel Mcfarland LADC; Pipe Alcantara LADC  TOPIC: BEH Group Therapy  Number of patients attending the group:  26  Group Length:  2 Hours    Group Therapy Type: Recovery strategies    Summary of Group / Topics Discussed:    Relationship/socialization      Group Attendance:  Attended group session    Patient's response to the group topic/interactions:  cooperative with task    Patient appeared to be Attentive.        Client specific details: Pt was appropriate and attentive in group, during lecture on Love in Relationship, and Codependency, this AM.

## 2021-03-28 ENCOUNTER — HOSPITAL ENCOUNTER (OUTPATIENT)
Dept: BEHAVIORAL HEALTH | Facility: CLINIC | Age: 61
End: 2021-03-28
Attending: FAMILY MEDICINE
Payer: COMMERCIAL

## 2021-03-28 VITALS — OXYGEN SATURATION: 96 % | TEMPERATURE: 96.5 F

## 2021-03-28 PROCEDURE — 1002N00001 HC LODGING PLUS FACILITY CHARGE ADULT

## 2021-03-28 PROCEDURE — H2035 A/D TX PROGRAM, PER HOUR: HCPCS | Mod: HQ

## 2021-03-28 NOTE — GROUP NOTE
Group Therapy Documentation    PATIENT'S NAME: Saroj Summers  MRN:   [id number]  :   1960  ACCT. NUMBER: [id number]  DATE OF SERVICE: 3/28/21  START TIME:  9:00 AM  END TIME: 11:00 AM  FACILITATOR(S): Lionel Mcfarland [facility]; Cortney Toscano [facility]  TOPIC: BEH Group Therapy  Number of patients attending the group:  26  Group Length:  2 Hours    Group Therapy Type: Recovery strategies    Summary of Group / Topics Discussed:    Recovery Principles and Self-care activities      Group Attendance:  Attended group session    Patient's response to the group topic/interactions:  cooperative with task    Patient appeared to be Attentive.        Client specific details:  Herb attended AM group. They took part in a lecture discussion on laughter, TB, Hep A-C. Patient was attentive and engaged.

## 2021-03-28 NOTE — GROUP NOTE
Group Therapy Documentation    PATIENT'S NAME: Saroj Summers  MRN:   [id number]  :   1960  ACCT. NUMBER: [id number]  DATE OF SERVICE: 3/28/21  START TIME: 12:30 AM  END TIME:  1:30 PM  FACILITATOR(S): Lionel Mcfarland LADC; Cortney Toscano LADC  TOPIC: BEH Group Therapy  Number of patients attending the group: 26  Group Length:  2 Hours    Group Therapy Type: Recovery strategies    Summary of Group / Topics Discussed:    Relationship/socialization      Group Attendance:  Attended group session    Patient's response to the group topic/interactions:  cooperative with task    Patient appeared to be Actively participating and Engaged.        Client specific details: Pt was appropriate and attentive in group, during Skills lecture this PM on  Relationship .

## 2021-03-29 ENCOUNTER — HOSPITAL ENCOUNTER (OUTPATIENT)
Dept: BEHAVIORAL HEALTH | Facility: CLINIC | Age: 61
End: 2021-03-29
Attending: FAMILY MEDICINE
Payer: COMMERCIAL

## 2021-03-29 VITALS — OXYGEN SATURATION: 100 % | TEMPERATURE: 98.1 F

## 2021-03-29 PROCEDURE — 1002N00001 HC LODGING PLUS FACILITY CHARGE ADULT

## 2021-03-29 PROCEDURE — H2035 A/D TX PROGRAM, PER HOUR: HCPCS | Mod: HQ

## 2021-03-29 NOTE — GROUP NOTE
Group Therapy Documentation    PATIENT'S NAME: Saroj Summers  MRN:   [id number]  :   1960  [id number]  DATE OF SERVICE: 3/29/21  START TIME:  9:00 AM  END TIME: 11:00 AM  FACILITATOR(S): Jayda Byrd LADC  TOPIC: BEH Group Therapy  Number of patients attending the group: 8  Group Length:  2 Hours    Group Therapy Type: Emotion processing    Summary of Group / Topics Discussed:    Sober coping skills, Emotions/expression, and Relapse prevention      Group Attendance:  Attended group session    Patient's response to the group topic/interactions:  cooperative with task    Patient appeared to be Actively participating, Attentive and Engaged.        Client specific details: Herb was an active participant in morning group therapy session. He shared his fears around seeking out sober housing, but reported he is hopeful and dedicated to making the necessary lifestyle changes he needs to remain sober.

## 2021-03-29 NOTE — PROGRESS NOTES
"[facility] Weekly Treatment Plan Review    ATTENDANCE for the following date span: 3/22/21-3/28/21     Day Monday Tuesday Wednesday Thursday Friday Saturday Sunday   Group Hours   4 4 4 3 4 4 2   Skills Hours    1  1   1   Individual Session (LADC)            Individual Session  (psychotherapy)      1      Peer-led Recovery Group   1 1 1 1 1 1 1     Patient: Saroj Summers            Adult CD Progress Note and Treatment Plan Review     Attendance  Please refer to OP BEH CD Adult Attendance Record Documentation Flowsheet    Support group attended this week: Yes    Reporting sobriety:  Yes    Treatment Plan     Treatment Plan Review completed on: 3/29/21    Client preferred learning style: Visual  Hands on  Verbal  Demonstration    Staff Members contributing: Elver Garrido [facility]; ANA Aggarwal; Meera Sheppard, Intern; SHANNAN Stokes                      Received Supervision: No    Client: Patient contributed to goals and plan.    Client received copy of plan/revised plan: Yes    Client agrees with plan/revised plan: Yes    Changes to Treatment Plan: None    New Goals added since last review: No additional goals added at this time    Goals worked on since last review: Sobriety, aftercare planning, group therapy, tx plan assignments, building a sober support network, psychoeducation. Patient reports he has completed his \"Use History\" assignment this past week.    Strategies effective: Yes    Strategies need these changes: No changes needed at this time    1) Care Coordination Activities: Patient is currently exploring sober housing opportunities with  staff.  2) Medical, Mental Health and other appointments the client attended: Patient attended an individual therapy session with staff therapist MIGUEL A Chaney.  3) Medication issues: None reported.  4) Physical and mental health problems: None reported.  5) Any changes in Vulnerable Adult Status?  No If yes, add to treatment plan and " "individual abuse prevention plan.  6) Review and evaluation of the individual abuse prevention plan: Current IAPP for this program is adequate for this client    ASAM Risk Rating:    Dimension 1, 0: Patient reports his last date of use as 3/9/21. Patient denies any withdrawal symptoms that would interfere with full participation in treatment programming at this time. Patient will continue to be monitored throughout treatment.     Dimension 2, 0: Patient denies any biomedical concerns that would interfere with full participation in treatment programming at this time. Patient reports that he is currently medication compliant and able to access medical aid as needed.  Patient will continue to be monitored throughout treatment. Patient attended staff RN lecture on \"TB/Hep C\".    Dimension 3, 2: Patient denies any significant changes to his mood or stress level this past week. Patient met with staff therapist Daniela Macias for an individual therapy session. Patient reports \"reading my book on stress\" as his primary coping skills he uses to manage stress and difficult emotions.  Patient denies any suicidal thoughts or ideations at this time. Patient will continue to be monitored throughout treatment.     Dimension 4, 0: Patient verbally reports being motivated for long-term recovery. Patient reports \"my health and my family\" as his primary motivation to stay sober and in treatment this week.  Patient's group attendance and participation have been positive, and he continues to fully engage in the treatment process. He expresses internal motivation for change and a willingness to make lifestyle changes to support his recovery.      Dimension 5, 4: Patient rated his cravings this week on a scale from 1(low) to 10(high) as a \"3\". He denies any specific triggers to use this past week.  He reports \"be mindful of them\" as the coping skills he has utilized to manage these cravings. Patient attended Spiritual Care group " "facilitated by Madeleine Rodriguez.    Dimension 6, 4: Patient is currently exploring his aftercare options, including sober housing. Patient attended weekend Relationships workshop.  He continues to attend 12-step meetings via Zoom while at [facility].    Guide to Risk Ratings for Suicidality:   IDEATION: Active thoughts of suicide? INTENT: Intent to follow on suicide? PLAN: Plan to follow through on suicide? Level of Risk:   IF Yes Yes Yes Patient = High Emergent   IF Yes Yes No Patient = High Urgent/Non-Emergent   IF Yes No No Patient = Moderate Non-Urgent   IF No No   No Patient = Low Risk   The patient's ADDITIONAL RISK FACTORS and lack of PROTECTIVE FACTORS may increase their overall suicide risk ratings.     Patient's/client's current risk rating:  Very Low Risk    Family Involvement:   Patient reports having contact with supportive family members.    Data:   offered feedback good insight client did actively participate   Patient reports that going for \"walks\" has been his leisure activity he has engaged in this past week. He also denies any concerns with peers or staff. Patient is increasing his willingness to surrender to recommendations from staff, \"quieting his ego\".    Intervention:   Aftercare planning  Behavior modification  Cognitive Behavioral Therapy  Counselor feedback  Education  Emotional management  Group feedback  Motivational Enhancement Therapy  Relapse prevention  Twelve Step facilitation  Mental health education    Assessment:   Stages of Change Model  Preparation/Determination    Appears/Sounds:  Cooperative  Motivated  Engaged    Plan:  Focus on recovery environment  Monitor emotional/physical health  Continue working through treatment plan goals.       SHANNAN Stokes  "

## 2021-03-29 NOTE — GROUP NOTE
Group Therapy Documentation    PATIENT'S NAME: Saroj Summers  MRN:   [id number]  :   1960  ACCT. NUMBER: [id number]  DATE OF SERVICE: 3/29/21  START TIME: 12:30 PM  END TIME:  2:30 PM  FACILITATOR(S): Elver Garrido LADC  TOPIC: BEH Group Therapy  Number of patients attending the group:  8  Group Length:  2 Hours    Group Therapy Type: Health and wellbeing     Summary of Group / Topics Discussed:    Spiritual Care      Group Attendance:  Attended group session    Patient's response to the group topic/interactions:  cooperative with task    Patient appeared to be Actively participating.        Client specific details:  Herb participated and interacted appropriately with peers and staff in PM group. No triggers to use noted or discussed.

## 2021-03-30 ENCOUNTER — HOSPITAL ENCOUNTER (OUTPATIENT)
Dept: BEHAVIORAL HEALTH | Facility: CLINIC | Age: 61
End: 2021-03-30
Attending: FAMILY MEDICINE
Payer: COMMERCIAL

## 2021-03-30 VITALS — OXYGEN SATURATION: 96 % | TEMPERATURE: 97.9 F

## 2021-03-30 PROCEDURE — H2035 A/D TX PROGRAM, PER HOUR: HCPCS | Mod: HQ

## 2021-03-30 PROCEDURE — 1002N00001 HC LODGING PLUS FACILITY CHARGE ADULT

## 2021-03-30 NOTE — GROUP NOTE
Group Therapy Documentation    PATIENT'S NAME: Saroj Summers  MRN:   [id number]  :   1960  ACCT. NUMBER: [id number]  DATE OF SERVICE: 3/30/21  START TIME: 12:30 PM  END TIME:  2:30 PM  FACILITATOR(S): Elver Garrido LADC  TOPIC: BEH Group Therapy  Number of patients attending the group:  7  Group Length:  2 Hours    Group Therapy Type: Emotion processing    Summary of Group / Topics Discussed:    Cognitive behavioral therapy skills      Group Attendance:  Attended group session    Patient's response to the group topic/interactions:  cooperative with task    Patient appeared to be Actively participating.        Client specific details:  Herb participated and interacted appropriately with peers and staff in AM group. No triggers to use noted or discussed.

## 2021-03-30 NOTE — GROUP NOTE
Group Therapy Documentation    PATIENT'S NAME: Saroj Summers  MRN:   [id number]  :   1960  ACCT. NUMBER: [id number]  DATE OF SERVICE: 3/30/21  START TIME:  3:00 PM  END TIME:  4:00 PM  FACILITATOR(S): Denice Hearn LAD; Saroj Kennedy [facility]; Cortney Toscano [facility]  TOPIC: BEH Group Therapy  Number of patients attending the group: 27  Group Length:  2 Hours    Group Therapy Type: Daily living/independence skills    Summary of Group / Topics Discussed:    Sober coping skills      Group Attendance:  Attended group session    Patient's response to the group topic/interactions:  cooperative with task    Patient appeared to be Engaged.        Client specific details: Pt was appropriate and attentive in group, during Skills lecture this PM on Things we can [place] with and those we cannot.

## 2021-03-30 NOTE — GROUP NOTE
Group Therapy Documentation    PATIENT'S NAME: Saroj Summers  MRN:   [id number]  :   1960  ACCT. NUMBER: [id number]  DATE OF SERVICE: 3/30/21  START TIME: 12:30 PM  END TIME:  2:30 PM  FACILITATOR(S): Elver Garrido LADC  TOPIC: BEH Group Therapy  Number of patients attending the group:  7  Group Length:  2 Hours    Group Therapy Type: Recovery strategies    Summary of Group / Topics Discussed:    Sober coping skills      Group Attendance:  Attended group session    Patient's response to the group topic/interactions:  cooperative with task    Patient appeared to be Actively participating.        Client specific details:  Herb participated and interacted appropriately with peers and staff in PM group. No triggers to use noted or discussed.

## 2021-03-31 ENCOUNTER — HOSPITAL ENCOUNTER (OUTPATIENT)
Dept: BEHAVIORAL HEALTH | Facility: CLINIC | Age: 61
End: 2021-03-31
Attending: FAMILY MEDICINE
Payer: COMMERCIAL

## 2021-03-31 VITALS — OXYGEN SATURATION: 100 % | TEMPERATURE: 98.1 F

## 2021-03-31 PROCEDURE — H2035 A/D TX PROGRAM, PER HOUR: HCPCS | Mod: HQ

## 2021-03-31 PROCEDURE — 1002N00001 HC LODGING PLUS FACILITY CHARGE ADULT

## 2021-03-31 NOTE — GROUP NOTE
Group Therapy Documentation    PATIENT'S NAME: Saroj Summers  MRN:   [id number]  :   1960  [id number]  DATE OF SERVICE: 3/31/21  START TIME:  9:30 AM  END TIME: 11:30 AM  FACILITATOR(S): Saroj Kennedy LADC  TOPIC: BEH Group Therapy  Number of patients attending the group:  7  Group Length:  2 Hours    Group Therapy Type: Recovery strategies, Emotion processing, and Daily living/independence skills    Summary of Group / Topics Discussed:    Recovery Principles, Sober coping skills, Relationship/socialization, Cognitive behavioral therapy skills, Disease of addiction, Emotions/expression, and Relapse prevention      Group Attendance:  Attended group session    Patient's response to the group topic/interactions:  cooperative with task and discussed personal experience with topic    Patient appeared to be Actively participating, Attentive and Engaged.        Client specific details:  Patient appears quite engaged, open and encouraging with his peers. He has good insight into the nature of addiction and recovery.

## 2021-03-31 NOTE — GROUP NOTE
Group Therapy Documentation    PATIENT'S NAME: Saroj Summers  MRN:   [id number]  :   1960  ACCT. NUMBER: [id number]  DATE OF SERVICE: 3/31/21  START TIME: 12:30 PM  END TIME:  2:30 PM  FACILITATOR(S): Elver Garrido LADC  TOPIC: BEH Group Therapy  Number of patients attending the group:  8  Group Length:  2 Hours    Group Therapy Type: Recovery strategies    Summary of Group / Topics Discussed:    Disease of addiction      Group Attendance:  Attended group session    Patient's response to the group topic/interactions:  cooperative with task    Patient appeared to be Actively participating.        Client specific details:  Herb participated and interacted appropriately with peers and staff in PM group. No triggers to use noted or discussed. Pt presented his Use Hx assignment in PM group.

## 2021-03-31 NOTE — PROGRESS NOTES
Pt presented his Use Hx assignment in PM group. Pt described his early, middle, and late stages of use and associated consequences. Pt was open to feedback and questions form group peers and counselor.

## 2021-03-31 NOTE — GROUP NOTE
Group Therapy Documentation    PATIENT'S NAME: Saroj Summers  MRN:   [id number]  :   1960  ACCT. NUMBER: [id number]  DATE OF SERVICE: 3/31/21  START TIME:  8:30 AM  END TIME:  9:30 AM  FACILITATOR(S): Elver Garrido LADC; Nikolai Baxter, PhD LP  TOPIC: BEH Group Therapy  Number of patients attending the group:  7  Group Length:  1 Hours    Group Therapy Type: Recovery strategies    Summary of Group / Topics Discussed:    Emotions/expression      Group Attendance:  Attended group session    Patient's response to the group topic/interactions:  cooperative with task    Patient appeared to be Actively participating.        Client specific details:  Herb gave appropriate feedback.

## 2021-04-01 ENCOUNTER — HOSPITAL ENCOUNTER (OUTPATIENT)
Dept: BEHAVIORAL HEALTH | Facility: CLINIC | Age: 61
End: 2021-04-01
Attending: FAMILY MEDICINE
Payer: COMMERCIAL

## 2021-04-01 VITALS — OXYGEN SATURATION: 97 % | TEMPERATURE: 98.3 F

## 2021-04-01 PROCEDURE — H2035 A/D TX PROGRAM, PER HOUR: HCPCS | Mod: HQ

## 2021-04-01 PROCEDURE — H2035 A/D TX PROGRAM, PER HOUR: HCPCS

## 2021-04-01 PROCEDURE — 1002N00001 HC LODGING PLUS FACILITY CHARGE ADULT

## 2021-04-01 NOTE — GROUP NOTE
Group Therapy Documentation    PATIENT'S NAME: Saroj Summers  MRN:   [id number]  :   1960  [id number]  DATE OF SERVICE: 21  START TIME:  9:00 AM  END TIME: 11:00 AM  FACILITATOR(S): Jayda Byrd LADC  TOPIC: BEH Group Therapy  Number of patients attending the group:  8  Group Length:  2 Hours    Group Therapy Type: Emotion processing    Summary of Group / Topics Discussed:    Sober coping skills and Emotions/expression      Group Attendance:  Attended group session    Patient's response to the group topic/interactions:  cooperative with task    Patient appeared to be Actively participating, Attentive and Engaged.        Client specific details:  Herb was present for one hour of group session, as he was excused to attend an individual therapy appointment. He was an active participant in group process.

## 2021-04-01 NOTE — GROUP NOTE
Group Therapy Documentation    PATIENT'S NAME: Saroj Summers  MRN:   [id number]  :   1960  ACCT. NUMBER: [id number]  DATE OF SERVICE: 21  START TIME:  3:00 PM  END TIME:  4:00 PM  FACILITATOR(S): Jayda Byrd LADC; Mercy Salas LADC; Denice Hearn LADC  TOPIC: BEH Group Therapy  Number of patients attending the group: 23  Group Length:  1 Hours    Group Therapy Type: Recovery strategies    Summary of Group / Topics Discussed:    Disease of addiction      Group Attendance:  Attended group session    Patient's response to the group topic/interactions:  cooperative with task    Patient appeared to be Attentive and Engaged.        Client specific details: Herb participated in Skills Group lecture on gambling disorder and personal finance by Mercy Salas.

## 2021-04-01 NOTE — GROUP NOTE
Group Therapy Documentation    PATIENT'S NAME: Saroj Summers  MRN:   [id number]  :   1960  ACCT. NUMBER: [id number]  DATE OF SERVICE: 21  START TIME: 12:30 PM  END TIME:  2:30 PM  FACILITATOR(S): Elver Garrido LADC  TOPIC: BEH Group Therapy  Number of patients attending the group:  8  Group Length:  2 Hours    Group Therapy Type: Health and wellbeing     Summary of Group / Topics Discussed:    Mindfulness/Relaxation      Group Attendance:  Attended group session    Patient's response to the group topic/interactions:  cooperative with task    Patient appeared to be Actively participating.        Client specific details:  Herb participated and interacted appropriately with peers and staff in PM group. No triggers to use noted or discussed.

## 2021-04-01 NOTE — PROGRESS NOTES
INDIVIDUAL SESSION SUMMARY    D) Met with client on 4/1/21 from 9:00-10:00am. Client is in the [facility]. Client discussed new insights into the roots of his anxiety going back to childhood as well as his relationship patterns. Client reported that he is seeking sober living in [place], [place] and is a little nervous about telling his GF that he'll be away for another 3 months. Client spoke of his future plans including returning to school and settling down in [place] or [place]. Client spoke of his desire to find a new sense of purpose.     I) Individual session with client. Spoke about using this time to build a sober support network and look into new opportunities he can do service work and get re-engaged in the sober community.     A) Client appears to have unresolved grief and is struggling with a lack of purpose. Client appears to to function well when he has a daily routine, sober support, service work and a sense of purpose.     P) No future sessions scheduled.     Daniela Macias, LMFT  4/1/2021

## 2021-04-02 ENCOUNTER — HOSPITAL ENCOUNTER (OUTPATIENT)
Dept: BEHAVIORAL HEALTH | Facility: CLINIC | Age: 61
End: 2021-04-02
Attending: FAMILY MEDICINE
Payer: COMMERCIAL

## 2021-04-02 VITALS — TEMPERATURE: 98.1 F | OXYGEN SATURATION: 94 %

## 2021-04-02 PROCEDURE — H2035 A/D TX PROGRAM, PER HOUR: HCPCS | Mod: HQ

## 2021-04-02 PROCEDURE — 1002N00001 HC LODGING PLUS FACILITY CHARGE ADULT

## 2021-04-02 NOTE — IP AVS SNAPSHOT
Medication List       Patient: WENDY SOTO   : 1960   Physician: Kranthi Noble           This is your record.  Keep this with you and show to your community pharmacist(s) and physician(s) at each visit.     Allergies:  NKA [NO KNOWN ALLERGIES] - (reactions not documented)               Medications  Valid as of: 2021 -  8:54 AM    Generic Name Brand Name Tablet Size Instructions for use    Gabapentin NEURONTIN 300 MG Take 2 capsules (600 mg) by mouth 3 times daily        hydrOXYzine HCl ATARAX 25 MG Take 1 tablet (25 mg) by mouth every 8 hours as needed for anxiety        Losartan Potassium COZAAR 50 MG Take 1 tablet (50 mg) by mouth daily        Methocarbamol ROBAXIN 500 MG Take 1 tablet (500 mg) by mouth 4 times daily as needed for muscle spasms        Multiple Vitamins-Minerals THERA-VIT-M  Take 1 tablet by mouth daily        Nicotine NICODERM CQ 21 MG/24HR Place 1 patch onto the skin daily        Nicotine Polacrilex NICORETTE 4 MG Place 1 each (4 mg) inside cheek as needed for smoking cessation        Pantoprazole Sodium PROTONIX 40 MG Take 1 tablet (40 mg) by mouth every morning (before breakfast)        Thiamine HCl B-1 100 MG Take 1 tablet (100 mg) by mouth daily        traZODone HCl DESYREL 50 MG Take 1 tablet (50 mg) by mouth nightly as needed for sleep        .           .           .           .

## 2021-04-02 NOTE — GROUP NOTE
Group Therapy Documentation    PATIENT'S NAME: Saroj Summers  MRN:   [id number]  :   1960  ACCT. NUMBER: [id number]  DATE OF SERVICE: 21  START TIME: 12:30 PM  END TIME:  2:30 PM  FACILITATOR(S): Saroj Kennedy LADC  TOPIC: BEH Group Therapy  Number of patients attending the group:  8  Group Length:  2 Hours    Group Therapy Type: Daily living/independence skills    Summary of Group / Topics Discussed:    Sober coping skills, Relationship/socialization, and Disease of addiction      Group Attendance:  Attended group session    Patient's response to the group topic/interactions:  cooperative with task    Patient appeared to be Attentive and Engaged.        Client specific details:  Patient appeared to appreciate group topic..

## 2021-04-02 NOTE — GROUP NOTE
Group Therapy Documentation    PATIENT'S NAME: Saroj Summers  MRN:   [id number]  :   1960  ACCT. NUMBER: [id number]  DATE OF SERVICE: 21  START TIME:  9:00 AM  END TIME: 11:00 AM  FACILITATOR(S): Elver Garrido LADC  TOPIC: BEH Group Therapy  Number of patients attending the group:  8  Group Length:  2 Hours    Group Therapy Type: Recovery strategies    Summary of Group / Topics Discussed:    Disease of addiction      Group Attendance:  Attended group session    Patient's response to the group topic/interactions:  cooperative with task    Patient appeared to be Actively participating.        Client specific details:  Herb participated and interacted appropriately with peers and staff in AM group. No triggers to use noted or discussed.

## 2021-04-02 NOTE — IP AVS SNAPSHOT
After Visit Summary Template Not Found    This Print Group is only intended to be used in the After Visit Summary and can only be used in a report that uses a released After Visit Summary Template.                       MRN:[id number]                      After Visit Summary   4/2/2021    Saroj Summers    MRN: [id number]           Visit Information        Provider Department      4/2/2021  7:00 AM ADULT LODGING PLUS A [facility]        Your next 10 appointments already scheduled    Apr 03, 2021  7:00 AM  Treatment with ADULT LODGING PLUS A  [facility] ([facility] ) [address] [place] [address]  [phone]      Apr 04, 2021  7:00 AM  Treatment with ADULT LODGING PLUS A  [facility] ([facility] ) [address] [place] [address]  [phone]      Apr 05, 2021  7:00 AM  Treatment with ADULT LODGING PLUS A  [facility] ([facility] ) [address] [place] [address]  [phone]      Apr 06, 2021  7:00 AM  Treatment with ADULT LODGING PLUS A  [facility] ([facility] ) [address] [place] [address]  [phone]      Apr 07, 2021  7:00 AM  Treatment with ADULT LODGING PLUS A  [facility] ([facility] ) [address] [place] [address]  [phone]      Apr 08, 2021  7:00 AM  Treatment with ADULT LODGING PLUS A  [facility] ([facility]  "[facility] ) [address] [address] [phone]         MyChart Information    [employer] lets you send messages to your doctor, view your test results, renew your prescriptions, schedule appointments and more. To sign up, go to [email or web address] . Click on \"Log in\" on the left side of the screen, which will take you to the Welcome page. Then click on \"Sign up Now\" on the right side of the page.     You will be asked to enter the access code listed below, as well as some personal information. Please follow the directions to create your username and password.     Your access code is: [email or web address]  Expires: 2021 12:03 PM     Your access code will  in 60 days. If you need help or a new code, please call your   [facility] clinic or [phone].       Care EveryWhere ID    This is your Care EveryWhere ID. This could be used by other organizations to access your [facility] medical records  [id number]       Equal Access to Services    LAURY SHULTZ : Hadii sarai Cook, wayoli ibarra, qaelmo jimenez, gosia edmondson. So [facility] [phone].    ATENCIÓN: Si habla español, tiene a ervin disposición servicios gratuitos de asistencia lingüística. Abena al [phone].    We comply with applicable federal and state civil rights laws, including the [place] Human Rights Act. We do not discriminate on the basis of race, color, creed, Anabaptist, national origin, marital status, age, disability, sex, sexual orientation, or gender identity.    If you would like an itemization of your charges they will now be available in [employer] 30 days after discharge. To access the itemized statements in [employer] go to billing/billing summary. From there select view account. There will be multiple tabs showing an overview of your account, detail, payments, and communications. From the communications tab you can see your " monthly statements, your itemized statements, and any billing letters generated for your account. If you do not have a [employer] account and need help getting access please contact [employer] support at [phone].  If you would prefer to have your itemized statements mailed please contact our automated itemized bill request line at [phone] option  2.

## 2021-04-03 ENCOUNTER — HOSPITAL ENCOUNTER (OUTPATIENT)
Dept: BEHAVIORAL HEALTH | Facility: CLINIC | Age: 61
End: 2021-04-03
Attending: FAMILY MEDICINE
Payer: COMMERCIAL

## 2021-04-03 VITALS — OXYGEN SATURATION: 97 % | TEMPERATURE: 98.1 F

## 2021-04-03 PROCEDURE — H2035 A/D TX PROGRAM, PER HOUR: HCPCS | Mod: HQ

## 2021-04-03 PROCEDURE — 1002N00001 HC LODGING PLUS FACILITY CHARGE ADULT

## 2021-04-03 NOTE — GROUP NOTE
"Group Therapy Documentation    PATIENT'S NAME: Saroj Summers  MRN:   [id number]  :   1960  ACCT. NUMBER: [id number]  DATE OF SERVICE: 21  START TIME: 12:30 PM  END TIME:  2:30 PM  FACILITATOR(S): Sasha Holland LADC; Clementine Linn  TOPIC: BEH Group Therapy  Number of patients attending the group:  8  Group Length:  2 Hours    Group Therapy Type: Recovery strategies    Summary of Group / Topics Discussed:    Relapse prevention      Group Attendance:  Attended group session    Patient's response to the group topic/interactions:  cooperative with task    Patient appeared to be Engaged.        Client specific details:  The patient was an active participant during the workshop activities on the \"Voice of Addiction\" and \"Rituals.\"   "

## 2021-04-03 NOTE — PROGRESS NOTES
Nursing Discharge Planning Meeting    Writer completed discharge planning meeting with patient. Discharge is planned for 4/9/21    Discussed appropriate follow up care to manage FABIAN, MI and Medical and to obtain medication refills. Patient given a copy of their current medications for reference. Questions were answered at this time and the patient verbalized an understanding of the post-discharge follow up plan.    Patient to schedule an appointment with their PCP Dr. Gardner    Continue to support patient in discharge planning as needed to assure appropriate continuity of care.     Tobacco Cessation  Patient participated in the nicotine replacement therapy for tobacco cessation or reduction during their treatment programming: Yes Decreased tobacco use with NRT products       The patient was provided with community resources for follow-up to continue tobacco cessation support once in the community. Also the patient was encoruaged to discuss their tobacco cessation efforts with the primary care provider.

## 2021-04-03 NOTE — GROUP NOTE
Group Therapy Documentation    PATIENT'S NAME: Saroj Summers  MRN:   [id number]  :   1960  [id number]  DATE OF SERVICE: 21  START TIME:  9:00 AM  END TIME: 11:00 AM  FACILITATOR(S): Vesta Orta LADC; Clementine Linn; Sasha Holland LADC  TOPIC: BEH Group Therapy  Number of patients attending the group:  8  Group Length:  2 Hours    Group Therapy Type: Recovery strategies and Health and wellbeing     Summary of Group / Topics Discussed:    Recovery Principles, Sober coping skills, and Relapse prevention Tobacco Cessation      Group Attendance:  Attended group session    Patient's response to the group topic/interactions:  cooperative with task    Patient appeared to be Actively participating, Attentive and Engaged.        Client specific details: Patient attended and participated in tobacco cessation presentation. Patient seemed receptive to information and education.

## 2021-04-04 ENCOUNTER — HOSPITAL ENCOUNTER (OUTPATIENT)
Dept: BEHAVIORAL HEALTH | Facility: CLINIC | Age: 61
End: 2021-04-04
Attending: FAMILY MEDICINE
Payer: COMMERCIAL

## 2021-04-04 VITALS — OXYGEN SATURATION: 96 % | TEMPERATURE: 98.2 F

## 2021-04-04 PROCEDURE — H2035 A/D TX PROGRAM, PER HOUR: HCPCS | Mod: HQ

## 2021-04-04 PROCEDURE — 1002N00001 HC LODGING PLUS FACILITY CHARGE ADULT

## 2021-04-04 NOTE — GROUP NOTE
Group Therapy Documentation    PATIENT'S NAME: Saroj Summers  MRN:   [id number]  :   1960  ACCT. NUMBER: [id number]  DATE OF SERVICE: 21  START TIME: 12:30 PM  END TIME:  1:30 PM  FACILITATOR(S): Sasha Holland LADC; Nura Alvarado LADC  TOPIC: BEH Group Therapy  Number of patients attending the group:  8  Group Length:  1 Hours    Group Therapy Type: Recovery strategies    Summary of Group / Topics Discussed:    Relapse prevention      Group Attendance:  Attended group session    Patient's response to the group topic/interactions:  cooperative with task    Patient appeared to be Engaged.        Client specific details:  Patient was engaged in educational lecture on self-awareness and developing healthy behaviors.

## 2021-04-04 NOTE — GROUP NOTE
Group Therapy Documentation    PATIENT'S NAME: Saroj Summers  MRN:   [id number]  :   1960  [id number]  DATE OF SERVICE: 21  START TIME:  9:00 AM  END TIME: 11:00 AM  FACILITATOR(S): Sasha Holland LADC; Nura Alvarado LADC  TOPIC: BEH Group Therapy  Number of patients attending the group: 8  Group Length:  2 Hours    Group Therapy Type: Recovery strategies    Summary of Group / Topics Discussed:    Relapse prevention and Self-care activities      Group Attendance:  Attended group session    Patient's response to the group topic/interactions:  cooperative with task    Patient appeared to be Engaged.        Client specific details:  Patient participated in an educational activity on  developing and maintaining a healthy routine and self-care.

## 2021-04-05 ENCOUNTER — HOSPITAL ENCOUNTER (OUTPATIENT)
Dept: BEHAVIORAL HEALTH | Facility: CLINIC | Age: 61
End: 2021-04-05
Attending: FAMILY MEDICINE
Payer: COMMERCIAL

## 2021-04-05 VITALS — TEMPERATURE: 98 F | OXYGEN SATURATION: 98 %

## 2021-04-05 PROCEDURE — 1002N00001 HC LODGING PLUS FACILITY CHARGE ADULT

## 2021-04-05 PROCEDURE — H2035 A/D TX PROGRAM, PER HOUR: HCPCS | Mod: HQ

## 2021-04-05 NOTE — GROUP NOTE
Group Therapy Documentation    PATIENT'S NAME: Saroj Summers  MRN:   [id number]  :   1960  ACCT. NUMBER: [id number]  DATE OF SERVICE: 21  START TIME: 12:30 PM  END TIME:  2:30 PM  FACILITATOR(S): Elver Garrido LADC; Madeleine Rodriguez  TOPIC: BEH Group Therapy  Number of patients attending the group:  7  Group Length:  2 Hours    Group Therapy Type: Health and wellbeing     Summary of Group / Topics Discussed:    Spiritual Care      Group Attendance:  Attended group session    Patient's response to the group topic/interactions:  cooperative with task    Patient appeared to be Actively participating.        Client specific details:  Herb participated and interacted appropriately with peers and staff in PM group. No triggers to use noted or discussed.

## 2021-04-05 NOTE — GROUP NOTE
Group Therapy Documentation    PATIENT'S NAME: Saroj Summers  MRN:   [id number]  :   1960  ACCT. NUMBER: [id number]  DATE OF SERVICE: 21  START TIME:  9:00 AM  END TIME: 11:00 AM  FACILITATOR(S): Aide Sheppard LADC; Lionel Mcfarland LADC  TOPIC: BEH Group Therapy  Number of patients attending the group:  7  Group Length:  2 hours    Group Therapy Type: Recovery strategies    Summary of Group / Topics Discussed:    Recovery Principles      Group Attendance:  Attended group session    Patient's response to the group topic/interactions:  cooperative with task    Patient appeared to be Actively participating, Attentive and Engaged.        Client specific details:  Herb attended morning group therapy session.  He participated in discussions on how setting and accomplishing attainable goals builds self esteem, and who are our best friends and why.  Herb demonstrated support for another group member who was graduating from the program by providing positive feedback.

## 2021-04-05 NOTE — PROGRESS NOTES
Shelby at [facility] called and stated patient has an intake appt Tues 4/13 at 9 AM.

## 2021-04-06 ENCOUNTER — HOSPITAL ENCOUNTER (OUTPATIENT)
Dept: BEHAVIORAL HEALTH | Facility: CLINIC | Age: 61
End: 2021-04-06
Attending: FAMILY MEDICINE
Payer: COMMERCIAL

## 2021-04-06 PROCEDURE — H2035 A/D TX PROGRAM, PER HOUR: HCPCS | Mod: HQ

## 2021-04-06 PROCEDURE — 1002N00001 HC LODGING PLUS FACILITY CHARGE ADULT

## 2021-04-06 NOTE — GROUP NOTE
Group Therapy Documentation    PATIENT'S NAME: Saroj Summers  MRN:   [id number]  :   1960  ACCT. NUMBER: [id number]  DATE OF SERVICE: 21  START TIME:  9:00 AM  END TIME: 11:00 AM  FACILITATOR(S): Aide Sheppard LADC  TOPIC: BEH Group Therapy  Number of patients attending the group:  8  Group Length:  2 hours    Group Therapy Type: Recovery strategies    Summary of Group / Topics Discussed:    Recovery Principles      Group Attendance:  Attended group session    Patient's response to the group topic/interactions:  cooperative with task    Patient appeared to be Engaged.        Client specific details:  Herb attended morning group therapy session.  He participated in discussions on what are our barriers to recovery, and how we lie to ourselves and others, especially related to our addictions.  He demonstrated support for another group member who presented her drug use history assignment by asking questions and providing positive feedback.

## 2021-04-06 NOTE — GROUP NOTE
Group Therapy Documentation    PATIENT'S NAME: Saroj Summers  MRN:   [id number]  :   1960  ACCT. NUMBER: [id number]  DATE OF SERVICE: 21  START TIME: 12:30 PM  END TIME:  2:30 PM  FACILITATOR(S): Elver Garrido LADC  TOPIC: BEH Group Therapy  Number of patients attending the group:  8  Group Length:  2 Hours    Group Therapy Type: Recovery strategies    Summary of Group / Topics Discussed:    Sober coping skills      Group Attendance:  Attended group session    Patient's response to the group topic/interactions:  cooperative with task    Patient appeared to be Actively participating.        Client specific details:  Herb participated and interacted appropriately with peers and staff in PM group. No triggers to use noted or discussed.

## 2021-04-06 NOTE — GROUP NOTE
Group Therapy Documentation    PATIENT'S NAME: Saroj Summers  MRN:   [id number]  :   1960  ACCT. NUMBER: [id number]  DATE OF SERVICE: 21  START TIME:  3:00 PM  END TIME:  4:00 PM  FACILITATOR(S): Jayda Byrd LADC; Rony Moran; Saroj Kennedy [facility][note 2]; Denice Hearn LADC  TOPIC: BEH Group Therapy  Number of patients attending the group: 27  Group Length:  1 Hours    Group Therapy Type: Health and wellbeing     Summary of Group / Topics Discussed:    Recovery Principles and Self-care activities      Group Attendance:  Attended group session    Patient's response to the group topic/interactions:  cooperative with task    Patient appeared to be Attentive and Engaged.        Client specific details: Herb participated in Skills Group on nutrition, presented by Dr. Rony Moran.

## 2021-04-07 ENCOUNTER — HOSPITAL ENCOUNTER (OUTPATIENT)
Dept: BEHAVIORAL HEALTH | Facility: CLINIC | Age: 61
End: 2021-04-07
Attending: FAMILY MEDICINE
Payer: COMMERCIAL

## 2021-04-07 VITALS — TEMPERATURE: 98 F | OXYGEN SATURATION: 96 %

## 2021-04-07 PROCEDURE — 1002N00001 HC LODGING PLUS FACILITY CHARGE ADULT

## 2021-04-07 PROCEDURE — H2035 A/D TX PROGRAM, PER HOUR: HCPCS | Mod: HQ

## 2021-04-07 NOTE — GROUP NOTE
Group Therapy Documentation    PATIENT'S NAME: Saroj Summers  MRN:   [id number]  :   1960  ACCT. NUMBER: [id number]  DATE OF SERVICE: 21  START TIME: 12:30 PM  END TIME:  2:30 PM  FACILITATOR(S): Elver Garrido LADC  TOPIC: BEH Group Therapy  Number of patients attending the group:  8  Group Length:  2 Hours    Group Therapy Type: Health and wellbeing     Summary of Group / Topics Discussed:    Balanced lifestyle      Group Attendance:  Attended group session    Patient's response to the group topic/interactions:  cooperative with task    Patient appeared to be Actively participating.        Client specific details:  Herb participated and interacted appropriately with peers and staff in PM group. No triggers to use noted or discussed.

## 2021-04-07 NOTE — GROUP NOTE
Group Therapy Documentation    PATIENT'S NAME: Saroj Summers  MRN:   [id number]  :   1960  ACCT. NUMBER: [id number]  DATE OF SERVICE: 21  START TIME:  9:45 AM  END TIME: 11:15 AM  FACILITATOR(S): Lionel Mcfarland LADC  TOPIC: BEH Group Therapy  Number of patients attending the group:  7  Group Length:  1.5 Hours    Group Therapy Type: Recovery strategies    Summary of Group / Topics Discussed:    Recovery Principles      Group Attendance:  Attended group session    Patient's response to the group topic/interactions:  cooperative with task    Patient appeared to be Attentive.        Client specific details:  Herb attended AM group. They talked about what healthy coping skills they are using for stress. Patient also checked in with feelings, and what they are liking about being sober. Patient also helped graduate a peer.

## 2021-04-07 NOTE — GROUP NOTE
Psychoeducation Group Documentation    PATIENT'S NAME: Saroj Summers  MRN:   [id number]  :   1960  ACCT. NUMBER: [id number]  DATE OF SERVICE: 21  START TIME:  8:30 AM  END TIME:  9:30 AM  FACILITATOR(S): Elver Garrido LADC; Saroj Kennedy LADC; Clementine Linn  TOPIC: BEH Pyschoeducation  Number of patients attending the group:  28  Group Length:  1 Hours    Skills Group Therapy Type: Recovery skills and Daily living/independence skills    Summary of Group / Topics Discussed:    Relationship/social skills and Balanced lifestyle skills          Group Attendance:  Attended group session    Patient's response to the group topic/interactions:  cooperative with task    Patient appeared to be Actively participating, Attentive and Engaged.         Client specific details:  .

## 2021-04-08 ENCOUNTER — HOSPITAL ENCOUNTER (OUTPATIENT)
Dept: BEHAVIORAL HEALTH | Facility: CLINIC | Age: 61
End: 2021-04-08
Attending: FAMILY MEDICINE
Payer: COMMERCIAL

## 2021-04-08 VITALS — OXYGEN SATURATION: 96 % | TEMPERATURE: 97.6 F

## 2021-04-08 PROCEDURE — 1002N00001 HC LODGING PLUS FACILITY CHARGE ADULT

## 2021-04-08 PROCEDURE — H2035 A/D TX PROGRAM, PER HOUR: HCPCS | Mod: HQ

## 2021-04-08 NOTE — GROUP NOTE
Group Therapy Documentation    PATIENT'S NAME: Saroj Summers  MRN:   [id number]  :   1960  ACCT. NUMBER: [id number]  DATE OF SERVICE: 21  START TIME: 12:30 PM  END TIME:  2:30 PM  FACILITATOR(S): Elver Garrido LADC  TOPIC: BEH Group Therapy  Number of patients attending the group:  7  Group Length:  2 Hours    Group Therapy Type: Emotion processing    Summary of Group / Topics Discussed:    Emotions/expression      Group Attendance:  Attended group session    Patient's response to the group topic/interactions:  cooperative with task    Patient appeared to be Actively participating.        Client specific details:  Herb participated and interacted appropriately with peers and staff in PM group. No triggers to use noted or discussed.

## 2021-04-08 NOTE — PROGRESS NOTES
Shelby from  [facility] called and confirmed that patient has an intake appt Tues 4/13 at 9 AM. The address is [address] [place] [address]. Phone [phone]

## 2021-04-08 NOTE — GROUP NOTE
Psychoeducation Group Documentation    PATIENT'S NAME: Saroj Summers  MRN:   [id number]  :   1960  [id number]  DATE OF SERVICE: 21  START TIME:  3:00 PM  END TIME:  4:00 PM  FACILITATOR(S): Clementine Linn; Saroj Kennedy LADC; Denice Hearn ADC-T  TOPIC: BEH Pyschoeducation  Number of patients attending the group: 7  Group Length:  1 Hours    Skills Group Therapy Type: Healthy behaviors development    Summary of Group / Topics Discussed:    Balanced lifestyle skills, Symptom management skills, and Medication management skills    Group Attendance:  Attended group session    Patient's response to the group topic/interactions:  cooperative with task and listened actively    Patient appeared to be Attentive and Engaged.         Client specific details: Pt was attentive during Dr. Aranda's lecture on the disease of addiction.

## 2021-04-08 NOTE — PROGRESS NOTES
[address]  [facility] [place] [address]              Saroj Summers , 1960, was admitted for evaluation/treatment of chemical dependency at [facility]. This person took part in this program:     ______ The Inpatient Program   ______ The Outpatient Program   ___X___ The Lodging Plus Program   ______ Lodging Day Outpatient         Date admitted: 3/12/2021  Date discharged: 4/9/2021     Type of discharge:   ___X___ Satisfactory - completed evaluation / treatment   _______ Discharged without completing   ______ Behavioral discharge   ______ Transferred to another chemical dependency program   ______ Transferred to another type of service   ______ Left against medical advice (AMA) / Eloped               Counselor:  SHANNAN Aggarwal and SHANNAN Collazo      Date: 4/9/2021            Time: 7:01 AM

## 2021-04-08 NOTE — PROGRESS NOTES
[facility]D Discharge Summary/Instructions     Patient: Saroj Summers  MRN: [id number]   : 1960 Age: 60 year old Sex: male   -  Focus of Treatment / Discharge Recommendations    Personal Safety/ Management of Symptoms    * Follow your safety plan.  Report increased symptoms to your care team and /or go to the nearest Emergency Department.    * Call crisis lines as needed    [facility] [phone]                [facility] [phone]  [facility] [phone]              [facility] [phone]  [facility] [phone]              [facility] [phone]  [facility] [phone]          National Suicide Prevention [phone]  [facility] [phone]            Suicide Prevention [phone]  [facility] [phone]    Abstinence/Relapse Prevention  * Take all medicines as directed.  Carry a current list of medicines with you.  * Use coping skills: talking to sober and supportive friends, family, and peers. Use stress management techniques such as mindfulness, prayer/meditation, exercise, diaphragmatic breathing.   * Do not use illicit (street) drugs, controlled substances (narcotics) or alcohol.    Develop/Improve Independent Living/Socialization Skills: continue to build upon healthy living skills.     Community Resources/Supports and Discharge Planning:    Attend intake at [facility] on  at 9 AM. The address is [address] [place] [address]. Phone [phone]    Consider working with a therapist for one on one sessions. Agree to weekly/biweekly sessions.     Patient to schedule an appointment with their PCP Dr. Gardner through [facility] in [place].     See your medical doctor about: medication or as needed.       Client Signature:_______________________   Date / Time:___________  Staff Signature:________________________   Date / Time:___________

## 2021-04-08 NOTE — GROUP NOTE
"Group Therapy Documentation    PATIENT'S NAME: Saroj Summers  MRN:   [id number]  :   1960  ACCT. NUMBER: [id number]  DATE OF SERVICE: 21  START TIME:  9:00 AM  END TIME: 11:00 AM  FACILITATOR(S): Lionel Mcfarland LADC  TOPIC: BEH Group Therapy  Number of patients attending the group: 7  Group Length:  2 Hours    Group Therapy Type: Recovery strategies    Summary of Group / Topics Discussed:    Recovery Principles      Group Attendance:  Attended group session    Patient's response to the group topic/interactions:  cooperative with task    Patient appeared to be Attentive.        Client specific details:  Herb attended AM group. Patient took part in a discussion on mindset and trying to reframe things to focus more on the positive. Patient also checked in, talked about goal for today, and what they are doing to support their health. Lastly, they took part in a discussion from a handout titled \"Cognitve Distortions\".  "

## 2021-04-09 NOTE — ADDENDUM NOTE
Encounter addended by: Lionel Mcfarland [facility] on: 4/9/2021 8:04 AM   Actions taken: Clinical Note Signed
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
## 2021-05-12 ENCOUNTER — APPOINTMENT (OUTPATIENT)
Dept: GENERAL RADIOLOGY | Facility: CLINIC | Age: 61
End: 2021-05-12
Attending: PHYSICIAN ASSISTANT
Payer: COMMERCIAL

## 2021-05-12 ENCOUNTER — HOSPITAL ENCOUNTER (EMERGENCY)
Facility: CLINIC | Age: 61
Discharge: HOME | End: 2021-05-12
Attending: PHYSICIAN ASSISTANT | Admitting: PHYSICIAN ASSISTANT
Payer: COMMERCIAL

## 2021-05-12 VITALS
OXYGEN SATURATION: 96 % | WEIGHT: 205 LBS | TEMPERATURE: 98.4 F | HEART RATE: 90 BPM | HEIGHT: 74 IN | DIASTOLIC BLOOD PRESSURE: 98 MMHG | BODY MASS INDEX: 26.31 KG/M2 | RESPIRATION RATE: 16 BRPM | SYSTOLIC BLOOD PRESSURE: 142 MMHG

## 2021-05-12 DIAGNOSIS — M25.561 RIGHT KNEE PAIN: ICD-10-CM

## 2021-05-12 DIAGNOSIS — S82.044A CLOSED NONDISPLACED COMMINUTED FRACTURE OF RIGHT PATELLA, INITIAL ENCOUNTER: ICD-10-CM

## 2021-05-12 PROCEDURE — G0463 HOSPITAL OUTPT CLINIC VISIT: HCPCS | Mod: 25 | Performed by: PHYSICIAN ASSISTANT

## 2021-05-12 PROCEDURE — 27520 TREAT KNEECAP FRACTURE: CPT | Mod: RT | Performed by: PHYSICIAN ASSISTANT

## 2021-05-12 PROCEDURE — 73562 X-RAY EXAM OF KNEE 3: CPT | Mod: RT

## 2021-05-12 PROCEDURE — 99282 EMERGENCY DEPT VISIT SF MDM: CPT | Mod: 57 | Performed by: PHYSICIAN ASSISTANT

## 2021-05-12 PROCEDURE — 27520 TREAT KNEECAP FRACTURE: CPT | Mod: 54 | Performed by: PHYSICIAN ASSISTANT

## 2021-05-12 ASSESSMENT — ENCOUNTER SYMPTOMS
CARDIOVASCULAR NEGATIVE: 1
RESPIRATORY NEGATIVE: 1
JOINT SWELLING: 1
GASTROINTESTINAL NEGATIVE: 1

## 2021-05-12 ASSESSMENT — MIFFLIN-ST. JEOR: SCORE: 1804.62

## 2021-05-12 NOTE — ED TRIAGE NOTES
fall about 1.5 weeks ago; ongoing right knee pain, thought it would be better by now, but not improving.

## 2021-05-12 NOTE — ED PROVIDER NOTES
"  History     Chief Complaint   Patient presents with     Knee Pain     fall about 1.5 weeks ago; ongoing right knee pain, thought it would be better by now, but not improving.      HPI  Saroj Summers is a 60 year old male who is presenting with right knee pain secondary to a fall which occurred 1.5 weeks ago.  He said he tripped and fell on a sidewalk and lunged forward landing on his right knee and left hand.  Denies head trauma or arm, wrist, elbow injuries. However he did have a minor cut on his left hand, now healed.  The pain initially got better but he still is limping, needs to keep the knee locked while walking or feels like it will \"give out\".  He describes having significant displacement of his kneecap at the time of the fall. He recalls moving the kneecap back in place. He is concerned about continued pain in his right knee and would like further evaluation. He initially applied ice and took ibuprofen for the injury.  Has been taking ibuprofen 800 mg which has helped with the pain, has taken several doses over the past few days. He is currently in a substance abuse program and does not want to have narcotics for pain control.    Allergies:  Allergies   Allergen Reactions     Nka [No Known Allergies]        Problem List:    Patient Active Problem List    Diagnosis Date Noted     Chemical dependency (H) 03/12/2021     Priority: Medium     Alcohol dependence with withdrawal with complication (H) 03/09/2021     Priority: Medium     Toxic encephalopathy 03/05/2021     Priority: Medium     Alcohol withdrawal (H) 03/05/2021     Priority: Medium     Hypokalemia 03/04/2021     Priority: Medium     Alcohol withdrawal syndrome without complication (H) 03/04/2021     Priority: Medium     Gastrointestinal hemorrhage, unspecified gastrointestinal hemorrhage type 03/04/2021     Priority: Medium     Appendicitis with abscess 07/14/2020     Priority: Medium     Acute appendicitis with perforation and localized " peritonitis, without abscess or gangrene 2020     Priority: Medium     Added automatically from request for surgery [id number]       PTSD (post-traumatic stress disorder) 06/15/2015     Priority: Medium     Anxiety state 2012     Priority: Medium     Problem list name updated by automated process. Provider to review       Health Care Home 09/10/2012     Priority: Medium     Status: Pt is not currently in active care coordination.            Tobacco abuse 08/15/2012     Priority: Medium     Mild major depression (H) 08/15/2012     Priority: Medium     Alcoholism (H) 08/15/2012     Priority: Medium     [facility] -  relapse 12  [facility]  2012 Herb is living at a alf house, has been in CD treatment,         Diverticulosis of sigmoid colon 2009     Priority: Medium     Overview:   per colonoscopy       Depressive disorder 11/15/2007     Priority: Medium     Overview:   Had a lot going on. Sons brain surgeries, wife's bipolar and alcoholism......       Encounter for general adult medical examination without abnormal findings 11/15/2007     Priority: Medium     Recheck lipid and A1C @  Overview:   LAST CPE 1999       Family history of malignant neoplasm of gastrointestinal tract 11/15/2007     Priority: Medium     Overview:   Father had colon cancer at 51yr and  52yr          Past Medical History:    Past Medical History:   Diagnosis Date     Anxiety      Depression      Diverticulosis of sigmoid colon      PTSD (post-traumatic stress disorder)        Past Surgical History:    Past Surgical History:   Procedure Laterality Date     LAPAROSCOPIC APPENDECTOMY N/A 2020    Procedure: Laparoscopic Appendectomy;  Surgeon: Valeriano Vega DO;  Location: [facility] OR       Family History:    Family History   Problem Relation Age of Onset     Alcoholism Mother      Attention Deficit Disorder Mother      Cancer - colorectal Father      Alcoholism Father       "Cancer Paternal Grandmother      ZULEIKAAMeccaD. Paternal Grandfather      Alcoholism Brother      Attention Deficit Disorder Brother      Other - See Comments Son         Traumatic brain injury from combat     Post-Traumatic Stress Disorder (PTSD) Son      Alcoholism Son      Bipolar Disorder Sister      Neurologic Disorder Son         brain trauma at birth     Autism Spectrum Disorder Son        Social History:  Marital Status:   [4]  Social History     Tobacco Use     Smoking status: Former Smoker     Packs/day: 15.00     Years: 0.50     Pack years: 7.50     Quit date:      Years since quittin.3     Smokeless tobacco: Current User     Types: Snuff   Substance Use Topics     Alcohol use: Not Currently     Comment: sober 2012     Drug use: No        Medications:    gabapentin (NEURONTIN) 300 MG capsule  hydrOXYzine (ATARAX) 25 MG tablet  losartan (COZAAR) 50 MG tablet  methocarbamol (ROBAXIN) 500 MG tablet  multivitamin w/minerals (THERA-VIT-M) tablet  nicotine (NICODERM CQ) 21 MG/24HR 24 hr patch  nicotine polacrilex (NICORETTE) 4 MG gum  pantoprazole (PROTONIX) 40 MG EC tablet  thiamine (B-1) 100 MG tablet  traZODone (DESYREL) 50 MG tablet          Review of Systems   Respiratory: Negative.    Cardiovascular: Negative.    Gastrointestinal: Negative.    Musculoskeletal: Positive for gait problem and joint swelling.   Skin: Negative.        Physical Exam   BP: (!) 165/99  Pulse: 90  Temp: 98.4  F (36.9  C)  Resp: 16  Height: 188 cm (6' 2\")  Weight: 93 kg (205 lb)  SpO2: 96 %      Physical Exam  Constitutional:       General: He is not in acute distress.     Appearance: Normal appearance. He is not ill-appearing.   HENT:      Head: Normocephalic and atraumatic.   Cardiovascular:      Pulses: Normal pulses.   Musculoskeletal:         General: Swelling, tenderness and signs of injury present. No deformity.      Right shoulder: He exhibits decreased range of motion, tenderness, swelling, effusion, pain and " decreased strength. He exhibits no crepitus and normal pulse.      Right knee: He exhibits decreased range of motion, swelling, effusion and ecchymosis. He exhibits no deformity, no erythema and normal alignment. Tenderness found. Patellar tendon tenderness noted. No medial joint line, no lateral joint line, no MCL and no LCL tenderness noted.      Right lower leg: No edema.      Left lower leg: No edema.      Comments: Ecchymosis at the medial aspect of his distal right thigh.  Has some laxity of the patellar tendon.  Pain with knee flexion. No popping or grinding.  Has an effusion superior to the knee joint. Distal pulse and sensation intact. No tenderness at the proximal fibula.   Neurological:      General: No focal deficit present.      Mental Status: He is alert and oriented to person, place, and time.   Psychiatric:         Mood and Affect: Mood normal.         Behavior: Behavior normal.         Thought Content: Thought content normal.         Judgment: Judgment normal.         ED Course        Procedures               Results for orders placed or performed during the hospital encounter of 05/12/21 (from the past 24 hour(s))   XR Knee Right 3 Views    Narrative    XR KNEE RIGHT 3 VIEWS 5/12/2021 3:39 PM     HISTORY: knee pain      Impression    IMPRESSION: Mid to lower pole patellar transverse fracture, slightly  comminuted with up to 1.2 cm retraction at the fracture site. Joint  effusion. The distal femur and proximal tibia and fibula appear  intact.    MELINA ARRIAZA MD       Medications - No data to display    Assessments & Plan (with Medical Decision Making)     The patient is a 60-year-old male with right knee pain secondary to a fall which occurred 1.5 weeks ago.  Has some edema anterior to the knee joint.  X-ray was significant for mid to lower pole patella transverse fracture with comminution. The femur and proximal tibia and fibula appear intact. I reviewed the x-ray with the patient. Provided the  patient with a knee immobilizer and referral to orthopedics, recommended follow-up time 3 to 5 days. Also provided the patient with crutches but he declined to use them. Recommend that he avoid weightbearing on the affected extremity. Recommended consistent use of the knee immobilizer and elevating the affected extremity. Recommended over-the-counter medications for pain control including Tylenol and ibuprofen as needed. Recommended urgent medical evaluation if he experience worsening pain, tenderness or additional trauma to the affected area.       I have reviewed the nursing notes.    I have reviewed the findings, diagnosis, plan and need for follow up with the patient.      New Prescriptions    No medications on file       Final diagnoses:   Right knee pain   Closed nondisplaced comminuted fracture of right patella, initial encounter       5/12/2021   [facility] EMERGENCY DEPT     Maynor Chavez PA-C  05/12/21 [id number]

## 2021-05-12 NOTE — DISCHARGE INSTRUCTIONS
Commend taking extra strength Tylenol for pain.  Recommend use of over-the-counter ibuprofen as an extra means for pain control as needed.

## 2021-05-13 NOTE — PROGRESS NOTES
[facility] DISCHARGE NOTE    Patient discharged to transitional care unit at 4:32 PM via wheel chair. Accompanied by daughter and staff. Discharge instructions reviewed with patient and daughter, opportunity offered to ask questions. Prescriptions - None ordered for discharge. All belongings sent with patient.    Darrel Colindres RN

## 2021-05-28 ENCOUNTER — TRANSFERRED RECORDS (OUTPATIENT)
Dept: HEALTH INFORMATION MANAGEMENT | Facility: CLINIC | Age: 61
End: 2021-05-28

## 2021-07-16 ENCOUNTER — TRANSFERRED RECORDS (OUTPATIENT)
Dept: HEALTH INFORMATION MANAGEMENT | Facility: CLINIC | Age: 61
End: 2021-07-16

## 2021-08-17 ENCOUNTER — TRANSFERRED RECORDS (OUTPATIENT)
Dept: HEALTH INFORMATION MANAGEMENT | Facility: CLINIC | Age: 61
End: 2021-08-17

## 2021-09-17 ENCOUNTER — TRANSFERRED RECORDS (OUTPATIENT)
Dept: HEALTH INFORMATION MANAGEMENT | Facility: CLINIC | Age: 61
End: 2021-09-17

## 2021-10-07 ENCOUNTER — HOSPITAL ENCOUNTER (OUTPATIENT)
Dept: CT IMAGING | Facility: CLINIC | Age: 61
Discharge: HOME | End: 2021-10-07
Attending: PHYSICIAN ASSISTANT | Admitting: PHYSICIAN ASSISTANT
Payer: COMMERCIAL

## 2021-10-07 DIAGNOSIS — E27.8 RIGHT ADRENAL MASS (H): ICD-10-CM

## 2021-10-07 PROCEDURE — 74150 CT ABDOMEN W/O CONTRAST: CPT

## 2022-04-09 ENCOUNTER — HEALTH MAINTENANCE LETTER (OUTPATIENT)
Age: 62
End: 2022-04-09

## 2022-09-29 NOTE — PLAN OF CARE
9/29/2022     RE:  Tang Pereira   [address]  [facility] [place] [address]         To whom it may concern:    I am writing on behalf of my patient, Tang Pereira. Tang has sensitive skin and needs to use the [employer] brand pull ups.     If you have any questions, feel free to contact me.       Sincerely,        Hanna Hanson MD   [address] [facility] [address] [phone]         Behavioral Team Discussion: (3/10/2021)    Continued Stay Criteria/Rationale: Patient admitted for alcohol withdrawal, complicated.  Plan: The following services will be provided to the patient; psychiatric assessment, medication management, therapeutic milieu, individual and group support, and skills groups.   Participants: 3A Provider: Dr. Tracy Rolle MD; 3A RN's: Neal Mccormick, RN; 3A CM's: Tosin Rucker [facility], Amy Noland MA [facility] and Tiesha Rome [facility]   Summary/Recommendation: Providers will assess today for treatment recommendations, discharge planning, and aftercare plans. CM will meet with pt for discharge planning.   Medical/Physical: Internal medicine consult to be completed 3/10/2021.  Precautions:   Behavioral Orders   Procedures     Code 1 - Restrict to Unit     Routine Programming     As clinically indicated     Status 15     Every 15 minutes.     Withdrawal precautions     Rationale for change in precautions or plan: N/A  Progress: No Change.

## 2022-10-09 ENCOUNTER — HEALTH MAINTENANCE LETTER (OUTPATIENT)
Age: 62
End: 2022-10-09

## 2022-11-26 ENCOUNTER — HEALTH MAINTENANCE LETTER (OUTPATIENT)
Age: 62
End: 2022-11-26

## 2024-01-06 ENCOUNTER — HEALTH MAINTENANCE LETTER (OUTPATIENT)
Age: 64
End: 2024-01-06

## 2024-10-12 ENCOUNTER — HEALTH MAINTENANCE LETTER (OUTPATIENT)
Age: 64
End: 2024-10-12

## (undated) DEVICE — ENDO TROCAR FIRST ENTRY KII FIOS ADV FIX 05X100MM CFF03

## (undated) DEVICE — SU MONOCRYL 4-0 PS-2 18" UND Y496G

## (undated) DEVICE — SOL WATER IRRIG 1000ML BOTTLE 07139-09

## (undated) DEVICE — STOCKING SLEEVE COMPRESSION CALF MED

## (undated) DEVICE — DRAPE POUCH INSTRUMENT 3 POCKET 1018L

## (undated) DEVICE — ESU LIGASURE LAPAROSCOPIC BLUNT TIP SEALER 5MMX37CM LF1837

## (undated) DEVICE — ESU HOLSTER PLASTIC DISP E2400

## (undated) DEVICE — GLOVE PROTEXIS BLUE W/NEU-THERA 6.5  2D73EB65

## (undated) DEVICE — ENDO POUCH UNIV RETRIEVAL SYSTEM INZII 10MM CD001

## (undated) DEVICE — STPL RELOAD REG TISSUE ECHELON 45 X 3.6MM BLUE GST45B

## (undated) DEVICE — ESU PENCIL W/COATED BLADE E2450H

## (undated) DEVICE — SOL NACL 0.9% IRRIG 1000ML BOTTLE 07138-09

## (undated) DEVICE — ENDO TROCAR FIRST ENTRY KII FIOS ADV FIX 12X100MM CFF73

## (undated) DEVICE — ENDO DISSECTOR BLUNT 05MM  BTD05

## (undated) DEVICE — ADH SKIN CLOSURE PREMIERPRO EXOFIN 1.0ML 3470

## (undated) DEVICE — SU VICRYL 0 UR-6 27" J603H

## (undated) DEVICE — GLOVE PROTEXIS W/NEU-THERA 6.5  2D73TE65

## (undated) DEVICE — SOL NACL 0.9% IRRIG 3000ML BAG 2B7477

## (undated) DEVICE — BLADE CLIPPER 4406

## (undated) DEVICE — DECANTER VIAL 2006S

## (undated) DEVICE — GOWN XLG DISP 9545

## (undated) DEVICE — PREP CHLORAPREP 26ML TINTED ORANGE  260815

## (undated) DEVICE — GLOVE PROTEXIS W/NEU-THERA 7.5  2D73TE75

## (undated) DEVICE — Device

## (undated) DEVICE — GLOVE PROTEXIS BLUE W/NEU-THERA 8.0  2D73EB80

## (undated) DEVICE — SU PDS II 0 ENDOLOOP EZ10G

## (undated) DEVICE — STPL POWERED ECHELON 45MM PSEE45A

## (undated) DEVICE — SUCTION IRR STRYKERFLOW II W/TIP 250-070-520

## (undated) DEVICE — ENDO TROCAR SLEEVE KII ADV FIXATION 05X100MM CFS02

## (undated) RX ORDER — LIDOCAINE HYDROCHLORIDE 10 MG/ML
INJECTION, SOLUTION EPIDURAL; INFILTRATION; INTRACAUDAL; PERINEURAL
Status: DISPENSED
Start: 2020-07-13

## (undated) RX ORDER — DEXAMETHASONE SODIUM PHOSPHATE 4 MG/ML
INJECTION, SOLUTION INTRA-ARTICULAR; INTRALESIONAL; INTRAMUSCULAR; INTRAVENOUS; SOFT TISSUE
Status: DISPENSED
Start: 2020-07-13

## (undated) RX ORDER — GLYCOPYRROLATE 0.2 MG/ML
INJECTION, SOLUTION INTRAMUSCULAR; INTRAVENOUS
Status: DISPENSED
Start: 2020-07-13

## (undated) RX ORDER — PHENYLEPHRINE HCL IN 0.9% NACL 1 MG/10 ML
SYRINGE (ML) INTRAVENOUS
Status: DISPENSED
Start: 2020-07-13

## (undated) RX ORDER — FENTANYL CITRATE 50 UG/ML
INJECTION, SOLUTION INTRAMUSCULAR; INTRAVENOUS
Status: DISPENSED
Start: 2020-07-13

## (undated) RX ORDER — EPHEDRINE SULFATE 50 MG/ML
INJECTION, SOLUTION INTRAMUSCULAR; INTRAVENOUS; SUBCUTANEOUS
Status: DISPENSED
Start: 2020-07-13

## (undated) RX ORDER — ONDANSETRON 2 MG/ML
INJECTION INTRAMUSCULAR; INTRAVENOUS
Status: DISPENSED
Start: 2020-07-13

## (undated) RX ORDER — PROPOFOL 10 MG/ML
INJECTION, EMULSION INTRAVENOUS
Status: DISPENSED
Start: 2020-07-13